# Patient Record
Sex: MALE | Race: WHITE | NOT HISPANIC OR LATINO | URBAN - NONMETROPOLITAN AREA
[De-identification: names, ages, dates, MRNs, and addresses within clinical notes are randomized per-mention and may not be internally consistent; named-entity substitution may affect disease eponyms.]

---

## 2020-01-01 ENCOUNTER — HISTORICAL (OUTPATIENT)
Dept: ADMINISTRATIVE | Facility: HOSPITAL | Age: 40
End: 2020-01-01

## 2020-01-01 ENCOUNTER — HOSPITAL ENCOUNTER (INPATIENT)
Facility: HOSPITAL | Age: 40
LOS: 4 days | DRG: 917 | End: 2020-05-09
Attending: PSYCHIATRY & NEUROLOGY | Admitting: PSYCHIATRY & NEUROLOGY
Payer: MEDICAID

## 2020-01-01 VITALS
TEMPERATURE: 99 F | WEIGHT: 197 LBS | DIASTOLIC BLOOD PRESSURE: 97 MMHG | SYSTOLIC BLOOD PRESSURE: 170 MMHG | BODY MASS INDEX: 26.68 KG/M2 | OXYGEN SATURATION: 64 % | RESPIRATION RATE: 16 BRPM | HEIGHT: 72 IN | HEART RATE: 80 BPM

## 2020-01-01 DIAGNOSIS — N17.0 ATN (ACUTE TUBULAR NECROSIS): ICD-10-CM

## 2020-01-01 DIAGNOSIS — I46.9 CARDIAC ARREST: ICD-10-CM

## 2020-01-01 DIAGNOSIS — I95.9 HYPOTENSION, UNSPECIFIED HYPOTENSION TYPE: ICD-10-CM

## 2020-01-01 DIAGNOSIS — M62.82 NON-TRAUMATIC RHABDOMYOLYSIS: ICD-10-CM

## 2020-01-01 DIAGNOSIS — T50.901A ACCIDENTAL DRUG OVERDOSE, INITIAL ENCOUNTER: ICD-10-CM

## 2020-01-01 DIAGNOSIS — I49.9 ARRHYTHMIA: ICD-10-CM

## 2020-01-01 DIAGNOSIS — T50.901A OVERDOSE: ICD-10-CM

## 2020-01-01 DIAGNOSIS — E87.5 HYPERKALEMIA: ICD-10-CM

## 2020-01-01 DIAGNOSIS — J96.01 ACUTE RESPIRATORY FAILURE WITH HYPOXIA AND HYPERCAPNIA: ICD-10-CM

## 2020-01-01 DIAGNOSIS — E87.20 LACTIC ACIDOSIS: ICD-10-CM

## 2020-01-01 DIAGNOSIS — R57.9 SHOCK: ICD-10-CM

## 2020-01-01 DIAGNOSIS — R74.01 TRANSAMINITIS: ICD-10-CM

## 2020-01-01 DIAGNOSIS — J96.02 ACUTE RESPIRATORY FAILURE WITH HYPOXIA AND HYPERCAPNIA: ICD-10-CM

## 2020-01-01 DIAGNOSIS — N17.9 AKI (ACUTE KIDNEY INJURY): ICD-10-CM

## 2020-01-01 LAB
ABO + RH BLD: NORMAL
ABO + RH BLD: NORMAL
ALBUMIN SERPL BCP-MCNC: 2.1 G/DL (ref 3.5–5.2)
ALBUMIN SERPL BCP-MCNC: 2.2 G/DL (ref 3.5–5.2)
ALBUMIN SERPL BCP-MCNC: 2.3 G/DL (ref 3.5–5.2)
ALBUMIN SERPL BCP-MCNC: 2.4 G/DL (ref 3.5–5.2)
ALBUMIN SERPL BCP-MCNC: 2.5 G/DL (ref 3.5–5.2)
ALBUMIN SERPL BCP-MCNC: 2.6 G/DL (ref 3.5–5.2)
ALBUMIN SERPL BCP-MCNC: 2.7 G/DL (ref 3.5–5.2)
ALBUMIN SERPL BCP-MCNC: 2.8 G/DL (ref 3.5–5.2)
ALBUMIN SERPL BCP-MCNC: 3 G/DL (ref 3.5–5)
ALBUMIN/GLOB SERPL ELPH: 0.9 {RATIO} (ref 1.5–2.2)
ALCOHOL (ETHANOL), BLOOD: <3 MG/DL (ref 0–3)
ALLENS TEST: ABNORMAL
ALP SERPL-CCNC: 101 U/L (ref 55–135)
ALP SERPL-CCNC: 103 U/L (ref 55–135)
ALP SERPL-CCNC: 114 U/L (ref 55–135)
ALP SERPL-CCNC: 118 U/L (ref 55–135)
ALP SERPL-CCNC: 119 U/L (ref 50–136)
ALP SERPL-CCNC: 121 U/L (ref 55–135)
ALP SERPL-CCNC: 123 U/L (ref 55–135)
ALP SERPL-CCNC: 125 U/L (ref 55–135)
ALP SERPL-CCNC: 127 U/L (ref 55–135)
ALP SERPL-CCNC: 127 U/L (ref 55–135)
ALP SERPL-CCNC: 128 U/L (ref 55–135)
ALP SERPL-CCNC: 137 U/L (ref 55–135)
ALP SERPL-CCNC: 138 U/L (ref 55–135)
ALP SERPL-CCNC: 142 U/L (ref 55–135)
ALP SERPL-CCNC: 143 U/L (ref 55–135)
ALP SERPL-CCNC: 159 U/L (ref 55–135)
ALP SERPL-CCNC: 164 U/L (ref 55–135)
ALP SERPL-CCNC: 169 U/L (ref 55–135)
ALP SERPL-CCNC: 184 U/L (ref 55–135)
ALP SERPL-CCNC: 193 U/L (ref 55–135)
ALP SERPL-CCNC: 206 U/L (ref 55–135)
ALP SERPL-CCNC: 209 U/L (ref 55–135)
ALP SERPL-CCNC: 230 U/L (ref 55–135)
ALP SERPL-CCNC: 94 U/L (ref 55–135)
ALT SERPL W P-5'-P-CCNC: 359 U/L (ref 16–61)
ALT SERPL W/O P-5'-P-CCNC: 2605 U/L (ref 10–44)
ALT SERPL W/O P-5'-P-CCNC: 3510 U/L (ref 10–44)
ALT SERPL W/O P-5'-P-CCNC: 4129 U/L (ref 10–44)
ALT SERPL W/O P-5'-P-CCNC: 4523 U/L (ref 10–44)
ALT SERPL W/O P-5'-P-CCNC: 5047 U/L (ref 10–44)
ALT SERPL W/O P-5'-P-CCNC: 5124 U/L (ref 10–44)
ALT SERPL W/O P-5'-P-CCNC: 5330 U/L (ref 10–44)
ALT SERPL W/O P-5'-P-CCNC: 5557 U/L (ref 10–44)
ALT SERPL W/O P-5'-P-CCNC: 5814 U/L (ref 10–44)
ALT SERPL W/O P-5'-P-CCNC: 5916 U/L (ref 10–44)
ALT SERPL W/O P-5'-P-CCNC: 6249 U/L (ref 10–44)
ALT SERPL W/O P-5'-P-CCNC: 7406 U/L (ref 10–44)
ALT SERPL W/O P-5'-P-CCNC: 7672 U/L (ref 10–44)
ALT SERPL W/O P-5'-P-CCNC: 8056 U/L (ref 10–44)
ALT SERPL W/O P-5'-P-CCNC: 8371 U/L (ref 10–44)
ALT SERPL W/O P-5'-P-CCNC: 8559 U/L (ref 10–44)
ALT SERPL W/O P-5'-P-CCNC: 8592 U/L (ref 10–44)
ALT SERPL W/O P-5'-P-CCNC: 9063 U/L (ref 10–44)
ALT SERPL W/O P-5'-P-CCNC: 9269 U/L (ref 10–44)
ALT SERPL W/O P-5'-P-CCNC: 9362 U/L (ref 10–44)
ALT SERPL W/O P-5'-P-CCNC: 9383 U/L (ref 10–44)
ALT SERPL W/O P-5'-P-CCNC: 9773 U/L (ref 10–44)
ALT SERPL W/O P-5'-P-CCNC: ABNORMAL U/L (ref 10–44)
AMMONIA PLAS-SCNC: 122 UMOL/L (ref 10–50)
AMMONIA PLAS-SCNC: 204 UMOL/L (ref 10–50)
ANION GAP SERPL CALC-SCNC: 11 MMOL/L (ref 8–16)
ANION GAP SERPL CALC-SCNC: 12 MMOL/L (ref 8–16)
ANION GAP SERPL CALC-SCNC: 13 MMOL/L (ref 8–16)
ANION GAP SERPL CALC-SCNC: 14 MMOL/L (ref 8–16)
ANION GAP SERPL CALC-SCNC: 15 MMOL/L (ref 8–16)
ANION GAP SERPL CALC-SCNC: 16 MMOL/L (ref 8–16)
ANION GAP SERPL CALC-SCNC: 17 MMOL/L (ref 8–16)
ANION GAP SERPL CALC-SCNC: 18 MMOL/L (ref 8–16)
ANION GAP SERPL CALC-SCNC: 20.7 MEQ/L (ref 10–20)
ANION GAP SERPL CALC-SCNC: 24 MEQ/L (ref 10–20)
ANION GAP SERPL CALC-SCNC: 24.3 MEQ/L (ref 10–20)
ANION GAP SERPL CALC-SCNC: 25.4 MEQ/L (ref 10–20)
ANISOCYTOSIS BLD QL SMEAR: SLIGHT
APAP SERPL-MCNC: <2 UG/ML (ref 10–30)
APPEARANCE, UA: NORMAL
APTT BLDCRRT: 35.3 SEC (ref 21–32)
APTT BLDCRRT: 35.5 SEC (ref 21–32)
APTT BLDCRRT: 36.4 SEC (ref 21–32)
APTT BLDCRRT: 36.4 SEC (ref 21–32)
APTT BLDCRRT: 38.4 SEC (ref 21–32)
APTT BLDCRRT: 42.6 SEC (ref 21–32)
ASCENDING AORTA: 2.69 CM
AST SERPL-CCNC: 3280 U/L (ref 10–40)
AST SERPL-CCNC: 3580 U/L (ref 10–40)
AST SERPL-CCNC: 425 U/L (ref 15–37)
AST SERPL-CCNC: 4255 U/L (ref 10–40)
AST SERPL-CCNC: 4258 U/L (ref 10–40)
AST SERPL-CCNC: 4377 U/L (ref 10–40)
AST SERPL-CCNC: 5499 U/L (ref 10–40)
AST SERPL-CCNC: 5958 U/L (ref 10–40)
AST SERPL-CCNC: 6624 U/L (ref 10–40)
AST SERPL-CCNC: 6628 U/L (ref 10–40)
AST SERPL-CCNC: 8429 U/L (ref 10–40)
AST SERPL-CCNC: 8897 U/L (ref 10–40)
AST SERPL-CCNC: 9294 U/L (ref 10–40)
AST SERPL-CCNC: 9385 U/L (ref 10–40)
AST SERPL-CCNC: ABNORMAL U/L (ref 10–40)
AV INDEX (PROSTH): 0.8
AV MEAN GRADIENT: 1 MMHG
AV PEAK GRADIENT: 1 MMHG
AV VALVE AREA: 3.03 CM2
AV VELOCITY RATIO: 0.91
BACTERIA BLD CULT: NORMAL
BACTERIA BLD CULT: NORMAL
BACTERIA SPEC AEROBE CULT: NORMAL
BACTERIA SPEC AEROBE CULT: NORMAL
BACTERIA SPEC CULT: NORMAL /HPF
BASOPHILS # BLD AUTO: 0.01 K/UL (ref 0–0.2)
BASOPHILS # BLD AUTO: 0.05 K/UL (ref 0–0.2)
BASOPHILS # BLD AUTO: 0.09 K/UL (ref 0–0.2)
BASOPHILS # BLD AUTO: 0.1 K/UL (ref 0–0.2)
BASOPHILS # BLD AUTO: ABNORMAL K/UL (ref 0–0.2)
BASOPHILS # BLD AUTO: ABNORMAL K/UL (ref 0–0.2)
BASOPHILS NFR BLD: 0 % (ref 0–1.9)
BASOPHILS NFR BLD: 0.1 10 (ref 0–0.1)
BASOPHILS NFR BLD: 0.3 % (ref 0–1.9)
BASOPHILS NFR BLD: 0.4 % (ref 0–1.9)
BASOPHILS NFR BLD: 0.4 % (ref 0–1.9)
BASOPHILS NFR BLD: 0.5 % (ref 0–1.5)
BASOPHILS NFR BLD: 0.5 % (ref 0–1.9)
BE (B): -14.3 MMOL/L
BE (B): -15.5 MMOL/L
BE (B): ABNORMAL MMOL/L
BILIRUB SERPL-MCNC: 0.62 MG/DL (ref 0.2–1)
BILIRUB SERPL-MCNC: 1.1 MG/DL (ref 0.1–1)
BILIRUB SERPL-MCNC: 1.5 MG/DL (ref 0.1–1)
BILIRUB SERPL-MCNC: 1.9 MG/DL (ref 0.1–1)
BILIRUB SERPL-MCNC: 2.7 MG/DL (ref 0.1–1)
BILIRUB SERPL-MCNC: 3.1 MG/DL (ref 0.1–1)
BILIRUB SERPL-MCNC: 3.5 MG/DL (ref 0.1–1)
BILIRUB SERPL-MCNC: 4 MG/DL (ref 0.1–1)
BILIRUB SERPL-MCNC: 4 MG/DL (ref 0.1–1)
BILIRUB SERPL-MCNC: 4.2 MG/DL (ref 0.1–1)
BILIRUB SERPL-MCNC: 4.4 MG/DL (ref 0.1–1)
BILIRUB SERPL-MCNC: 4.5 MG/DL (ref 0.1–1)
BILIRUB SERPL-MCNC: 5 MG/DL (ref 0.1–1)
BILIRUB SERPL-MCNC: 5.1 MG/DL (ref 0.1–1)
BILIRUB SERPL-MCNC: 5.2 MG/DL (ref 0.1–1)
BILIRUB SERPL-MCNC: 5.2 MG/DL (ref 0.1–1)
BILIRUB SERPL-MCNC: 5.4 MG/DL (ref 0.1–1)
BILIRUB SERPL-MCNC: 5.9 MG/DL (ref 0.1–1)
BILIRUB SERPL-MCNC: 5.9 MG/DL (ref 0.1–1)
BILIRUB SERPL-MCNC: 6 MG/DL (ref 0.1–1)
BILIRUB SERPL-MCNC: 6.3 MG/DL (ref 0.1–1)
BILIRUB SERPL-MCNC: 6.4 MG/DL (ref 0.1–1)
BILIRUB SERPL-MCNC: 6.4 MG/DL (ref 0.1–1)
BILIRUB SERPL-MCNC: 6.5 MG/DL (ref 0.1–1)
BILIRUB UR QL STRIP: NEGATIVE MG/DL
BLD GP AB SCN CELLS X3 SERPL QL: NORMAL
BLD GP AB SCN CELLS X3 SERPL QL: NORMAL
BLD PROD TYP BPU: NORMAL
BLOOD UNIT EXPIRATION DATE: NORMAL
BLOOD UNIT TYPE CODE: 5100
BLOOD UNIT TYPE CODE: 5100
BLOOD UNIT TYPE CODE: 9500
BLOOD UNIT TYPE: NORMAL
BSA FOR ECHO PROCEDURE: 2.13 M2
BUDDING YEAST: PRESENT /HPF
BUN SERPL-MCNC: 11 MG/DL (ref 6–20)
BUN SERPL-MCNC: 13 MG/DL (ref 6–20)
BUN SERPL-MCNC: 15 MG/DL (ref 6–20)
BUN SERPL-MCNC: 20 MG/DL (ref 6–20)
BUN SERPL-MCNC: 20 MG/DL (ref 7–18)
BUN SERPL-MCNC: 21 MG/DL (ref 6–20)
BUN SERPL-MCNC: 21 MG/DL (ref 6–20)
BUN SERPL-MCNC: 21 MG/DL (ref 7–18)
BUN SERPL-MCNC: 21 MG/DL (ref 7–18)
BUN SERPL-MCNC: 22 MG/DL (ref 7–18)
BUN SERPL-MCNC: 23 MG/DL (ref 6–20)
BUN SERPL-MCNC: 24 MG/DL (ref 6–20)
BUN SERPL-MCNC: 4 MG/DL (ref 6–20)
BUN SERPL-MCNC: 5 MG/DL (ref 6–20)
BUN SERPL-MCNC: 6 MG/DL (ref 6–20)
BUN SERPL-MCNC: 7 MG/DL (ref 6–20)
BUN SERPL-MCNC: 7 MG/DL (ref 6–20)
BUN SERPL-MCNC: 8 MG/DL (ref 6–20)
BUN SERPL-MCNC: 9 MG/DL (ref 6–20)
BURR CELLS BLD QL SMEAR: ABNORMAL
BURR CELLS BLD QL SMEAR: ABNORMAL
CA-I BLDV-SCNC: 0.91 MMOL/L (ref 1.06–1.42)
CA-I BLDV-SCNC: 0.94 MMOL/L (ref 1.06–1.42)
CA-I BLDV-SCNC: 1.03 MMOL/L (ref 1.06–1.42)
CA-I BLDV-SCNC: 1.06 MMOL/L (ref 1.06–1.42)
CALCIUM SERPL-MCNC: 5.4 MG/DL (ref 8.7–10.5)
CALCIUM SERPL-MCNC: 5.9 MG/DL (ref 8.5–10.1)
CALCIUM SERPL-MCNC: 6 MG/DL (ref 8.7–10.5)
CALCIUM SERPL-MCNC: 6.2 MG/DL (ref 8.7–10.5)
CALCIUM SERPL-MCNC: 6.4 MG/DL (ref 8.5–10.1)
CALCIUM SERPL-MCNC: 6.7 MG/DL (ref 8.7–10.5)
CALCIUM SERPL-MCNC: 6.7 MG/DL (ref 8.7–10.5)
CALCIUM SERPL-MCNC: 6.8 MG/DL (ref 8.7–10.5)
CALCIUM SERPL-MCNC: 6.8 MG/DL (ref 8.7–10.5)
CALCIUM SERPL-MCNC: 6.9 MG/DL (ref 8.7–10.5)
CALCIUM SERPL-MCNC: 7 MG/DL (ref 8.7–10.5)
CALCIUM SERPL-MCNC: 7.1 MG/DL (ref 8.7–10.5)
CALCIUM SERPL-MCNC: 7.3 MG/DL (ref 8.7–10.5)
CALCIUM SERPL-MCNC: 7.5 MG/DL (ref 8.7–10.5)
CALCIUM SERPL-MCNC: 7.5 MG/DL (ref 8.7–10.5)
CALCIUM SERPL-MCNC: 7.6 MG/DL (ref 8.5–10.1)
CALCIUM SERPL-MCNC: 7.6 MG/DL (ref 8.7–10.5)
CALCIUM SERPL-MCNC: 7.7 MG/DL (ref 8.7–10.5)
CALCIUM SERPL-MCNC: 7.8 MG/DL (ref 8.7–10.5)
CALCIUM SERPL-MCNC: 8 MG/DL (ref 8.7–10.5)
CALCIUM SERPL-MCNC: 8.2 MG/DL (ref 8.7–10.5)
CALCIUM SERPL-MCNC: 8.3 MG/DL (ref 8.7–10.5)
CALCIUM SERPL-MCNC: 8.4 MG/DL (ref 8.7–10.5)
CALCIUM SERPL-MCNC: 8.5 MG/DL (ref 8.7–10.5)
CALCIUM SERPL-MCNC: 8.6 MG/DL (ref 8.7–10.5)
CALCIUM SERPL-MCNC: 8.7 MG/DL (ref 8.5–10.1)
CASTS, URINE MICROSCOPIC: 11 /LPF
CHLORIDE SERPL-SCNC: 100 MMOL/L (ref 95–110)
CHLORIDE SERPL-SCNC: 101 MMOL/L (ref 95–110)
CHLORIDE SERPL-SCNC: 102 MMOL/L (ref 95–110)
CHLORIDE SERPL-SCNC: 103 MMOL/L (ref 98–107)
CHLORIDE SERPL-SCNC: 104 MMOL/L (ref 95–110)
CHLORIDE SERPL-SCNC: 104 MMOL/L (ref 95–110)
CHLORIDE SERPL-SCNC: 105 MMOL/L (ref 95–110)
CHLORIDE SERPL-SCNC: 106 MMOL/L (ref 95–110)
CHLORIDE SERPL-SCNC: 107 MMOL/L (ref 98–107)
CHLORIDE SERPL-SCNC: 108 MMOL/L (ref 95–110)
CHLORIDE SERPL-SCNC: 110 MMOL/L (ref 98–107)
CHLORIDE SERPL-SCNC: 110 MMOL/L (ref 98–107)
CHLORIDE SERPL-SCNC: 98 MMOL/L (ref 95–110)
CHLORIDE SERPL-SCNC: 99 MMOL/L (ref 95–110)
CK SERPL-CCNC: ABNORMAL U/L (ref 20–200)
CO2 SERPL-SCNC: 15 MMOL/L (ref 23–29)
CO2 SERPL-SCNC: 16 MMOL/L (ref 22–32)
CO2 SERPL-SCNC: 16 MMOL/L (ref 22–32)
CO2 SERPL-SCNC: 16 MMOL/L (ref 23–29)
CO2 SERPL-SCNC: 17 MMOL/L (ref 23–29)
CO2 SERPL-SCNC: 18 MMOL/L (ref 22–32)
CO2 SERPL-SCNC: 18 MMOL/L (ref 22–32)
CO2 SERPL-SCNC: 18 MMOL/L (ref 23–29)
CO2 SERPL-SCNC: 19 MMOL/L (ref 23–29)
CO2 SERPL-SCNC: 20 MMOL/L (ref 23–29)
CO2 SERPL-SCNC: 21 MMOL/L (ref 23–29)
CO2 SERPL-SCNC: 22 MMOL/L (ref 23–29)
CO2 SERPL-SCNC: 23 MMOL/L (ref 23–29)
CO2 SERPL-SCNC: 23 MMOL/L (ref 23–29)
CO2 SERPL-SCNC: 24 MMOL/L (ref 23–29)
CO2 SERPL-SCNC: 27 MMOL/L (ref 23–29)
CO2 SERPL-SCNC: 29 MMOL/L (ref 23–29)
CO2 SERPL-SCNC: 30 MMOL/L (ref 23–29)
CO2 SERPL-SCNC: 31 MMOL/L (ref 23–29)
CODING SYSTEM: NORMAL
COLOR UR: YELLOW
CREAT SERPL-MCNC: 0.8 MG/DL (ref 0.5–1.4)
CREAT SERPL-MCNC: 0.9 MG/DL (ref 0.5–1.4)
CREAT SERPL-MCNC: 1 MG/DL (ref 0.5–1.4)
CREAT SERPL-MCNC: 1 MG/DL (ref 0.5–1.4)
CREAT SERPL-MCNC: 1.2 MG/DL (ref 0.5–1.4)
CREAT SERPL-MCNC: 1.2 MG/DL (ref 0.5–1.4)
CREAT SERPL-MCNC: 1.4 MG/DL (ref 0.5–1.4)
CREAT SERPL-MCNC: 1.5 MG/DL (ref 0.5–1.4)
CREAT SERPL-MCNC: 1.7 MG/DL (ref 0.5–1.4)
CREAT SERPL-MCNC: 1.72 MG/DL (ref 0.7–1.3)
CREAT SERPL-MCNC: 1.81 MG/DL (ref 0.7–1.3)
CREAT SERPL-MCNC: 1.82 MG/DL (ref 0.7–1.3)
CREAT SERPL-MCNC: 1.9 MG/DL (ref 0.5–1.4)
CREAT SERPL-MCNC: 1.99 MG/DL (ref 0.7–1.3)
CREAT SERPL-MCNC: 2 MG/DL (ref 0.5–1.4)
CREAT SERPL-MCNC: 2.1 MG/DL (ref 0.5–1.4)
CREAT SERPL-MCNC: 2.1 MG/DL (ref 0.5–1.4)
CREAT SERPL-MCNC: 2.2 MG/DL (ref 0.5–1.4)
CTCO2: 17.6 MMOL/L (ref 22–30)
CTCO2: 18.7 MMOL/L (ref 22–30)
CTCO2: ABNORMAL MMOL/L (ref 22–30)
CV ECHO LV RWT: 0.35 CM
DACRYOCYTES BLD QL SMEAR: ABNORMAL
DELSYS: ABNORMAL
DIFFERENTIAL METHOD: ABNORMAL
DISPENSE STATUS: NORMAL
DOHLE BOD BLD QL SMEAR: PRESENT
DOP CALC AO PEAK VEL: 0.53 M/S
DOP CALC AO VTI: 8.84 CM
DOP CALC LVOT AREA: 3.8 CM2
DOP CALC LVOT DIAMETER: 2.2 CM
DOP CALC LVOT PEAK VEL: 0.48 M/S
DOP CALC LVOT STROKE VOLUME: 26.75 CM3
DOP CALCLVOT PEAK VEL VTI: 7.04 CM
E WAVE DECELERATION TIME: 228.27 MSEC
E/A RATIO: 1.52
E/E' RATIO: 6.71 M/S
ECHO LV POSTERIOR WALL: 0.74 CM (ref 0.6–1.1)
EGFR: 40 ML/MIN/1.73M
EGFR: 44 ML/MIN/1.73M
EGFR: 44 ML/MIN/1.73M
EGFR: 47 ML/MIN/1.73M
EOSINOPHIL # BLD AUTO: 0 K/UL (ref 0–0.5)
EOSINOPHIL # BLD AUTO: ABNORMAL K/UL (ref 0–0.5)
EOSINOPHIL # BLD AUTO: ABNORMAL K/UL (ref 0–0.5)
EOSINOPHIL NFR BLD: 0 % (ref 0–8)
EOSINOPHIL NFR BLD: 0.1 % (ref 0–8)
EOSINOPHIL NFR BLD: 0.1 10 (ref 0–0.7)
EOSINOPHIL NFR BLD: 0.2 % (ref 0–8)
EOSINOPHIL NFR BLD: 0.5 % (ref 0–7)
EPITHELIAL, URINE MICROSCOPIC: 16 /HPF
ERYTHROCYTE [DISTWIDTH] IN BLOOD BY AUTOMATED COUNT: 13.2 % (ref 11.5–14.5)
ERYTHROCYTE [DISTWIDTH] IN BLOOD BY AUTOMATED COUNT: 13.6 % (ref 11.5–14.5)
ERYTHROCYTE [DISTWIDTH] IN BLOOD BY AUTOMATED COUNT: 13.6 % (ref 11.5–14.5)
ERYTHROCYTE [DISTWIDTH] IN BLOOD BY AUTOMATED COUNT: 14.1 % (ref 11.5–14.5)
ERYTHROCYTE [DISTWIDTH] IN BLOOD BY AUTOMATED COUNT: 14.4 % (ref 11.5–14.5)
ERYTHROCYTE [DISTWIDTH] IN BLOOD BY AUTOMATED COUNT: 14.5 % (ref 11.5–14.5)
ERYTHROCYTE [SEDIMENTATION RATE] IN BLOOD BY WESTERGREN METHOD: 18 MM/H
ERYTHROCYTE [SEDIMENTATION RATE] IN BLOOD BY WESTERGREN METHOD: 28 MM/H
ERYTHROCYTE [SEDIMENTATION RATE] IN BLOOD BY WESTERGREN METHOD: 32 MM/H
EST. GFR  (AFRICAN AMERICAN): 41.8 ML/MIN/1.73 M^2
EST. GFR  (AFRICAN AMERICAN): 44.2 ML/MIN/1.73 M^2
EST. GFR  (AFRICAN AMERICAN): 44.2 ML/MIN/1.73 M^2
EST. GFR  (AFRICAN AMERICAN): 46.9 ML/MIN/1.73 M^2
EST. GFR  (AFRICAN AMERICAN): 49.9 ML/MIN/1.73 M^2
EST. GFR  (AFRICAN AMERICAN): 57 ML/MIN/1.73 M^2
EST. GFR  (AFRICAN AMERICAN): >60 ML/MIN/1.73 M^2
EST. GFR  (NON AFRICAN AMERICAN): 36.1 ML/MIN/1.73 M^2
EST. GFR  (NON AFRICAN AMERICAN): 38.2 ML/MIN/1.73 M^2
EST. GFR  (NON AFRICAN AMERICAN): 38.2 ML/MIN/1.73 M^2
EST. GFR  (NON AFRICAN AMERICAN): 40.5 ML/MIN/1.73 M^2
EST. GFR  (NON AFRICAN AMERICAN): 43.1 ML/MIN/1.73 M^2
EST. GFR  (NON AFRICAN AMERICAN): 49.3 ML/MIN/1.73 M^2
EST. GFR  (NON AFRICAN AMERICAN): 57.4 ML/MIN/1.73 M^2
EST. GFR  (NON AFRICAN AMERICAN): >60 ML/MIN/1.73 M^2
ESTIMATED AVG GLUCOSE: 114 MG/DL (ref 68–131)
ETHANOL SERPL-MCNC: <10 MG/DL
FIBRINOGEN PPP-MCNC: 197 MG/DL (ref 182–366)
FIBRINOGEN PPP-MCNC: 78 MG/DL (ref 182–366)
FIO2: 100
FIO2: 50
FIO2: 60
FIO2: 90
FRACTIONAL SHORTENING: 21 % (ref 28–44)
GIANT PLATELETS BLD QL SMEAR: PRESENT
GIANT PLATELETS BLD QL SMEAR: PRESENT
GLOBULIN: 3.2 G/DL (ref 2.3–3.5)
GLUCOSE (UA): 100 MG/DL
GLUCOSE SERPL-MCNC: 100 MG/DL (ref 70–110)
GLUCOSE SERPL-MCNC: 100 MG/DL (ref 70–110)
GLUCOSE SERPL-MCNC: 107 MG/DL (ref 70–99)
GLUCOSE SERPL-MCNC: 112 MG/DL (ref 70–110)
GLUCOSE SERPL-MCNC: 134 MG/DL (ref 70–110)
GLUCOSE SERPL-MCNC: 155 MG/DL (ref 70–110)
GLUCOSE SERPL-MCNC: 34 MG/DL (ref 70–110)
GLUCOSE SERPL-MCNC: 60 MG/DL (ref 70–110)
GLUCOSE SERPL-MCNC: 61 MG/DL (ref 70–110)
GLUCOSE SERPL-MCNC: 64 MG/DL (ref 70–110)
GLUCOSE SERPL-MCNC: 65 MG/DL (ref 70–99)
GLUCOSE SERPL-MCNC: 66 MG/DL (ref 70–110)
GLUCOSE SERPL-MCNC: 68 MG/DL (ref 70–110)
GLUCOSE SERPL-MCNC: 69 MG/DL (ref 70–99)
GLUCOSE SERPL-MCNC: 73 MG/DL (ref 70–110)
GLUCOSE SERPL-MCNC: 74 MG/DL (ref 70–110)
GLUCOSE SERPL-MCNC: 74 MG/DL (ref 70–110)
GLUCOSE SERPL-MCNC: 77 MG/DL (ref 70–110)
GLUCOSE SERPL-MCNC: 77 MG/DL (ref 70–110)
GLUCOSE SERPL-MCNC: 81 MG/DL (ref 70–110)
GLUCOSE SERPL-MCNC: 82 MG/DL (ref 70–110)
GLUCOSE SERPL-MCNC: 83 MG/DL (ref 70–110)
GLUCOSE SERPL-MCNC: 84 MG/DL (ref 70–110)
GLUCOSE SERPL-MCNC: 86 MG/DL (ref 70–110)
GLUCOSE SERPL-MCNC: 90 MG/DL (ref 70–110)
GLUCOSE SERPL-MCNC: 92 MG/DL (ref 70–110)
GLUCOSE SERPL-MCNC: 93 MG/DL (ref 70–110)
GLUCOSE SERPL-MCNC: 94 MG/DL (ref 70–99)
GLUCOSE SERPL-MCNC: 95 MG/DL (ref 70–110)
GLUCOSE SERPL-MCNC: 96 MG/DL (ref 70–110)
GLUCOSE SERPL-MCNC: 99 MG/DL (ref 70–110)
GRAM STN SPEC: NORMAL
GRAN #: 4.39 10 (ref 2–7.5)
GRAN%: 1.9 %
GRAN%: 41.4 % (ref 50–80)
HBA1C MFR BLD HPLC: 5.6 % (ref 4–5.6)
HCO3 UR-SCNC: 17.4 MMOL/L (ref 24–28)
HCO3 UR-SCNC: 19.2 MMOL/L (ref 24–28)
HCO3 UR-SCNC: 19.3 MMOL/L (ref 24–28)
HCO3 UR-SCNC: 19.6 MMOL/L (ref 24–28)
HCO3 UR-SCNC: 20.3 MMOL/L (ref 24–28)
HCO3 UR-SCNC: 21.5 MMOL/L (ref 24–28)
HCO3 UR-SCNC: 23.6 MMOL/L (ref 24–28)
HCO3 UR-SCNC: 23.9 MMOL/L (ref 24–28)
HCO3 UR-SCNC: 24.6 MMOL/L (ref 24–28)
HCO3 UR-SCNC: 25.6 MMOL/L (ref 24–28)
HCO3 UR-SCNC: 26.5 MMOL/L (ref 24–28)
HCO3 UR-SCNC: 26.7 MMOL/L (ref 24–28)
HCO3 UR-SCNC: 30.5 MMOL/L (ref 24–28)
HCO3 UR-SCNC: 32.4 MMOL/L (ref 24–28)
HCO3 UR-SCNC: 37.5 MMOL/L (ref 24–28)
HCO3 UR-SCNC: 38.6 MMOL/L (ref 24–28)
HCO3-: 15.6 MMOL/L (ref 22–26)
HCO3-: 16.8 MMOL/L (ref 22–26)
HCO3-: ABNORMAL MMOL/L (ref 22–26)
HCT VFR BLD AUTO: 32.5 % (ref 40–54)
HCT VFR BLD AUTO: 34.7 % (ref 40–54)
HCT VFR BLD AUTO: 36.5 % (ref 40–54)
HCT VFR BLD AUTO: 39.7 % (ref 40–54)
HCT VFR BLD AUTO: 39.8 % (ref 40–54)
HCT VFR BLD AUTO: 40.8 % (ref 40–54)
HCT VFR BLD AUTO: 42.1 % (ref 43.5–53.7)
HCT VFR BLD AUTO: 46.7 % (ref 40–54)
HGB BLD-MCNC: 10.8 G/DL (ref 14–18)
HGB BLD-MCNC: 11.1 G/DL (ref 14–18)
HGB BLD-MCNC: 11.3 G/DL (ref 14–18)
HGB BLD-MCNC: 12.1 G/DL (ref 14.1–18.1)
HGB BLD-MCNC: 12.3 G/DL (ref 14–18)
HGB BLD-MCNC: 12.4 G/DL (ref 14–18)
HGB BLD-MCNC: 12.8 G/DL (ref 14–18)
HGB BLD-MCNC: 14 G/DL (ref 14–18)
HGB UR QL STRIP: NORMAL ERY/UL
HYPOCHROMIA BLD QL SMEAR: ABNORMAL
IMM GRANULOCYTES # BLD AUTO: 0.01 K/UL (ref 0–0.04)
IMM GRANULOCYTES # BLD AUTO: 0.07 K/UL (ref 0–0.04)
IMM GRANULOCYTES # BLD AUTO: 0.22 K/UL (ref 0–0.04)
IMM GRANULOCYTES # BLD AUTO: 1.06 K/UL (ref 0–0.04)
IMM GRANULOCYTES # BLD AUTO: ABNORMAL K/UL (ref 0–0.04)
IMM GRANULOCYTES NFR BLD AUTO: 0.3 % (ref 0–0.5)
IMM GRANULOCYTES NFR BLD AUTO: 0.7 % (ref 0–0.5)
IMM GRANULOCYTES NFR BLD AUTO: 0.9 % (ref 0–0.5)
IMM GRANULOCYTES NFR BLD AUTO: 4.2 % (ref 0–0.5)
IMM GRANULOCYTES NFR BLD AUTO: ABNORMAL % (ref 0–0.5)
IMMATURE GRANULOCYTES #: 0.2 10
INR PPP: 1.8 (ref 0.8–1.2)
INR PPP: 2.1 (ref 0.8–1.2)
INR PPP: 2.4 (ref 0.8–1.2)
INR PPP: 2.9 (ref 0.8–1.2)
INR PPP: 2.9 (ref 0.8–1.2)
INR PPP: 3 (ref 0.8–1.2)
INR PPP: 3.4 (ref 0.8–1.2)
INTERVENTRICULAR SEPTUM: 0.49 CM (ref 0.6–1.1)
IVRT: 98.95 MSEC
KETONES UR QL STRIP: NEGATIVE MG/DL
LA MAJOR: 5.08 CM
LA MINOR: 5.03 CM
LA WIDTH: 3.73 CM
LACTATE SERPL-SCNC: 10.1 MMOL/L (ref 0.5–2.2)
LACTATE SERPL-SCNC: 11.31 MMOL/L (ref 0.4–2)
LACTATE SERPL-SCNC: 16.92 MMOL/L (ref 0.4–2)
LACTATE SERPL-SCNC: 6.9 MMOL/L (ref 0.5–2.2)
LACTATE SERPL-SCNC: 7.2 MMOL/L (ref 0.5–2.2)
LEFT ATRIUM SIZE: 2.61 CM
LEFT ATRIUM VOLUME INDEX: 19.8 ML/M2
LEFT ATRIUM VOLUME: 41.83 CM3
LEFT INTERNAL DIMENSION IN SYSTOLE: 3.38 CM (ref 2.1–4)
LEFT VENTRICLE DIASTOLIC VOLUME INDEX: 38.72 ML/M2
LEFT VENTRICLE DIASTOLIC VOLUME: 81.97 ML
LEFT VENTRICLE MASS INDEX: 35 G/M2
LEFT VENTRICLE SYSTOLIC VOLUME INDEX: 22.1 ML/M2
LEFT VENTRICLE SYSTOLIC VOLUME: 46.85 ML
LEFT VENTRICULAR INTERNAL DIMENSION IN DIASTOLE: 4.28 CM (ref 3.5–6)
LEFT VENTRICULAR MASS: 74.55 G
LEUKOCYTE ESTERASE UR QL STRIP: NEGATIVE LEU/UL
LV LATERAL E/E' RATIO: 5.88 M/S
LV SEPTAL E/E' RATIO: 7.83 M/S
LYMPH #: 4.7 10 (ref 1–3.5)
LYMPH%: 44.3 % (ref 12–50)
LYMPHOCYTES # BLD AUTO: 1.2 K/UL (ref 1–4.8)
LYMPHOCYTES # BLD AUTO: 1.5 K/UL (ref 1–4.8)
LYMPHOCYTES # BLD AUTO: 1.8 K/UL (ref 1–4.8)
LYMPHOCYTES # BLD AUTO: 1.9 K/UL (ref 1–4.8)
LYMPHOCYTES # BLD AUTO: ABNORMAL K/UL (ref 1–4.8)
LYMPHOCYTES # BLD AUTO: ABNORMAL K/UL (ref 1–4.8)
LYMPHOCYTES NFR BLD: 12 % (ref 18–48)
LYMPHOCYTES NFR BLD: 12.6 % (ref 18–48)
LYMPHOCYTES NFR BLD: 45.3 % (ref 18–48)
LYMPHOCYTES NFR BLD: 5.8 % (ref 18–48)
LYMPHOCYTES NFR BLD: 7.4 % (ref 18–48)
LYMPHOCYTES NFR BLD: 8 % (ref 18–48)
LYMPHOCYTES NFR BLD: 9 % (ref 18–48)
MAGNESIUM SERPL-MCNC: 1.7 MG/DL (ref 1.6–2.6)
MAGNESIUM SERPL-MCNC: 1.8 MG/DL (ref 1.6–2.6)
MAGNESIUM SERPL-MCNC: 1.9 MG/DL (ref 1.6–2.6)
MAGNESIUM SERPL-MCNC: 2 MG/DL (ref 1.6–2.6)
MAGNESIUM SERPL-MCNC: 2.1 MG/DL (ref 1.6–2.6)
MAGNESIUM SERPL-MCNC: 2.3 MG/DL (ref 1.6–2.6)
MAGNESIUM SERPL-MCNC: 2.4 MG/DL (ref 1.6–2.6)
MCH RBC QN AUTO: 30.2 PG (ref 27–31)
MCH RBC QN AUTO: 30.4 PG (ref 27–31)
MCH RBC QN AUTO: 30.5 PG (ref 27–31)
MCH RBC QN AUTO: 30.6 PG (ref 27–31)
MCH RBC QN AUTO: 30.6 PG (ref 27–31)
MCH RBC QN AUTO: 30.9 PG (ref 27–31)
MCH RBC QN AUTO: 30.9 PG (ref 27–31)
MCH RBC QN AUTO: 31 PG (ref 27–31)
MCHC RBC AUTO-ENTMCNC: 28.7 G% (ref 32–35)
MCHC RBC AUTO-ENTMCNC: 30 G/DL (ref 32–36)
MCHC RBC AUTO-ENTMCNC: 31 G/DL (ref 32–36)
MCHC RBC AUTO-ENTMCNC: 31 G/DL (ref 32–36)
MCHC RBC AUTO-ENTMCNC: 31.2 G/DL (ref 32–36)
MCHC RBC AUTO-ENTMCNC: 31.4 G/DL (ref 32–36)
MCHC RBC AUTO-ENTMCNC: 32 G/DL (ref 32–36)
MCHC RBC AUTO-ENTMCNC: 33.2 G/DL (ref 32–36)
MCV RBC AUTO: 100 FL (ref 82–98)
MCV RBC AUTO: 101 FL (ref 82–98)
MCV RBC AUTO: 106.6 FL (ref 80–97)
MCV RBC AUTO: 93 FL (ref 82–98)
MCV RBC AUTO: 97 FL (ref 82–98)
MCV RBC AUTO: 97 FL (ref 82–98)
MCV RBC AUTO: 98 FL (ref 82–98)
MCV RBC AUTO: 99 FL (ref 82–98)
METAMYELOCYTES NFR BLD MANUAL: 1 %
METAMYELOCYTES NFR BLD MANUAL: 2 %
MIN VOL: 14.4
MIN VOL: 14.4
MIN VOL: 14.8
MIN VOL: 15.4
MIN VOL: 17
MIN VOL: 17.4
MODALLEN: ABNORMAL
MODE: ABNORMAL
MODE: AC
MONO #: 1.2 10 (ref 0–0.8)
MONO%: 11.4 % (ref 0–12)
MONOCYTES # BLD AUTO: 0 K/UL (ref 0.3–1)
MONOCYTES # BLD AUTO: 0.2 K/UL (ref 0.3–1)
MONOCYTES # BLD AUTO: 0.2 K/UL (ref 0.3–1)
MONOCYTES # BLD AUTO: 0.5 K/UL (ref 0.3–1)
MONOCYTES # BLD AUTO: ABNORMAL K/UL (ref 0.3–1)
MONOCYTES # BLD AUTO: ABNORMAL K/UL (ref 0.3–1)
MONOCYTES NFR BLD: 0.1 % (ref 4–15)
MONOCYTES NFR BLD: 1.8 % (ref 4–15)
MONOCYTES NFR BLD: 2 % (ref 4–15)
MONOCYTES NFR BLD: 2 % (ref 4–15)
MONOCYTES NFR BLD: 2.6 % (ref 4–15)
MONOCYTES NFR BLD: 3 % (ref 4–15)
MONOCYTES NFR BLD: 3.8 % (ref 4–15)
MV PEAK A VEL: 0.31 M/S
MV PEAK E VEL: 0.47 M/S
NEUTROPHILS # BLD AUTO: 2 K/UL (ref 1.8–7.7)
NEUTROPHILS # BLD AUTO: 21.8 K/UL (ref 1.8–7.7)
NEUTROPHILS # BLD AUTO: 23.7 K/UL (ref 1.8–7.7)
NEUTROPHILS # BLD AUTO: 7.8 K/UL (ref 1.8–7.7)
NEUTROPHILS NFR BLD: 50.3 % (ref 38–73)
NEUTROPHILS NFR BLD: 69 % (ref 38–73)
NEUTROPHILS NFR BLD: 72 % (ref 38–73)
NEUTROPHILS NFR BLD: 83.4 % (ref 38–73)
NEUTROPHILS NFR BLD: 86 % (ref 38–73)
NEUTROPHILS NFR BLD: 86.1 % (ref 38–73)
NEUTROPHILS NFR BLD: 92.8 % (ref 38–73)
NEUTS BAND NFR BLD MANUAL: 16 %
NEUTS BAND NFR BLD MANUAL: 16 %
NEUTS BAND NFR BLD MANUAL: 2 %
NITRITE UR QL STRIP: NEGATIVE MG/DL
NRBC BLD-RTO: 0 /100 WBC
NRBC BLD-RTO: 1 /100 WBC
NRBC BLD-RTO: 1 /100 WBC
NUM UNITS TRANS FFP: NORMAL
NUM UNITS TRANS FFP: NORMAL
O2SAT: 71.1 % (ref 92–100)
O2SAT: 89.4 % (ref 92–100)
O2SAT: 95.2 % (ref 92–100)
OB PNL STL: POSITIVE
OSMOC: 279 MOSM/KG (ref 275–295)
OSMOC: 286 MOSM/KG (ref 275–295)
OSMOC: 286 MOSM/KG (ref 275–295)
OSMOC: 287 MOSM/KG (ref 275–295)
OVALOCYTES BLD QL SMEAR: ABNORMAL
OVALOCYTES BLD QL SMEAR: ABNORMAL
PCO2 BLDA: 34.7 MMHG (ref 35–45)
PCO2 BLDA: 36.9 MMHG (ref 35–45)
PCO2 BLDA: 38.5 MMHG (ref 35–45)
PCO2 BLDA: 39.4 MMHG (ref 35–45)
PCO2 BLDA: 42.2 MMHG (ref 35–45)
PCO2 BLDA: 50.1 MMHG (ref 35–45)
PCO2 BLDA: 51.1 MMHG (ref 35–45)
PCO2 BLDA: 51.8 MMHG (ref 35–45)
PCO2 BLDA: 52.5 MMHG (ref 35–45)
PCO2 BLDA: 53 MMHG (ref 35–45)
PCO2 BLDA: 57.5 MMHG (ref 35–45)
PCO2 BLDA: 57.8 MMHG (ref 35–45)
PCO2 BLDA: 58.9 MMHG (ref 35–45)
PCO2 BLDA: 59.7 MMHG (ref 35–45)
PCO2 BLDA: 68.9 MMHG (ref 35–45)
PCO2 BLDA: 73.3 MMHG (ref 35–45)
PCO2: 122.8 MMHG (ref 35–45)
PCO2: 63.2 MMHG (ref 35–45)
PCO2: 64.9 MMHG (ref 35–45)
PEEP: 10
PEEP: 12
PEEP: 5
PEEP: 530
PH SMN: 7.06 [PH] (ref 7.35–7.45)
PH SMN: 7.09 [PH] (ref 7.35–7.45)
PH SMN: 7.17 [PH] (ref 7.35–7.45)
PH SMN: 7.17 [PH] (ref 7.35–7.45)
PH SMN: 7.19 [PH] (ref 7.35–7.45)
PH SMN: 7.2 [PH] (ref 7.35–7.45)
PH SMN: 7.24 [PH] (ref 7.35–7.45)
PH SMN: 7.25 [PH] (ref 7.35–7.45)
PH SMN: 7.28 [PH] (ref 7.35–7.45)
PH SMN: 7.32 [PH] (ref 7.35–7.45)
PH SMN: 7.33 [PH] (ref 7.35–7.45)
PH SMN: 7.38 [PH] (ref 7.35–7.45)
PH SMN: 7.45 [PH] (ref 7.35–7.45)
PH SMN: 7.53 [PH] (ref 7.35–7.45)
PH SMN: 7.55 [PH] (ref 7.35–7.45)
PH SMN: 7.56 [PH] (ref 7.35–7.45)
PH UR STRIP: 5 [PH] (ref 5–7.5)
PH: 7 (ref 7.35–7.45)
PH: 7.04 (ref 7.35–7.45)
PH: <6.696 (ref 7.35–7.45)
PHOSPHATE SERPL-MCNC: 1.7 MG/DL (ref 2.7–4.5)
PHOSPHATE SERPL-MCNC: 1.8 MG/DL (ref 2.7–4.5)
PHOSPHATE SERPL-MCNC: 1.9 MG/DL (ref 2.7–4.5)
PHOSPHATE SERPL-MCNC: 2.2 MG/DL (ref 2.7–4.5)
PHOSPHATE SERPL-MCNC: 2.3 MG/DL (ref 2.7–4.5)
PHOSPHATE SERPL-MCNC: 2.8 MG/DL (ref 2.7–4.5)
PHOSPHATE SERPL-MCNC: 2.9 MG/DL (ref 2.7–4.5)
PHOSPHATE SERPL-MCNC: 3.1 MG/DL (ref 2.7–4.5)
PHOSPHATE SERPL-MCNC: 3.4 MG/DL (ref 2.7–4.5)
PHOSPHATE SERPL-MCNC: 3.5 MG/DL (ref 2.7–4.5)
PHOSPHATE SERPL-MCNC: 3.5 MG/DL (ref 2.7–4.5)
PHOSPHATE SERPL-MCNC: 3.6 MG/DL (ref 2.7–4.5)
PHOSPHATE SERPL-MCNC: 3.7 MG/DL (ref 2.7–4.5)
PHOSPHATE SERPL-MCNC: 3.9 MG/DL (ref 2.7–4.5)
PHOSPHATE SERPL-MCNC: 3.9 MG/DL (ref 2.7–4.5)
PHOSPHATE SERPL-MCNC: 4.2 MG/DL (ref 2.7–4.5)
PHOSPHATE SERPL-MCNC: 4.3 MG/DL (ref 2.7–4.5)
PHOSPHATE SERPL-MCNC: 4.4 MG/DL (ref 2.7–4.5)
PHOSPHATE SERPL-MCNC: 5.4 MG/DL (ref 2.7–4.5)
PHOSPHATE SERPL-MCNC: 5.4 MG/DL (ref 2.7–4.5)
PHOSPHATE SERPL-MCNC: 5.5 MG/DL (ref 2.7–4.5)
PHOSPHATE SERPL-MCNC: 6.2 MG/DL (ref 2.7–4.5)
PHOSPHATE SERPL-MCNC: 7.3 MG/DL (ref 2.7–4.5)
PHOSPHATE SERPL-MCNC: 9.3 MG/DL (ref 2.7–4.5)
PIP: 24
PISA TR MAX VEL: 2.07 M/S
PLATELET # BLD AUTO: 132 K/UL (ref 150–350)
PLATELET # BLD AUTO: 133 K/UL (ref 150–350)
PLATELET # BLD AUTO: 240 K/UL (ref 150–350)
PLATELET # BLD AUTO: 275 K/UL (ref 150–350)
PLATELET # BLD AUTO: 57 K/UL (ref 150–350)
PLATELET # BLD AUTO: 77 K/UL (ref 150–350)
PLATELET # BLD AUTO: 98 K/UL (ref 150–350)
PLATELET BLD QL SMEAR: ABNORMAL
PMV BLD AUTO: 11.7 FL (ref 9.2–12.9)
PMV BLD AUTO: 12 FL (ref 9.2–12.9)
PMV BLD AUTO: 12.2 FL (ref 9.2–12.9)
PMV BLD AUTO: 12.3 FL (ref 7.4–10.4)
PMV BLD AUTO: 12.3 FL (ref 9.2–12.9)
PMV BLD AUTO: 12.5 FL (ref 9.2–12.9)
PMV BLD AUTO: 12.6 FL (ref 9.2–12.9)
PMV BLD AUTO: 12.6 FL (ref 9.2–12.9)
PMV BLD AUTO: 294 10 (ref 142–424)
PO2 BLDA: 104 MMHG (ref 80–100)
PO2 BLDA: 118 MMHG (ref 80–100)
PO2 BLDA: 123 MMHG (ref 80–100)
PO2 BLDA: 128 MMHG (ref 80–100)
PO2 BLDA: 141 MMHG (ref 80–100)
PO2 BLDA: 150 MMHG (ref 80–100)
PO2 BLDA: 156 MMHG (ref 80–100)
PO2 BLDA: 172 MMHG (ref 80–100)
PO2 BLDA: 283 MMHG (ref 80–100)
PO2 BLDA: 36 MMHG (ref 40–60)
PO2 BLDA: 420 MMHG (ref 80–100)
PO2 BLDA: 446 MMHG (ref 80–100)
PO2 BLDA: 74 MMHG (ref 80–100)
PO2 BLDA: 77 MMHG (ref 80–100)
PO2 BLDA: 85 MMHG (ref 80–100)
PO2 BLDA: 94 MMHG (ref 80–100)
PO2: 114.8 (ref 80–100)
PO2: 45.6 (ref 80–100)
PO2: 82 (ref 80–100)
POC BE: -1 MMOL/L
POC BE: -1 MMOL/L
POC BE: -11 MMOL/L
POC BE: -12 MMOL/L
POC BE: -3 MMOL/L
POC BE: -3 MMOL/L
POC BE: -7 MMOL/L
POC BE: -8 MMOL/L
POC BE: -9 MMOL/L
POC BE: -9 MMOL/L
POC BE: 1 MMOL/L
POC BE: 10 MMOL/L
POC BE: 13 MMOL/L
POC BE: 15 MMOL/L
POC BE: 2 MMOL/L
POC BE: 8 MMOL/L
POC SATURATED O2: 100 % (ref 95–100)
POC SATURATED O2: 46 % (ref 95–100)
POC SATURATED O2: 91 % (ref 95–100)
POC SATURATED O2: 94 % (ref 95–100)
POC SATURATED O2: 95 % (ref 95–100)
POC SATURATED O2: 96 % (ref 95–100)
POC SATURATED O2: 97 % (ref 95–100)
POC SATURATED O2: 98 % (ref 95–100)
POC SATURATED O2: 98 % (ref 95–100)
POC SATURATED O2: 99 % (ref 95–100)
POC TCO2: 19 MMOL/L (ref 23–27)
POC TCO2: 21 MMOL/L (ref 23–27)
POC TCO2: 21 MMOL/L (ref 23–27)
POC TCO2: 22 MMOL/L (ref 23–27)
POC TCO2: 22 MMOL/L (ref 24–29)
POC TCO2: 23 MMOL/L (ref 23–27)
POC TCO2: 25 MMOL/L (ref 23–27)
POC TCO2: 25 MMOL/L (ref 23–27)
POC TCO2: 26 MMOL/L (ref 23–27)
POC TCO2: 27 MMOL/L (ref 23–27)
POC TCO2: 28 MMOL/L (ref 23–27)
POC TCO2: 28 MMOL/L (ref 23–27)
POC TCO2: 32 MMOL/L (ref 23–27)
POC TCO2: 34 MMOL/L (ref 23–27)
POC TCO2: 39 MMOL/L (ref 23–27)
POC TCO2: 41 MMOL/L (ref 23–27)
POCT GLUCOSE: 111 MG/DL (ref 70–110)
POCT GLUCOSE: 112 MG/DL (ref 70–110)
POCT GLUCOSE: 114 MG/DL (ref 70–110)
POCT GLUCOSE: 116 MG/DL (ref 70–110)
POCT GLUCOSE: 122 MG/DL (ref 70–110)
POCT GLUCOSE: 124 MG/DL (ref 70–110)
POCT GLUCOSE: 136 MG/DL (ref 70–110)
POCT GLUCOSE: 157 MG/DL (ref 70–110)
POCT GLUCOSE: 161 MG/DL (ref 70–110)
POCT GLUCOSE: 179 MG/DL (ref 70–110)
POCT GLUCOSE: 189 MG/DL (ref 70–110)
POCT GLUCOSE: 229 MG/DL (ref 70–110)
POCT GLUCOSE: 46 MG/DL (ref 70–110)
POCT GLUCOSE: 47 MG/DL (ref 70–110)
POCT GLUCOSE: 55 MG/DL (ref 70–110)
POCT GLUCOSE: 57 MG/DL (ref 70–110)
POCT GLUCOSE: 60 MG/DL (ref 70–110)
POCT GLUCOSE: 63 MG/DL (ref 70–110)
POCT GLUCOSE: 65 MG/DL (ref 70–110)
POCT GLUCOSE: 66 MG/DL (ref 70–110)
POCT GLUCOSE: 70 MG/DL (ref 70–110)
POCT GLUCOSE: 71 MG/DL (ref 70–110)
POCT GLUCOSE: 71 MG/DL (ref 70–110)
POCT GLUCOSE: 72 MG/DL (ref 70–110)
POCT GLUCOSE: 73 MG/DL (ref 70–110)
POCT GLUCOSE: 73 MG/DL (ref 70–110)
POCT GLUCOSE: 74 MG/DL (ref 70–110)
POCT GLUCOSE: 76 MG/DL (ref 70–110)
POCT GLUCOSE: 76 MG/DL (ref 70–110)
POCT GLUCOSE: 77 MG/DL (ref 70–110)
POCT GLUCOSE: 77 MG/DL (ref 70–110)
POCT GLUCOSE: 78 MG/DL (ref 70–110)
POCT GLUCOSE: 78 MG/DL (ref 70–110)
POCT GLUCOSE: 79 MG/DL (ref 70–110)
POCT GLUCOSE: 80 MG/DL (ref 70–110)
POCT GLUCOSE: 81 MG/DL (ref 70–110)
POCT GLUCOSE: 82 MG/DL (ref 70–110)
POCT GLUCOSE: 82 MG/DL (ref 70–110)
POCT GLUCOSE: 85 MG/DL (ref 70–110)
POCT GLUCOSE: 87 MG/DL (ref 70–110)
POCT GLUCOSE: 89 MG/DL (ref 70–110)
POCT GLUCOSE: 89 MG/DL (ref 70–110)
POCT GLUCOSE: 90 MG/DL (ref 70–110)
POCT GLUCOSE: 91 MG/DL (ref 70–110)
POCT GLUCOSE: 93 MG/DL (ref 70–110)
POCT GLUCOSE: 97 MG/DL (ref 70–110)
POCT GLUCOSE: 97 MG/DL (ref 70–110)
POIKILOCYTOSIS BLD QL SMEAR: ABNORMAL
POIKILOCYTOSIS BLD QL SMEAR: SLIGHT
POLYCHROMASIA BLD QL SMEAR: ABNORMAL
POLYCHROMASIA BLD QL SMEAR: ABNORMAL
POTASSIUM SERPL-SCNC: 3.2 MMOL/L (ref 3.5–5.1)
POTASSIUM SERPL-SCNC: 3.3 MMOL/L (ref 3.5–5.1)
POTASSIUM SERPL-SCNC: 3.3 MMOL/L (ref 3.5–5.1)
POTASSIUM SERPL-SCNC: 3.4 MMOL/L (ref 3.5–5.1)
POTASSIUM SERPL-SCNC: 3.5 MMOL/L (ref 3.5–5.1)
POTASSIUM SERPL-SCNC: 3.6 MMOL/L (ref 3.5–5.1)
POTASSIUM SERPL-SCNC: 3.7 MMOL/L (ref 3.5–5.1)
POTASSIUM SERPL-SCNC: 3.8 MMOL/L (ref 3.5–5.1)
POTASSIUM SERPL-SCNC: 3.9 MMOL/L (ref 3.5–5.1)
POTASSIUM SERPL-SCNC: 4 MMOL/L (ref 3.5–5.1)
POTASSIUM SERPL-SCNC: 4.3 MMOL/L (ref 3.5–5.1)
POTASSIUM SERPL-SCNC: 4.3 MMOL/L (ref 3.5–5.1)
POTASSIUM SERPL-SCNC: 4.6 MMOL/L (ref 3.5–5.1)
POTASSIUM SERPL-SCNC: 4.6 MMOL/L (ref 3.5–5.1)
POTASSIUM SERPL-SCNC: 4.8 MMOL/L (ref 3.5–5.1)
POTASSIUM SERPL-SCNC: 4.8 MMOL/L (ref 3.5–5.1)
POTASSIUM SERPL-SCNC: 4.9 MMOL/L (ref 3.5–5.1)
POTASSIUM SERPL-SCNC: 5.4 MMOL/L (ref 3.5–5.1)
POTASSIUM SERPL-SCNC: 5.4 MMOL/L (ref 3.5–5.1)
POTASSIUM SERPL-SCNC: 5.5 MMOL/L (ref 3.5–5.1)
POTASSIUM SERPL-SCNC: 5.6 MMOL/L (ref 3.5–5.1)
POTASSIUM SERPL-SCNC: 5.6 MMOL/L (ref 3.5–5.1)
POTASSIUM SERPL-SCNC: 5.7 MMOL/L (ref 3.5–5.1)
POTASSIUM SERPL-SCNC: 5.8 MMOL/L (ref 3.5–5.1)
POTASSIUM SERPL-SCNC: 6.1 MMOL/L (ref 3.5–5.1)
POTASSIUM SERPL-SCNC: 6.4 MMOL/L (ref 3.5–5.1)
POTASSIUM SERPL-SCNC: 6.6 MMOL/L (ref 3.5–5.1)
POTASSIUM SERPL-SCNC: 7 MMOL/L (ref 3.5–5.1)
POTASSIUM SERPL-SCNC: 7.3 MMOL/L (ref 3.5–5.1)
PROCALCITONIN SERPL IA-MCNC: 1.39 NG/ML
PROT SERPL-MCNC: 4.4 G/DL (ref 6–8.4)
PROT SERPL-MCNC: 4.4 G/DL (ref 6–8.4)
PROT SERPL-MCNC: 4.5 G/DL (ref 6–8.4)
PROT SERPL-MCNC: 4.6 G/DL (ref 6–8.4)
PROT SERPL-MCNC: 4.7 G/DL (ref 6–8.4)
PROT SERPL-MCNC: 4.7 G/DL (ref 6–8.4)
PROT SERPL-MCNC: 4.8 G/DL (ref 6–8.4)
PROT SERPL-MCNC: 4.9 G/DL (ref 6–8.4)
PROT SERPL-MCNC: 5 G/DL (ref 6–8.4)
PROT SERPL-MCNC: 5.1 G/DL (ref 6–8.4)
PROT SERPL-MCNC: 5.1 G/DL (ref 6–8.4)
PROT SERPL-MCNC: 5.2 G/DL (ref 6–8.4)
PROT SERPL-MCNC: 5.2 G/DL (ref 6–8.4)
PROT SERPL-MCNC: 5.3 G/DL (ref 6–8.4)
PROT SERPL-MCNC: 5.4 G/DL (ref 6–8.4)
PROT SERPL-MCNC: 5.5 G/DL (ref 6–8.4)
PROT SERPL-MCNC: 6.2 G/DL (ref 6.4–8.2)
PROT UR QL STRIP: 300 MG/DL
PROTHROMBIN TIME: 17 SEC (ref 9–12.5)
PROTHROMBIN TIME: 19.9 SEC (ref 9–12.5)
PROTHROMBIN TIME: 22.8 SEC (ref 9–12.5)
PROTHROMBIN TIME: 27 SEC (ref 9–12.5)
PROTHROMBIN TIME: 27 SEC (ref 9–12.5)
PROTHROMBIN TIME: 28.1 SEC (ref 9–12.5)
PROTHROMBIN TIME: 32.2 SEC (ref 9–12.5)
PULM VEIN S/D RATIO: 1.53
PV PEAK D VEL: 0.15 M/S
PV PEAK S VEL: 0.23 M/S
RA MAJOR: 3.95 CM
RA WIDTH: 3.18 CM
RBC # BLD AUTO: 3.49 M/UL (ref 4.6–6.2)
RBC # BLD AUTO: 3.59 M/UL (ref 4.6–6.2)
RBC # BLD AUTO: 3.71 M/UL (ref 4.6–6.2)
RBC # BLD AUTO: 3.95 M/UL (ref 4.69–6.13)
RBC # BLD AUTO: 4 M/UL (ref 4.6–6.2)
RBC # BLD AUTO: 4.02 M/UL (ref 4.6–6.2)
RBC # BLD AUTO: 4.21 M/UL (ref 4.6–6.2)
RBC # BLD AUTO: 4.64 M/UL (ref 4.6–6.2)
RBC #/AREA URNS HPF: 12 /HPF
RIGHT VENTRICULAR END-DIASTOLIC DIMENSION: 2.68 CM
RV TISSUE DOPPLER FREE WALL SYSTOLIC VELOCITY 1 (APICAL 4 CHAMBER VIEW): 5.64 CM/S
SALICYLATE LEVEL: 2.4 MG/DL (ref 2.8–20)
SAM TYPE: ABNORMAL
SAMPLE: ABNORMAL
SARS-COV-2 RNA RESP QL NAA+PROBE: NOT DETECTED
SARSCOV2 INTERNAL CONTROL: NORMAL
SINUS: 2.99 CM
SITE: ABNORMAL
SODIUM BLD-SCNC: 138 MMOL/L (ref 136–145)
SODIUM BLD-SCNC: 142 MMOL/L (ref 136–145)
SODIUM BLD-SCNC: 143 MMOL/L (ref 136–145)
SODIUM BLD-SCNC: 143 MMOL/L (ref 136–145)
SODIUM SERPL-SCNC: 129 MMOL/L (ref 136–145)
SODIUM SERPL-SCNC: 134 MMOL/L (ref 136–145)
SODIUM SERPL-SCNC: 134 MMOL/L (ref 136–145)
SODIUM SERPL-SCNC: 135 MMOL/L (ref 136–145)
SODIUM SERPL-SCNC: 136 MMOL/L (ref 136–145)
SODIUM SERPL-SCNC: 137 MMOL/L (ref 136–145)
SODIUM SERPL-SCNC: 138 MMOL/L (ref 136–145)
SODIUM SERPL-SCNC: 139 MMOL/L (ref 136–145)
SODIUM SERPL-SCNC: 140 MMOL/L (ref 136–145)
SODIUM SERPL-SCNC: 141 MMOL/L (ref 136–145)
SOURCE: ABNORMAL
SP GR UR STRIP: 1.01 (ref 1–1.03)
SP02: 100
SP02: 88
SP02: 88
SP02: 95
SP02: 98
SPERM, URINE MICROSCOPIC: PRESENT /HPF
STJ: 2.58 CM
TARGETS BLD QL SMEAR: ABNORMAL
TDI LATERAL: 0.08 M/S
TDI SEPTAL: 0.06 M/S
TDI: 0.07 M/S
TOXIC GRANULES BLD QL SMEAR: PRESENT
TOXIC GRANULES BLD QL SMEAR: PRESENT
TR MAX PG: 17 MMHG
TRICUSPID ANNULAR PLANE SYSTOLIC EXCURSION: 1.14 CM
TROPONIN I SERPL DL<=0.01 NG/ML-MCNC: 0.24 NG/ML (ref 0–0.05)
TROPONIN I SERPL DL<=0.01 NG/ML-MCNC: 1.56 NG/ML (ref 0–0.03)
TROPONIN I SERPL DL<=0.01 NG/ML-MCNC: 12.42 NG/ML (ref 0–0.03)
TROPONIN I SERPL DL<=0.01 NG/ML-MCNC: 34.54 NG/ML (ref 0–0.03)
TROPONIN I SERPL DL<=0.01 NG/ML-MCNC: 4.65 NG/ML (ref 0–0.03)
TSH SERPL DL<=0.005 MIU/L-ACNC: 1.41 UIU/ML (ref 0.4–4)
TYPE OF SPECIMEN  (UA): NORMAL
UNCLASSIFIED CRYSTALS, UA: NORMAL /HPF
UNIT NUMBER: NORMAL
UROBILINOGEN UR STRIP-ACNC: 1 EU/L
VANCOMYCIN SERPL-MCNC: 10.3 UG/ML
VANCOMYCIN SERPL-MCNC: 14.7 UG/ML
VT: 500
VT: 530
WBC # BLD AUTO: 10.6 10 (ref 4–10.2)
WBC # BLD AUTO: 17.9 K/UL (ref 3.9–12.7)
WBC # BLD AUTO: 20.04 K/UL (ref 3.9–12.7)
WBC # BLD AUTO: 22.3 K/UL (ref 3.9–12.7)
WBC # BLD AUTO: 25.28 K/UL (ref 3.9–12.7)
WBC # BLD AUTO: 25.51 K/UL (ref 3.9–12.7)
WBC # BLD AUTO: 3.95 K/UL (ref 3.9–12.7)
WBC # BLD AUTO: 9.39 K/UL (ref 3.9–12.7)
WBC #/AREA URNS HPF: >900 /HPF
WBC TOXIC VACUOLES BLD QL SMEAR: PRESENT
WBC TOXIC VACUOLES BLD QL SMEAR: PRESENT

## 2020-01-01 PROCEDURE — 20000000 HC ICU ROOM

## 2020-01-01 PROCEDURE — 80053 COMPREHEN METABOLIC PANEL: CPT | Mod: 91

## 2020-01-01 PROCEDURE — 85025 COMPLETE CBC W/AUTO DIFF WBC: CPT

## 2020-01-01 PROCEDURE — 93005 ELECTROCARDIOGRAM TRACING: CPT

## 2020-01-01 PROCEDURE — 82803 BLOOD GASES ANY COMBINATION: CPT

## 2020-01-01 PROCEDURE — 99900035 HC TECH TIME PER 15 MIN (STAT)

## 2020-01-01 PROCEDURE — 84100 ASSAY OF PHOSPHORUS: CPT | Mod: 91

## 2020-01-01 PROCEDURE — 83735 ASSAY OF MAGNESIUM: CPT | Mod: 91

## 2020-01-01 PROCEDURE — 95711 VEEG 2-12 HR UNMONITORED: CPT

## 2020-01-01 PROCEDURE — 63600175 PHARM REV CODE 636 W HCPCS: Performed by: NURSE PRACTITIONER

## 2020-01-01 PROCEDURE — 93010 ELECTROCARDIOGRAM REPORT: CPT | Mod: 77,,, | Performed by: INTERNAL MEDICINE

## 2020-01-01 PROCEDURE — P9017 PLASMA 1 DONOR FRZ W/IN 8 HR: HCPCS

## 2020-01-01 PROCEDURE — 63600175 PHARM REV CODE 636 W HCPCS: Performed by: PSYCHIATRY & NEUROLOGY

## 2020-01-01 PROCEDURE — 37799 UNLISTED PX VASCULAR SURGERY: CPT

## 2020-01-01 PROCEDURE — 99232 SBSQ HOSP IP/OBS MODERATE 35: CPT | Mod: ,,, | Performed by: INTERNAL MEDICINE

## 2020-01-01 PROCEDURE — 25000003 PHARM REV CODE 250: Performed by: PSYCHIATRY & NEUROLOGY

## 2020-01-01 PROCEDURE — 83605 ASSAY OF LACTIC ACID: CPT | Mod: 91

## 2020-01-01 PROCEDURE — 86850 RBC ANTIBODY SCREEN: CPT

## 2020-01-01 PROCEDURE — 82330 ASSAY OF CALCIUM: CPT | Mod: 91

## 2020-01-01 PROCEDURE — 80053 COMPREHEN METABOLIC PANEL: CPT

## 2020-01-01 PROCEDURE — C9113 INJ PANTOPRAZOLE SODIUM, VIA: HCPCS | Performed by: PHYSICIAN ASSISTANT

## 2020-01-01 PROCEDURE — 25000003 PHARM REV CODE 250: Performed by: NURSE PRACTITIONER

## 2020-01-01 PROCEDURE — 31622 DX BRONCHOSCOPE/WASH: CPT

## 2020-01-01 PROCEDURE — 27201463 HC QUATTRO CATH KIT

## 2020-01-01 PROCEDURE — 27200966 HC CLOSED SUCTION SYSTEM

## 2020-01-01 PROCEDURE — 99900026 HC AIRWAY MAINTENANCE (STAT)

## 2020-01-01 PROCEDURE — 90945 DIALYSIS ONE EVALUATION: CPT | Mod: ,,, | Performed by: INTERNAL MEDICINE

## 2020-01-01 PROCEDURE — 80320 DRUG SCREEN QUANTALCOHOLS: CPT

## 2020-01-01 PROCEDURE — 36620 INSERTION CATHETER ARTERY: CPT

## 2020-01-01 PROCEDURE — 63600175 PHARM REV CODE 636 W HCPCS: Performed by: PHYSICIAN ASSISTANT

## 2020-01-01 PROCEDURE — 85610 PROTHROMBIN TIME: CPT

## 2020-01-01 PROCEDURE — 25000003 PHARM REV CODE 250: Performed by: UROLOGY

## 2020-01-01 PROCEDURE — 87040 BLOOD CULTURE FOR BACTERIA: CPT

## 2020-01-01 PROCEDURE — 85730 THROMBOPLASTIN TIME PARTIAL: CPT

## 2020-01-01 PROCEDURE — 82140 ASSAY OF AMMONIA: CPT

## 2020-01-01 PROCEDURE — 84484 ASSAY OF TROPONIN QUANT: CPT | Mod: 91

## 2020-01-01 PROCEDURE — 25000003 PHARM REV CODE 250: Performed by: PHYSICIAN ASSISTANT

## 2020-01-01 PROCEDURE — 36556 INSERT NON-TUNNEL CV CATH: CPT

## 2020-01-01 PROCEDURE — 80100008 HC CRRT DAILY MAINTENANCE

## 2020-01-01 PROCEDURE — 83605 ASSAY OF LACTIC ACID: CPT

## 2020-01-01 PROCEDURE — 83735 ASSAY OF MAGNESIUM: CPT

## 2020-01-01 PROCEDURE — P9012 CRYOPRECIPITATE EACH UNIT: HCPCS

## 2020-01-01 PROCEDURE — 95720 EEG PHY/QHP EA INCR W/VEEG: CPT | Mod: ,,, | Performed by: PSYCHIATRY & NEUROLOGY

## 2020-01-01 PROCEDURE — 97802 MEDICAL NUTRITION INDIV IN: CPT

## 2020-01-01 PROCEDURE — 99292 CRITICAL CARE ADDL 30 MIN: CPT | Mod: 25,,, | Performed by: PSYCHIATRY & NEUROLOGY

## 2020-01-01 PROCEDURE — 36620 INSERTION CATHETER ARTERY: CPT | Mod: ,,, | Performed by: PSYCHIATRY & NEUROLOGY

## 2020-01-01 PROCEDURE — 84100 ASSAY OF PHOSPHORUS: CPT

## 2020-01-01 PROCEDURE — 99232 PR SUBSEQUENT HOSPITAL CARE,LEVL II: ICD-10-PCS | Mod: ,,, | Performed by: INTERNAL MEDICINE

## 2020-01-01 PROCEDURE — 95720 PR EEG, W/VIDEO, CONT RECORD, I&R, >12<26 HRS: ICD-10-PCS | Mod: ,,, | Performed by: PSYCHIATRY & NEUROLOGY

## 2020-01-01 PROCEDURE — 86965 POOLING BLOOD PLATELETS: CPT

## 2020-01-01 PROCEDURE — 85027 COMPLETE CBC AUTOMATED: CPT | Mod: 91

## 2020-01-01 PROCEDURE — 90945 DIALYSIS ONE EVALUATION: CPT

## 2020-01-01 PROCEDURE — 84145 PROCALCITONIN (PCT): CPT

## 2020-01-01 PROCEDURE — 80048 BASIC METABOLIC PNL TOTAL CA: CPT

## 2020-01-01 PROCEDURE — 63600175 PHARM REV CODE 636 W HCPCS: Performed by: INTERNAL MEDICINE

## 2020-01-01 PROCEDURE — 31622 DX BRONCHOSCOPE/WASH: CPT | Mod: ,,, | Performed by: PSYCHIATRY & NEUROLOGY

## 2020-01-01 PROCEDURE — 95700 EEG CONT REC W/VID EEG TECH: CPT

## 2020-01-01 PROCEDURE — 76937 US GUIDE VASCULAR ACCESS: CPT

## 2020-01-01 PROCEDURE — 80069 RENAL FUNCTION PANEL: CPT | Mod: 91

## 2020-01-01 PROCEDURE — 82330 ASSAY OF CALCIUM: CPT

## 2020-01-01 PROCEDURE — 82550 ASSAY OF CK (CPK): CPT

## 2020-01-01 PROCEDURE — 97803 MED NUTRITION INDIV SUBSEQ: CPT

## 2020-01-01 PROCEDURE — 86901 BLOOD TYPING SEROLOGIC RH(D): CPT

## 2020-01-01 PROCEDURE — 25000003 PHARM REV CODE 250: Performed by: INTERNAL MEDICINE

## 2020-01-01 PROCEDURE — 85384 FIBRINOGEN ACTIVITY: CPT

## 2020-01-01 PROCEDURE — 27200188 HC TRANSDUCER, ART ADULT/PEDS

## 2020-01-01 PROCEDURE — 27000221 HC OXYGEN, UP TO 24 HOURS

## 2020-01-01 PROCEDURE — 90945 PR DIALYSIS, NOT HEMO, 1 EVAL: ICD-10-PCS | Mod: ,,, | Performed by: INTERNAL MEDICINE

## 2020-01-01 PROCEDURE — 94761 N-INVAS EAR/PLS OXIMETRY MLT: CPT

## 2020-01-01 PROCEDURE — 80202 ASSAY OF VANCOMYCIN: CPT

## 2020-01-01 PROCEDURE — 93010 ELECTROCARDIOGRAM REPORT: CPT | Mod: ,,, | Performed by: INTERNAL MEDICINE

## 2020-01-01 PROCEDURE — 84484 ASSAY OF TROPONIN QUANT: CPT

## 2020-01-01 PROCEDURE — 99233 PR SUBSEQUENT HOSPITAL CARE,LEVL III: ICD-10-PCS | Mod: ,,, | Performed by: PSYCHIATRY & NEUROLOGY

## 2020-01-01 PROCEDURE — 85384 FIBRINOGEN ACTIVITY: CPT | Mod: 91

## 2020-01-01 PROCEDURE — 94003 VENT MGMT INPAT SUBQ DAY: CPT

## 2020-01-01 PROCEDURE — 80069 RENAL FUNCTION PANEL: CPT

## 2020-01-01 PROCEDURE — 85610 PROTHROMBIN TIME: CPT | Mod: 91

## 2020-01-01 PROCEDURE — 99291 PR CRITICAL CARE, E/M 30-74 MINUTES: ICD-10-PCS | Mod: ,,, | Performed by: PSYCHIATRY & NEUROLOGY

## 2020-01-01 PROCEDURE — 93010 EKG 12-LEAD: ICD-10-PCS | Mod: ,,, | Performed by: INTERNAL MEDICINE

## 2020-01-01 PROCEDURE — U0002 COVID-19 LAB TEST NON-CDC: HCPCS

## 2020-01-01 PROCEDURE — 93010 EKG 12-LEAD: ICD-10-PCS | Mod: 77,,, | Performed by: INTERNAL MEDICINE

## 2020-01-01 PROCEDURE — 84443 ASSAY THYROID STIM HORMONE: CPT

## 2020-01-01 PROCEDURE — 87070 CULTURE OTHR SPECIMN AEROBIC: CPT

## 2020-01-01 PROCEDURE — 36620 ARTERIAL LINE: ICD-10-PCS | Mod: ,,, | Performed by: PSYCHIATRY & NEUROLOGY

## 2020-01-01 PROCEDURE — 95714 VEEG EA 12-26 HR UNMNTR: CPT

## 2020-01-01 PROCEDURE — 25000003 PHARM REV CODE 250: Performed by: GENERAL PRACTICE

## 2020-01-01 PROCEDURE — 31622 PR BRONCHOSCOPY,DIAGNOSTIC: ICD-10-PCS | Mod: ,,, | Performed by: PSYCHIATRY & NEUROLOGY

## 2020-01-01 PROCEDURE — 85730 THROMBOPLASTIN TIME PARTIAL: CPT | Mod: 91

## 2020-01-01 PROCEDURE — 51702 INSERT TEMP BLADDER CATH: CPT

## 2020-01-01 PROCEDURE — 99291 CRITICAL CARE FIRST HOUR: CPT | Mod: ,,, | Performed by: PSYCHIATRY & NEUROLOGY

## 2020-01-01 PROCEDURE — 63600175 PHARM REV CODE 636 W HCPCS: Performed by: GENERAL PRACTICE

## 2020-01-01 PROCEDURE — 83036 HEMOGLOBIN GLYCOSYLATED A1C: CPT

## 2020-01-01 PROCEDURE — 82272 OCCULT BLD FECES 1-3 TESTS: CPT

## 2020-01-01 PROCEDURE — 25000003 PHARM REV CODE 250

## 2020-01-01 PROCEDURE — 82800 BLOOD PH: CPT

## 2020-01-01 PROCEDURE — 82550 ASSAY OF CK (CPK): CPT | Mod: 91

## 2020-01-01 PROCEDURE — 94002 VENT MGMT INPAT INIT DAY: CPT

## 2020-01-01 PROCEDURE — 99292 PR CRITICAL CARE, ADDL 30 MIN: ICD-10-PCS | Mod: 25,,, | Performed by: PSYCHIATRY & NEUROLOGY

## 2020-01-01 PROCEDURE — 99291 CRITICAL CARE FIRST HOUR: CPT | Mod: 25,,, | Performed by: PSYCHIATRY & NEUROLOGY

## 2020-01-01 PROCEDURE — 85007 BL SMEAR W/DIFF WBC COUNT: CPT | Mod: 91

## 2020-01-01 PROCEDURE — 63600175 PHARM REV CODE 636 W HCPCS: Performed by: UROLOGY

## 2020-01-01 PROCEDURE — 99233 SBSQ HOSP IP/OBS HIGH 50: CPT | Mod: ,,, | Performed by: PSYCHIATRY & NEUROLOGY

## 2020-01-01 PROCEDURE — 36556 INSERT NON-TUNNEL CV CATH: CPT | Mod: ,,, | Performed by: PSYCHIATRY & NEUROLOGY

## 2020-01-01 PROCEDURE — 36556 PR INSERT NON-TUNNEL CV CATH 5+ YRS OLD: ICD-10-PCS | Mod: ,,, | Performed by: PSYCHIATRY & NEUROLOGY

## 2020-01-01 PROCEDURE — 25000003 PHARM REV CODE 250: Performed by: HOSPITALIST

## 2020-01-01 PROCEDURE — 99900025 HC BRONCHOSCOPY-ASST (STAT)

## 2020-01-01 PROCEDURE — 99291 PR CRITICAL CARE, E/M 30-74 MINUTES: ICD-10-PCS | Mod: 25,,, | Performed by: PSYCHIATRY & NEUROLOGY

## 2020-01-01 PROCEDURE — 87205 SMEAR GRAM STAIN: CPT

## 2020-01-01 RX ORDER — SODIUM CHLORIDE 9 MG/ML
INJECTION, SOLUTION INTRAVENOUS CONTINUOUS
Status: DISCONTINUED | OUTPATIENT
Start: 2020-01-01 | End: 2020-01-01

## 2020-01-01 RX ORDER — METOPROLOL TARTRATE 1 MG/ML
5 INJECTION, SOLUTION INTRAVENOUS ONCE
Status: COMPLETED | OUTPATIENT
Start: 2020-01-01 | End: 2020-01-01

## 2020-01-01 RX ORDER — HYDROCODONE BITARTRATE AND ACETAMINOPHEN 500; 5 MG/1; MG/1
TABLET ORAL CONTINUOUS
Status: DISCONTINUED | OUTPATIENT
Start: 2020-01-01 | End: 2020-01-01

## 2020-01-01 RX ORDER — MANNITOL 20 G/100ML
100 INJECTION, SOLUTION INTRAVENOUS ONCE
Status: COMPLETED | OUTPATIENT
Start: 2020-01-01 | End: 2020-01-01

## 2020-01-01 RX ORDER — INDOMETHACIN 25 MG/1
100 CAPSULE ORAL ONCE
Status: COMPLETED | OUTPATIENT
Start: 2020-01-01 | End: 2020-01-01

## 2020-01-01 RX ORDER — INDOMETHACIN 25 MG/1
250 CAPSULE ORAL ONCE
Status: COMPLETED | OUTPATIENT
Start: 2020-01-01 | End: 2020-01-01

## 2020-01-01 RX ORDER — HYDROCODONE BITARTRATE AND ACETAMINOPHEN 500; 5 MG/1; MG/1
TABLET ORAL CONTINUOUS
Status: ACTIVE | OUTPATIENT
Start: 2020-01-01 | End: 2020-01-01

## 2020-01-01 RX ORDER — HEPARIN SODIUM 5000 [USP'U]/ML
5000 INJECTION, SOLUTION INTRAVENOUS; SUBCUTANEOUS EVERY 8 HOURS
Status: DISCONTINUED | OUTPATIENT
Start: 2020-01-01 | End: 2020-01-01

## 2020-01-01 RX ORDER — METOPROLOL TARTRATE 1 MG/ML
INJECTION, SOLUTION INTRAVENOUS
Status: DISPENSED
Start: 2020-01-01 | End: 2020-01-01

## 2020-01-01 RX ORDER — AMOXICILLIN 250 MG
1 CAPSULE ORAL DAILY
Status: DISCONTINUED | OUTPATIENT
Start: 2020-01-01 | End: 2020-01-01

## 2020-01-01 RX ORDER — HYDROCODONE BITARTRATE AND ACETAMINOPHEN 500; 5 MG/1; MG/1
TABLET ORAL CONTINUOUS
Status: DISCONTINUED | OUTPATIENT
Start: 2020-01-01 | End: 2020-01-01 | Stop reason: HOSPADM

## 2020-01-01 RX ORDER — METOPROLOL TARTRATE 25 MG/1
25 TABLET, FILM COATED ORAL 3 TIMES DAILY
Status: DISCONTINUED | OUTPATIENT
Start: 2020-01-01 | End: 2020-01-01 | Stop reason: HOSPADM

## 2020-01-01 RX ORDER — GLUCAGON 1 MG
1 KIT INJECTION
Status: DISCONTINUED | OUTPATIENT
Start: 2020-01-01 | End: 2020-01-01 | Stop reason: HOSPADM

## 2020-01-01 RX ORDER — TRANEXAMIC ACID 100 MG/ML
1000 INJECTION, SOLUTION INTRAVENOUS ONCE
Status: COMPLETED | OUTPATIENT
Start: 2020-01-01 | End: 2020-01-01

## 2020-01-01 RX ORDER — MAGNESIUM SULFATE HEPTAHYDRATE 40 MG/ML
2 INJECTION, SOLUTION INTRAVENOUS
Status: DISCONTINUED | OUTPATIENT
Start: 2020-01-01 | End: 2020-01-01

## 2020-01-01 RX ORDER — INSULIN ASPART 100 [IU]/ML
1-10 INJECTION, SOLUTION INTRAVENOUS; SUBCUTANEOUS EVERY 6 HOURS PRN
Status: DISCONTINUED | OUTPATIENT
Start: 2020-01-01 | End: 2020-01-01 | Stop reason: HOSPADM

## 2020-01-01 RX ORDER — HYDROCODONE BITARTRATE AND ACETAMINOPHEN 500; 5 MG/1; MG/1
TABLET ORAL
Status: DISCONTINUED | OUTPATIENT
Start: 2020-01-01 | End: 2020-01-01

## 2020-01-01 RX ORDER — INDOMETHACIN 25 MG/1
CAPSULE ORAL
Status: COMPLETED
Start: 2020-01-01 | End: 2020-01-01

## 2020-01-01 RX ORDER — MIDODRINE HYDROCHLORIDE 5 MG/1
10 TABLET ORAL EVERY 8 HOURS
Status: DISCONTINUED | OUTPATIENT
Start: 2020-01-01 | End: 2020-01-01 | Stop reason: HOSPADM

## 2020-01-01 RX ORDER — FAMOTIDINE 20 MG/1
20 TABLET, FILM COATED ORAL 2 TIMES DAILY
Status: DISCONTINUED | OUTPATIENT
Start: 2020-01-01 | End: 2020-01-01

## 2020-01-01 RX ORDER — ACETAMINOPHEN 325 MG/1
650 TABLET ORAL EVERY 6 HOURS PRN
Status: DISCONTINUED | OUTPATIENT
Start: 2020-01-01 | End: 2020-01-01

## 2020-01-01 RX ORDER — LIDOCAINE HYDROCHLORIDE 20 MG/ML
200 INJECTION, SOLUTION INFILTRATION; PERINEURAL ONCE
Status: DISCONTINUED | OUTPATIENT
Start: 2020-01-01 | End: 2020-01-01

## 2020-01-01 RX ORDER — MIDODRINE HYDROCHLORIDE 5 MG/1
5 TABLET ORAL EVERY 8 HOURS
Status: DISCONTINUED | OUTPATIENT
Start: 2020-01-01 | End: 2020-01-01

## 2020-01-01 RX ORDER — LORAZEPAM 2 MG/ML
1 INJECTION INTRAMUSCULAR EVERY 30 MIN PRN
Status: DISCONTINUED | OUTPATIENT
Start: 2020-01-01 | End: 2020-01-01 | Stop reason: HOSPADM

## 2020-01-01 RX ORDER — MAGNESIUM SULFATE HEPTAHYDRATE 40 MG/ML
2 INJECTION, SOLUTION INTRAVENOUS
Status: DISCONTINUED | OUTPATIENT
Start: 2020-01-01 | End: 2020-01-01 | Stop reason: HOSPADM

## 2020-01-01 RX ORDER — NICARDIPINE HYDROCHLORIDE 0.2 MG/ML
INJECTION INTRAVENOUS
Status: DISPENSED
Start: 2020-01-01 | End: 2020-01-01

## 2020-01-01 RX ORDER — MANNITOL 20 G/100ML
INJECTION, SOLUTION INTRAVENOUS
Status: COMPLETED
Start: 2020-01-01 | End: 2020-01-01

## 2020-01-01 RX ORDER — NOREPINEPHRINE BITARTRATE/D5W 4MG/250ML
0.02 PLASTIC BAG, INJECTION (ML) INTRAVENOUS CONTINUOUS
Status: DISCONTINUED | OUTPATIENT
Start: 2020-01-01 | End: 2020-01-01

## 2020-01-01 RX ORDER — DEXTROSE MONOHYDRATE 100 MG/ML
INJECTION, SOLUTION INTRAVENOUS CONTINUOUS
Status: DISCONTINUED | OUTPATIENT
Start: 2020-01-01 | End: 2020-01-01 | Stop reason: HOSPADM

## 2020-01-01 RX ORDER — ONDANSETRON 2 MG/ML
4 INJECTION INTRAMUSCULAR; INTRAVENOUS EVERY 6 HOURS PRN
Status: DISCONTINUED | OUTPATIENT
Start: 2020-01-01 | End: 2020-01-01 | Stop reason: HOSPADM

## 2020-01-01 RX ORDER — MAGNESIUM SULFATE HEPTAHYDRATE 40 MG/ML
2 INJECTION, SOLUTION INTRAVENOUS
Status: DISPENSED | OUTPATIENT
Start: 2020-01-01 | End: 2020-01-01

## 2020-01-01 RX ORDER — CALCIUM CHLORIDE INJECTION 100 MG/ML
2 INJECTION, SOLUTION INTRAVENOUS ONCE
Status: DISCONTINUED | OUTPATIENT
Start: 2020-01-01 | End: 2020-01-01

## 2020-01-01 RX ORDER — MORPHINE SULFATE/0.9% NACL/PF 1 MG/ML
1 PLASTIC BAG, INJECTION (ML) INTRAVENOUS CONTINUOUS
Status: DISCONTINUED | OUTPATIENT
Start: 2020-01-01 | End: 2020-01-01 | Stop reason: HOSPADM

## 2020-01-01 RX ORDER — ATROPINE SULFATE 10 MG/ML
2 SOLUTION/ DROPS OPHTHALMIC EVERY 4 HOURS PRN
Status: DISCONTINUED | OUTPATIENT
Start: 2020-01-01 | End: 2020-01-01 | Stop reason: HOSPADM

## 2020-01-01 RX ORDER — FAMOTIDINE 20 MG/1
20 TABLET, FILM COATED ORAL DAILY
Status: DISCONTINUED | OUTPATIENT
Start: 2020-01-01 | End: 2020-01-01

## 2020-01-01 RX ORDER — LACTULOSE 10 G/15ML
10 SOLUTION ORAL 3 TIMES DAILY
Status: DISCONTINUED | OUTPATIENT
Start: 2020-01-01 | End: 2020-01-01 | Stop reason: HOSPADM

## 2020-01-01 RX ORDER — METOPROLOL TARTRATE 25 MG/1
25 TABLET, FILM COATED ORAL 2 TIMES DAILY
Status: DISCONTINUED | OUTPATIENT
Start: 2020-01-01 | End: 2020-01-01

## 2020-01-01 RX ORDER — CHLORHEXIDINE GLUCONATE ORAL RINSE 1.2 MG/ML
15 SOLUTION DENTAL 2 TIMES DAILY
Status: DISCONTINUED | OUTPATIENT
Start: 2020-01-01 | End: 2020-01-01 | Stop reason: HOSPADM

## 2020-01-01 RX ORDER — PANTOPRAZOLE SODIUM 40 MG/1
40 FOR SUSPENSION ORAL 2 TIMES DAILY
Status: DISCONTINUED | OUTPATIENT
Start: 2020-01-01 | End: 2020-01-01

## 2020-01-01 RX ORDER — PANTOPRAZOLE SODIUM 40 MG/10ML
80 INJECTION, POWDER, LYOPHILIZED, FOR SOLUTION INTRAVENOUS ONCE
Status: COMPLETED | OUTPATIENT
Start: 2020-01-01 | End: 2020-01-01

## 2020-01-01 RX ADMIN — AMPICILLIN SODIUM AND SULBACTAM SODIUM 3 G: 2; 1 INJECTION, POWDER, FOR SOLUTION INTRAMUSCULAR; INTRAVENOUS at 07:05

## 2020-01-01 RX ADMIN — VASOPRESSIN 0.04 UNITS/MIN: 20 INJECTION INTRAVENOUS at 08:05

## 2020-01-01 RX ADMIN — DEXTROSE 125 ML: 10 SOLUTION INTRAVENOUS at 08:05

## 2020-01-01 RX ADMIN — FAMOTIDINE 20 MG: 20 TABLET, FILM COATED ORAL at 11:05

## 2020-01-01 RX ADMIN — MIDODRINE HYDROCHLORIDE 10 MG: 5 TABLET ORAL at 09:05

## 2020-01-01 RX ADMIN — RIFAXIMIN 550 MG: 550 TABLET ORAL at 09:05

## 2020-01-01 RX ADMIN — DEXTROSE 8 MG/HR: 50 INJECTION, SOLUTION INTRAVENOUS at 02:05

## 2020-01-01 RX ADMIN — AMPICILLIN SODIUM AND SULBACTAM SODIUM 3 G: 2; 1 INJECTION, POWDER, FOR SOLUTION INTRAMUSCULAR; INTRAVENOUS at 02:05

## 2020-01-01 RX ADMIN — MAGNESIUM SULFATE IN WATER 2 G: 40 INJECTION, SOLUTION INTRAVENOUS at 12:05

## 2020-01-01 RX ADMIN — VANCOMYCIN HYDROCHLORIDE 1750 MG: 750 INJECTION, POWDER, LYOPHILIZED, FOR SOLUTION INTRAVENOUS at 09:05

## 2020-01-01 RX ADMIN — VASOPRESSIN 0.04 UNITS/MIN: 20 INJECTION INTRAVENOUS at 12:05

## 2020-01-01 RX ADMIN — DEXTROSE 8 MG/HR: 50 INJECTION, SOLUTION INTRAVENOUS at 12:05

## 2020-01-01 RX ADMIN — NOREPINEPHRINE BITARTRATE 0.38 MCG/KG/MIN: 1 INJECTION, SOLUTION, CONCENTRATE INTRAVENOUS at 12:05

## 2020-01-01 RX ADMIN — DEXTROSE 125 ML: 10 SOLUTION INTRAVENOUS at 12:05

## 2020-01-01 RX ADMIN — LACTULOSE 10 G: 20 SOLUTION ORAL at 08:05

## 2020-01-01 RX ADMIN — SODIUM BICARBONATE 100 MEQ: 84 INJECTION, SOLUTION INTRAVENOUS at 12:05

## 2020-01-01 RX ADMIN — SODIUM CHLORIDE: 0.9 INJECTION, SOLUTION INTRAVENOUS at 12:05

## 2020-01-01 RX ADMIN — HYDROCORTISONE SODIUM SUCCINATE 100 MG: 100 INJECTION, POWDER, FOR SOLUTION INTRAMUSCULAR; INTRAVENOUS at 02:05

## 2020-01-01 RX ADMIN — RIFAXIMIN 550 MG: 550 TABLET ORAL at 10:05

## 2020-01-01 RX ADMIN — DEXTROSE 8 MG/HR: 50 INJECTION, SOLUTION INTRAVENOUS at 08:05

## 2020-01-01 RX ADMIN — HYDROCORTISONE SODIUM SUCCINATE 100 MG: 100 INJECTION, POWDER, FOR SOLUTION INTRAMUSCULAR; INTRAVENOUS at 05:05

## 2020-01-01 RX ADMIN — DEXTROSE 250 ML: 10 SOLUTION INTRAVENOUS at 05:05

## 2020-01-01 RX ADMIN — PHYTONADIONE 5 MG: 10 INJECTION, EMULSION INTRAMUSCULAR; INTRAVENOUS; SUBCUTANEOUS at 08:05

## 2020-01-01 RX ADMIN — DEXTROSE 8 MG/HR: 50 INJECTION, SOLUTION INTRAVENOUS at 09:05

## 2020-01-01 RX ADMIN — DEXTROSE 8 MG/HR: 50 INJECTION, SOLUTION INTRAVENOUS at 05:05

## 2020-01-01 RX ADMIN — HUMAN ALBUMIN MICROSPHERES AND PERFLUTREN 0.11 MG: 10; .22 INJECTION, SOLUTION INTRAVENOUS at 10:05

## 2020-01-01 RX ADMIN — CALCIUM CHLORIDE 2 G: 100 INJECTION INTRAVENOUS; INTRAVENTRICULAR at 11:05

## 2020-01-01 RX ADMIN — HEPARIN SODIUM 5000 UNITS: 5000 INJECTION INTRAVENOUS; SUBCUTANEOUS at 06:05

## 2020-01-01 RX ADMIN — MIDODRINE HYDROCHLORIDE 10 MG: 5 TABLET ORAL at 06:05

## 2020-01-01 RX ADMIN — AMPICILLIN SODIUM AND SULBACTAM SODIUM 1.5 G: 1; .5 INJECTION, POWDER, FOR SOLUTION INTRAMUSCULAR; INTRAVENOUS at 07:05

## 2020-01-01 RX ADMIN — HEPARIN SODIUM 5000 UNITS: 5000 INJECTION INTRAVENOUS; SUBCUTANEOUS at 03:05

## 2020-01-01 RX ADMIN — LACTULOSE 10 G: 20 SOLUTION ORAL at 09:05

## 2020-01-01 RX ADMIN — VANCOMYCIN HYDROCHLORIDE 1250 MG: 1.25 INJECTION, POWDER, LYOPHILIZED, FOR SOLUTION INTRAVENOUS at 09:05

## 2020-01-01 RX ADMIN — CHLORHEXIDINE GLUCONATE 0.12% ORAL RINSE 15 ML: 1.2 LIQUID ORAL at 08:05

## 2020-01-01 RX ADMIN — SODIUM PHOSPHATE, MONOBASIC, MONOHYDRATE 39.99 MMOL: 276; 142 INJECTION, SOLUTION INTRAVENOUS at 03:05

## 2020-01-01 RX ADMIN — CALCIUM GLUCONATE 2000 MG: 98 INJECTION, SOLUTION INTRAVENOUS at 01:05

## 2020-01-01 RX ADMIN — MIDODRINE HYDROCHLORIDE 10 MG: 5 TABLET ORAL at 02:05

## 2020-01-01 RX ADMIN — INSULIN HUMAN 8.93 UNITS: 100 INJECTION, SOLUTION PARENTERAL at 12:05

## 2020-01-01 RX ADMIN — DEXTROSE 8 MG/HR: 50 INJECTION, SOLUTION INTRAVENOUS at 10:05

## 2020-01-01 RX ADMIN — METOPROLOL TARTRATE 25 MG: 25 TABLET ORAL at 09:05

## 2020-01-01 RX ADMIN — MIDODRINE HYDROCHLORIDE 5 MG: 5 TABLET ORAL at 10:05

## 2020-01-01 RX ADMIN — CHLORHEXIDINE GLUCONATE 0.12% ORAL RINSE 15 ML: 1.2 LIQUID ORAL at 10:05

## 2020-01-01 RX ADMIN — Medication 0.14 MCG/KG/MIN: at 04:05

## 2020-01-01 RX ADMIN — SODIUM PHOSPHATE, MONOBASIC, MONOHYDRATE 30 MMOL: 276; 142 INJECTION, SOLUTION INTRAVENOUS at 05:05

## 2020-01-01 RX ADMIN — AMPICILLIN SODIUM AND SULBACTAM SODIUM 3 G: 2; 1 INJECTION, POWDER, FOR SOLUTION INTRAMUSCULAR; INTRAVENOUS at 08:05

## 2020-01-01 RX ADMIN — LEVOTHYROXINE SODIUM ANHYDROUS 10 MCG/HR: 100 INJECTION, POWDER, LYOPHILIZED, FOR SOLUTION INTRAVENOUS at 07:05

## 2020-01-01 RX ADMIN — LACTULOSE 10 G: 20 SOLUTION ORAL at 02:05

## 2020-01-01 RX ADMIN — MANNITOL 100 G: 20 INJECTION, SOLUTION INTRAVENOUS at 08:05

## 2020-01-01 RX ADMIN — Medication 0.14 MCG/KG/MIN: at 05:05

## 2020-01-01 RX ADMIN — EPINEPHRINE 0.02 MCG/KG/MIN: 1 INJECTION INTRAMUSCULAR; INTRAVENOUS; SUBCUTANEOUS at 06:05

## 2020-01-01 RX ADMIN — NOREPINEPHRINE BITARTRATE 1.7 MCG/KG/MIN: 1 INJECTION, SOLUTION, CONCENTRATE INTRAVENOUS at 08:05

## 2020-01-01 RX ADMIN — HYDROCORTISONE SODIUM SUCCINATE 100 MG: 100 INJECTION, POWDER, FOR SOLUTION INTRAMUSCULAR; INTRAVENOUS at 09:05

## 2020-01-01 RX ADMIN — NOREPINEPHRINE BITARTRATE 1.6 MCG/KG/MIN: 1 INJECTION, SOLUTION, CONCENTRATE INTRAVENOUS at 04:05

## 2020-01-01 RX ADMIN — VASOPRESSIN 0.04 UNITS/MIN: 20 INJECTION INTRAVENOUS at 04:05

## 2020-01-01 RX ADMIN — METOPROLOL TARTRATE 5 MG: 5 INJECTION INTRAVENOUS at 06:05

## 2020-01-01 RX ADMIN — TRANEXAMIC ACID 1000 MG: 100 INJECTION, SOLUTION INTRAVENOUS at 04:05

## 2020-01-01 RX ADMIN — SODIUM CHLORIDE: 0.9 INJECTION, SOLUTION INTRAVENOUS at 10:05

## 2020-01-01 RX ADMIN — VASOPRESSIN 0.08 UNITS/MIN: 20 INJECTION INTRAVENOUS at 05:05

## 2020-01-01 RX ADMIN — NOREPINEPHRINE BITARTRATE 1.5 MCG/KG/MIN: 1 INJECTION, SOLUTION, CONCENTRATE INTRAVENOUS at 12:05

## 2020-01-01 RX ADMIN — NOREPINEPHRINE BITARTRATE 0.9 MCG/KG/MIN: 1 INJECTION, SOLUTION, CONCENTRATE INTRAVENOUS at 08:05

## 2020-01-01 RX ADMIN — DEXTROSE 125 ML: 10 SOLUTION INTRAVENOUS at 09:05

## 2020-01-01 RX ADMIN — SODIUM CHLORIDE: 0.9 INJECTION, SOLUTION INTRAVENOUS at 05:05

## 2020-01-01 RX ADMIN — Medication 0.14 MCG/KG/MIN: at 08:05

## 2020-01-01 RX ADMIN — METOPROLOL TARTRATE 25 MG: 25 TABLET ORAL at 02:05

## 2020-01-01 RX ADMIN — AMPICILLIN SODIUM AND SULBACTAM SODIUM 1.5 G: 1; .5 INJECTION, POWDER, FOR SOLUTION INTRAMUSCULAR; INTRAVENOUS at 02:05

## 2020-01-01 RX ADMIN — VASOPRESSIN 0.04 UNITS/MIN: 20 INJECTION INTRAVENOUS at 03:05

## 2020-01-01 RX ADMIN — RIFAXIMIN 550 MG: 550 TABLET ORAL at 08:05

## 2020-01-01 RX ADMIN — CHLORHEXIDINE GLUCONATE 0.12% ORAL RINSE 15 ML: 1.2 LIQUID ORAL at 09:05

## 2020-01-01 RX ADMIN — NOREPINEPHRINE BITARTRATE 0.5 MCG/KG/MIN: 1 INJECTION, SOLUTION, CONCENTRATE INTRAVENOUS at 11:05

## 2020-01-01 RX ADMIN — DEXTROSE 8 MG/HR: 50 INJECTION, SOLUTION INTRAVENOUS at 07:05

## 2020-01-01 RX ADMIN — VANCOMYCIN HYDROCHLORIDE 1250 MG: 1.25 INJECTION, POWDER, LYOPHILIZED, FOR SOLUTION INTRAVENOUS at 11:05

## 2020-01-01 RX ADMIN — DEXTROSE: 10 SOLUTION INTRAVENOUS at 07:05

## 2020-01-01 RX ADMIN — METOPROLOL TARTRATE 25 MG: 25 TABLET ORAL at 08:05

## 2020-01-01 RX ADMIN — CALCIUM GLUCONATE 1000 MG: 98 INJECTION, SOLUTION INTRAVENOUS at 06:05

## 2020-01-01 RX ADMIN — SODIUM CHLORIDE: 0.9 INJECTION, SOLUTION INTRAVENOUS at 06:05

## 2020-01-01 RX ADMIN — MAGNESIUM SULFATE IN WATER 2 G: 40 INJECTION, SOLUTION INTRAVENOUS at 05:05

## 2020-01-01 RX ADMIN — DEXTROSE 250 ML: 10 SOLUTION INTRAVENOUS at 04:05

## 2020-01-01 RX ADMIN — HEPARIN SODIUM 5000 UNITS: 5000 INJECTION INTRAVENOUS; SUBCUTANEOUS at 09:05

## 2020-01-01 RX ADMIN — VASOPRESSIN 0.04 UNITS/MIN: 20 INJECTION INTRAVENOUS at 07:05

## 2020-01-01 RX ADMIN — Medication 0.08 MCG/KG/MIN: at 03:05

## 2020-01-01 RX ADMIN — VASOPRESSIN 0.08 UNITS/MIN: 20 INJECTION INTRAVENOUS at 12:05

## 2020-01-01 RX ADMIN — DEXTROSE MONOHYDRATE 25 G: 25 INJECTION, SOLUTION INTRAVENOUS at 11:05

## 2020-01-01 RX ADMIN — VASOPRESSIN 0.08 UNITS/MIN: 20 INJECTION INTRAVENOUS at 01:05

## 2020-01-01 RX ADMIN — SODIUM BICARBONATE 250 MEQ: 84 INJECTION, SOLUTION INTRAVENOUS at 11:05

## 2020-01-01 RX ADMIN — Medication 0.2 MCG/KG/MIN: at 07:05

## 2020-01-01 RX ADMIN — VASOPRESSIN 0.08 UNITS/MIN: 20 INJECTION INTRAVENOUS at 04:05

## 2020-01-01 RX ADMIN — AMPICILLIN SODIUM AND SULBACTAM SODIUM 3 G: 2; 1 INJECTION, POWDER, FOR SOLUTION INTRAMUSCULAR; INTRAVENOUS at 01:05

## 2020-01-01 RX ADMIN — PHYTONADIONE 5 MG: 10 INJECTION, EMULSION INTRAMUSCULAR; INTRAVENOUS; SUBCUTANEOUS at 11:05

## 2020-01-01 RX ADMIN — NOREPINEPHRINE BITARTRATE 0.7 MCG/KG/MIN: 1 INJECTION, SOLUTION, CONCENTRATE INTRAVENOUS at 01:05

## 2020-01-01 RX ADMIN — Medication 0.18 MCG/KG/MIN: at 03:05

## 2020-01-01 RX ADMIN — VASOPRESSIN 0.08 UNITS/MIN: 20 INJECTION INTRAVENOUS at 08:05

## 2020-01-01 RX ADMIN — DEXTROSE 125 ML: 10 SOLUTION INTRAVENOUS at 03:05

## 2020-01-01 RX ADMIN — LACTULOSE 10 G: 20 SOLUTION ORAL at 10:05

## 2020-01-01 RX ADMIN — AMPICILLIN SODIUM AND SULBACTAM SODIUM 1.5 G: 1; .5 INJECTION, POWDER, FOR SOLUTION INTRAMUSCULAR; INTRAVENOUS at 09:05

## 2020-01-01 RX ADMIN — Medication 0.26 MCG/KG/MIN: at 10:05

## 2020-01-01 RX ADMIN — LEVOTHYROXINE SODIUM ANHYDROUS 10 MCG/HR: 100 INJECTION, POWDER, LYOPHILIZED, FOR SOLUTION INTRAVENOUS at 10:05

## 2020-01-01 RX ADMIN — INDOMETHACIN 250 MEQ: 25 CAPSULE ORAL at 11:05

## 2020-01-01 RX ADMIN — PANTOPRAZOLE SODIUM 80 MG: 40 INJECTION, POWDER, FOR SOLUTION INTRAVENOUS at 03:05

## 2020-01-01 RX ADMIN — NOREPINEPHRINE BITARTRATE 0.76 MCG/KG/MIN: 1 INJECTION, SOLUTION, CONCENTRATE INTRAVENOUS at 01:05

## 2020-01-01 RX ADMIN — HEPARIN SODIUM 5000 UNITS: 5000 INJECTION INTRAVENOUS; SUBCUTANEOUS at 10:05

## 2020-01-01 RX ADMIN — MIDODRINE HYDROCHLORIDE 10 MG: 5 TABLET ORAL at 05:05

## 2020-01-01 RX ADMIN — METOPROLOL TARTRATE 25 MG: 25 TABLET ORAL at 06:05

## 2020-01-01 RX ADMIN — DEXTROSE: 10 SOLUTION INTRAVENOUS at 01:05

## 2020-01-01 RX ADMIN — AMPICILLIN SODIUM AND SULBACTAM SODIUM 3 G: 2; 1 INJECTION, POWDER, FOR SOLUTION INTRAMUSCULAR; INTRAVENOUS at 03:05

## 2020-01-01 RX ADMIN — VASOPRESSIN 0.08 UNITS/MIN: 20 INJECTION INTRAVENOUS at 09:05

## 2020-01-01 RX ADMIN — SODIUM CHLORIDE: 0.9 INJECTION, SOLUTION INTRAVENOUS at 02:05

## 2020-01-01 RX ADMIN — Medication 0.02 MCG/KG/MIN: at 04:05

## 2020-05-05 PROBLEM — R74.01 TRANSAMINITIS: Status: ACTIVE | Noted: 2020-01-01

## 2020-05-05 PROBLEM — M62.82 RHABDOMYOLYSIS: Status: ACTIVE | Noted: 2020-01-01

## 2020-05-05 PROBLEM — R79.89 TROPONIN LEVEL ELEVATED: Status: ACTIVE | Noted: 2020-01-01

## 2020-05-05 PROBLEM — I95.9 HYPOTENSION: Status: ACTIVE | Noted: 2020-01-01

## 2020-05-05 PROBLEM — I46.9 CARDIAC ARREST: Status: ACTIVE | Noted: 2020-01-01

## 2020-05-05 PROBLEM — E87.20 LACTIC ACIDOSIS: Status: ACTIVE | Noted: 2020-01-01

## 2020-05-05 PROBLEM — T50.901A OVERDOSE: Status: ACTIVE | Noted: 2020-01-01

## 2020-05-05 PROBLEM — N17.9 AKI (ACUTE KIDNEY INJURY): Status: ACTIVE | Noted: 2020-01-01

## 2020-05-05 NOTE — HPI
Mr. Olivares is a 38 yo man with PMH of opioid abuse admitted as a transfer from [facility] for possible cocaine and amphetamine overdose. Patient has a hx of chronic back pain and was found down by his family at 6am this morning snoring on the couch, and when by 7:30 am his family realized he was unresponsive they called EMS.  CPR was performed by a bystander until EMS arrived and placed an Ambu® Spike LTS-D laryngeal tube.  Patient was in asystole when EMS got to the scene and was given given Epi  and 2 mg of Narcan. Patient then went into ventricular fibrillation, and was defibrillated x 1, and arrived in the ED in NSR.     Per transfer note, Patient was hypotensive after arrival to lowest of 51/41, then gradually went to 87/66, then 117/58 by 1pm.  Patient's pupils were found to be fixed and dilated with agonal respirations.  He had track marks to right AC.  He was subsequently intubated in the ED after arrival.  Patient was cooled for less than 12 hours prior to arrival, will continue for 24hrs     Upon Labs revealed elevated LA to 16, Cr of 2, K of 6.3.  His UDS was positive for amphetamines and cocaine.  COVID was negative at [facility], repeat COVID test pending.  CT chest showed debris/mass or secretion in the right proximal lower lobe bronchus along with intraluminal calcifications (?broncholiths).  Recommend bronchoscopy.  Right lower lobe atelectasis with shift of the mediastinum to the right.  Centrilobar emphysema with patchy right upper lobe pneumonia.  Smaller pneumonia in the left upper lobe along the fissure and significant bibasilar infiltrates c/w multifocal pneumonia.       He was given Na Bicarb iv x 4, had NGT and roque placed, Kayexalate 30g, 10 U insulin and CaGluconate.  He was started on Levophed for the hypotension at 4 mamie/min.  He was given empiric Zosyn 4.5g iv x 1 and Zyvox 500mg iv x 1 after blood cultures.  His repeat K after above therapies was 5.7.     [place] [facility] for higher level of care, Video EEG monitoring began, A-line and Femoral line placed. Will continue current cooling mgt.

## 2020-05-05 NOTE — PROCEDURES
Say Olivares is a 39 y.o. male patient.    Temp: (!) 90.5 °F (32.5 °C) (05/05/20 1745)  Pulse: 82 (05/05/20 1745)  Resp: (!) 28 (05/05/20 1745)  BP: 129/89 (05/05/20 1745)  SpO2: (!) 94 % (05/05/20 1745)  Weight: 89.3 kg (196 lb 13.9 oz) (05/05/20 1650)       Arterial Line  Date/Time: 5/5/2020 5:35 PM  Location procedure was performed: [facility] [place] NEURO CRITICAL CARE  Performed by: Shoaib Lagos MD  Authorized by: Marcellus Andrade MD   Pre-op Diagnosis: Cardiac arrest  Consent Done: Emergent Situation  Preparation: Patient was prepped and draped in the usual sterile fashion.  Indications: multiple ABGs, respiratory failure and hemodynamic monitoring  Location: right radial  Anesthesia: see MAR for details  Patient sedated: no  Isidro's test normal: yes  Needle gauge: 20  Seldinger technique: Seldinger technique used  Number of attempts: 1  Technical procedures used: Ultrasound  Estimated blood loss (mL): 0  Post-procedure: dressing applied  Post-procedure CMS: normal  Patient tolerance: Patient tolerated the procedure well with no immediate complications          Shoaib Lagos  5/5/2020

## 2020-05-05 NOTE — SUBJECTIVE & OBJECTIVE
No past medical history on file.  No past surgical history on file.   No current facility-administered medications on file prior to encounter.      No current outpatient medications on file prior to encounter.      Allergies: Patient has no known allergies.    No family history on file.  Social History     Tobacco Use    Smoking status: Not on file   Substance Use Topics    Alcohol use: Not on file    Drug use: Not on file     Review of Systems   Constitutional: Negative for chills, fever and unexpected weight change.   HENT: Negative for ear pain, nosebleeds and sore throat.    Respiratory: Negative for cough, shortness of breath and wheezing.         INTUBATED   Cardiovascular: Negative for chest pain, palpitations and leg swelling.   Gastrointestinal: Negative for abdominal pain, constipation, diarrhea, nausea and vomiting.   Genitourinary: Negative for difficulty urinating, frequency and urgency.   Musculoskeletal: Negative for back pain, joint swelling and neck pain.   Skin: Negative for pallor.   Allergic/Immunologic: Negative for food allergies.   Neurological: Negative for dizziness, tremors, seizures, syncope, speech difficulty, weakness, numbness and headaches.        COMATOSE   Psychiatric/Behavioral: Negative for behavioral problems and sleep disturbance.       Objective:     Vitals:    Temp: (!) 89.8 °F (32.1 °C)  Pulse: 78  BP: (!) 154/106  MAP (mmHg): 122  Resp: (!) 29  SpO2: (can not obtain )  Oxygen Concentration (%): 100  O2 Device (Oxygen Therapy): ventilator  Vent Mode: A/C  Set Rate: 28 BPM  Vt Set: 500 mL  PEEP/CPAP: 5 cmH20  Peak Airway Pressure: 19 cmH2O  Mean Airway Pressure: 12 cmH20  Plateau Pressure: 0 cmH20    Temp  Min: 89.8 °F (32.1 °C)  Max: 92.8 °F (33.8 °C)  Pulse  Min: 75  Max: 80  BP  Min: 119/74  Max: 154/106  MAP (mmHg)  Min: 122  Max: 122  Resp  Min: 28  Max: 29  SpO2  Min: 91 %  Max: 100 %  Oxygen Concentration (%)  Min: 100  Max: 100    No intake/output data recorded.            Physical Exam   Constitutional: He appears well-developed and well-nourished. He is intubated.   HENT:   Head: Normocephalic and atraumatic.   Eyes: Pupils are equal, round, and reactive to light. Conjunctivae and EOM are normal.   Neck: Normal range of motion. Neck supple.   Cardiovascular: Normal rate, regular rhythm, normal heart sounds and intact distal pulses. Exam reveals no gallop and no friction rub.   No murmur heard.  Pulmonary/Chest: Effort normal and breath sounds normal. No stridor. He is intubated. No respiratory distress. He has no wheezes. He has no rales. He exhibits no tenderness.   Abdominal: Soft. Bowel sounds are normal. He exhibits no distension and no mass. There is no tenderness. There is no rebound and no guarding. No hernia.   Musculoskeletal: Normal range of motion.   Neurological: He is unresponsive. He displays no atrophy, no tremor and normal reflexes. No cranial nerve deficit or sensory deficit. He exhibits normal muscle tone. He displays no seizure activity. Gait normal. GCS eye subscore is 1. GCS verbal subscore is 1. GCS motor subscore is 1.   Reflex Scores:       Tricep reflexes are 2+ on the right side and 2+ on the left side.       Bicep reflexes are 2+ on the right side and 2+ on the left side.       Brachioradialis reflexes are 2+ on the right side and 2+ on the left side.       Patellar reflexes are 2+ on the right side and 2+ on the left side.       Achilles reflexes are 2+ on the right side and 2+ on the left side.  Skin: Skin is warm and dry. Capillary refill takes less than 2 seconds.   Psychiatric: He has a normal mood and affect. His behavior is normal. Judgment and thought content normal.        Unable to test orientation, language, memory, judgment, insight, fund of knowledge, hearing, shoulder shrug, tongue protrusion, coordination, gait due to level of consciousness.    Today I personally reviewed pertinent medications, lines/drains/airways, imaging, cardiology  results, laboratory results, notably:    Chest Xray 05/05/20  Endotracheal tube at the level of the clavicular heads.  Enteric tube in place with tip below the diaphragm out of field of view.    Right basilar opacity obscuring the diaphragm and right heart with some volume loss as well as scattered air bronchograms and bandlike opacities likely representing combination of atelectasis/infiltrate including aspiration or pneumonia.  There is hazy opacification with diffuse nonspecific interstitial coarsening of the aerated right mid to upper lung which could reflect pulmonary edema or nonspecific interstitial pneumonia.  There is nonspecific thickening of the right paratracheal stripe.  There is suspected right pleural effusion extending to the apex, likely moderate in volume.  Left hemithorax is well expanded and grossly clear.  No pneumothorax.  No acute osseous process seen.      Impression       As above.

## 2020-05-05 NOTE — ASSESSMENT & PLAN NOTE
39 year old man found down this morning by family s/p CPR transferred after cooling process begun 12 hours ago, Utox positive for Cocaine and Amphetamines,     Neuro:    Seizures/Epilepsy Monitoring Evaluation:  - Continue current management.  - Seizure Precautions  - Continue vEEG monitoring   -- CT Head pending  -- SBP goal MAP >65  --PT/OT/Speech    Pulmonary:   Acute Respiratory Failure   -- Daily CXR  -- Daily ABGs   Vent Mode: A/C  Oxygen Concentration (%):  [100] 100  Resp Rate Total:  [28 br/min] 28 br/min  Vt Set:  [500 mL] 500 mL  PEEP/CPAP:  [5 cmH20] 5 cmH20  Mean Airway Pressure:  [12 cmH20] 12 cmH20    Cardiac:   Hypotension  -- Continue to monitor HR and BP   --MAP goal >65  -- 2D echo pending      Renal:   --Continue to monitor I/O  --Continue to monitor BUN/Cr  -- BUN 21; Creatinine 1.9  -- PO4 elevated at 9.1 consider Sevalamer  -- follow labs CMP q12    ID:   -- hypothermmia cooling to 34 degrees C   -- No leukocytosis     Hem/Onc:   --continue to monitor H/H       Endocrine: A1C 5.6  --Continue to monitor BG  -- Continue sliding scale  -- POCT q 6      Fluids/Electrolytes/Nutrition/GI:   - Start TF  -- Continue to monitor and replace electrolytes accordingly    PPX   -PUD: Pantoprazole 40 mg daily  -DVT: : heparin 5000 units q 8, TCD, SCD    Uninterrupted Critical Care/Counseling Time (not including procedures):  >50 min    Vladimir Rubin MD  Neurocritical Care  [facility]    -

## 2020-05-05 NOTE — HOSPITAL COURSE
05/05/2020: Patient admitted as a transfer from [facility] for possible amphetamine and cocaine overdose. Video EEG monitoring began, A-line and Femoral line placed. Will continue current cooling mgt  05/06/2020: Patient had bronchoscopy last night done by Dr. Andrade and had a cocaine rock taken out of his lungs.  05/07/2020: Patient had GI bleed overnight, GI consulted, recommended  PPI drip, has shock liver with worsening transaminitis AST and ALT markedly elevated at 14,433 and 9,383, on is on dialysis, hypocalcemia, Patient also has elevated CK at 40,000, Ammonia 208, will start Rifaximin, lactulose, Vitamin K x 3 doses, and FFP 2 units, check INR today, start Midodrine 5mg for hypotension, patient developed afib with RVR, started Metoprolol  05/08/2020: Patient started on steroids, on levophed, vasopressin and Epinephrine, patient no longer has any brain stem reflexes however he would have to be rewarmed at target temperature 37 degrees celsius for 24 hours.  05/09/2020: Patient has been at target temperature for over 24 hrs

## 2020-05-05 NOTE — PLAN OF CARE
Outside Transfer Acceptance Note        Patients name/MRN:      Say Olivares, MRN: [id number]     Referring Physician or Mid-Level provider giving report:    Bubba Jain MD     Referral Facility: [facility]: [facility] ER     Date/Time of Acceptance:     5/5/20 at 2:30pm     Accepting Physician for admission to hospital: ELISE Magallanes MD ()     Accepting facility:     [facility]     Consulting Physicians from [facility] involved in case:    Dr. Marcellus Andrade MD, [facility]    Reason for transfer request:    Suspected overdose with cardiopulmonary arrest  COVID NEGATIVE     Mr. Olivares is a 40yo man with only chronic back pain as one of his major medical problems.  He was found down by his family at 6am snoring on the couch, by 7:30 am they realized he was unresponsive and called EMS.  He does have a history of opioid abuse.  A bystander performed CPR until EMS arrived and placed an Ambu® Spike LTS-D laryngeal tube.  He was in asystole when EMS got to the scene.    He was given Epi x 2 mg of Narcan.  He then went into ventricular fibrillation, had to be defibrillated x 1, then arrived in the ED in NSR.    He was hypotensive after arrival to lowest of 51/41, then gradually went to 87/66, then 117/58 by 1pm.  On exam his pupils were found to be fixed and dilated with agonal respirations.  He had track marks to right AC.  He was subsequently intubated in the ED after arrival.      Labs revealed elevated LA to 16, Cr of 2, K of 6.3.  His UDS was positive for amphetamines and cocaine.  COVID was negative.  CT chest showed debris/mass or secretion in the right proximal lower lobe bronchus along with intraluminal calcifications (?broncholiths).  Recommend bronchoscopy.  Right lower lobe atelectasis with shift of the mediastinum to the right.  Centrilobar emphysema with patchy right upper lobe pneumonia.  Smaller pneumonia in the left upper lobe along the fissure  and significant bibasilar infiltrates c/w multifocal pneumonia.      He was given Na Bicarb iv x 4, had NGT and roque placed, Kayexalate 30g, 10 U insulin and CaGluconate.  He was started on Levophed for the hypotension at 4 mamie/min.  He was given empiric Zosyn 4.5g iv x 1 and Zyvox 500mg iv x 1 after blood cultures.  His repeat K after above therapies was 5.7.    He has been cooled, with current temp of 87.2 F, rectally.      To Do List upon arrival:    Consult Neurology for possible anoxic brain injury  COVID precautions until second negative test given PNA on CT  Has a right midline in place  Has NGT in place  Has Roque in place     Vital signs:  97.2F R, /59, P 76, RR 20     LABS: 8:58AM  WBC 10.6, HG 12.1, HCT 42.1, , S41, L 44, M11  , K6.4, , CO3 16, BUN 22, CR 2, CA 8.7, AG 25  TB 6.2, ALB 3, TB 0.62, , ,   TROP 0.239,   LA 16 à 11  UA: >900WBC, 12 RBC, YEASE, 11 CASTS, MOD BLOOD  TYLENOL LEVEL <2  ALICYLATE 2.4  UDS: POSTIVE AMPHETAMINES, POSITIVE COCAINE  ETOH <3    LABS: 10:26AM  , K 7.3, , CO2 18, GLUC 107, CR 1.8    LABS: 12:03PM  , K 7, , CO2 16, GLUC 69, BUN 20, CR 1.7, CA 6.4    ABG 8AM: AC RATE 18, PEEP 5, , FIO2 100   pH <6.96, CO2 123, PAO2 114, O2 SAT 95%  ABG 12:43PM: AC RATE 28, PEEP 5, , FIO2 100   pH 6.99, CO2 65, PAO2 82, CO3 16, O2 SAT 89%  ABG 1:06PM: AC RATE 28, PEEP 5, , FIO2 100   pH 7.042, PCO2 63, PAO2 46, CO3 17, O2 SAT 71%     Imaging:  CXR: ET tube in place with diffuse interstitial lung infiltrates greater on the right.  Question of mucous or filling defect in the right mainstem bronchus. Right lung is less inflated than the left  CT head NC: No acute intracranial abnormalities  CT chest without: Debris/mass or secretion in the right proximal lower lobe bronchus along with intraluminal calcifications (?broncholiths).  Recommend bronchoscopy.  Right lower lobe atelectasis with shift of the  mediastinum to the right.  Centrilobar emphysema with patchy right upper lobe pneumonia.  Smaller pneumonia in the left upper lobe along the fissure and significant bibasilar infiltrates c/w multifocal pneumonia.  Minimal ascites.  CT abdomen and Pelvis without: Mild prominence of the small bowel loops suggesting probable ileus with minimal ascites.  Bibasilar infiltrates and right lower lobe atelectasis.  Soft tissue mass, likely splenules in the left.    ELISE Magallanes MD  Department of Hospital Medicine  [facility] Flow [facility]/   [phone]

## 2020-05-05 NOTE — NURSING
Patient arrived to [facility] < [facility]    Type of stroke/diagnosis: cardiac arrest    TPA start and end time n/a    Thrombectomy start and end time n/a    Current symptoms: unresponsive    Skin assessment done: Yes  Wounds noted: scabs to bilateral lower extremities, track osborne  TARUN Armband Applied: yes    NCC notified: Dr Raymond MD

## 2020-05-06 NOTE — ASSESSMENT & PLAN NOTE
Intubated    Vent Mode: A/C  Oxygen Concentration (%):  [] 50  Resp Rate Total:  [28 br/min-30 br/min] 28 br/min  Vt Set:  [500 mL] 500 mL  PEEP/CPAP:  [12 cmH20] 12 cmH20  Mean Airway Pressure:  [19 bfQ28-59 cmH20] 19 cmH20

## 2020-05-06 NOTE — SUBJECTIVE & OBJECTIVE
Admit Date: 5/5/2020  LOS: 1    CC: Overdose    Code Status: Full Code     SUBJECTIVE:     Interval History/Significant Events: Overnight, patient's ABG revealing severe acidosis, low bicarbonate, large base deficit. Hyperkalemia worsening to 6.6 with EKG changes. Patient received   Calcium Chloride administered (2g)  D50 given with 10 Units of Regular Insulin and 6 amps of Sodium Bicarbonate given.      Patient also developed Ventricular Tachycardia overnight which responded to Lidocaine 200 mg.      His repeat ABG with marked improvement and patient was started on Dialysis.    Review of Symptoms: ROS unable to be completed, patient unresponsive, intubated    Constitutional: Negative for fevers or chills.  Pulmonary:intubated   Cardiology: Negative for chest pain or palpitations.  GI: Negative for abdominal pain or constipation.  Neurologic: Negative for new weakness, headaches, or paresthesias.     Medications:  Continuous Infusions:   sodium chloride 0.9% 200 mL/hr at 05/06/20 0505    sodium chloride 0.9% 100 mL/hr at 05/06/20 0705    norepinephrine bitartrate-D5W 0.14 mcg/kg/min (05/06/20 0705)    vasopressin (PITRESSIN) infusion 0.04 Units/min (05/06/20 0730)     Scheduled Meds:   ampicillin-sulbactim (UNASYN) IVPB  1.5 g Intravenous Q6H    calcium chloride IVPB  2 g Intravenous Once    chlorhexidine  15 mL Mouth/Throat BID    Dextrose 10% Bolus  25 g Intravenous Once    Dextrose 10% Bolus  25 g Intravenous Once    Dextrose 10% Bolus  25 g Intravenous Once    famotidine  20 mg Per NG tube BID    heparin (porcine)  5,000 Units Subcutaneous Q8H    lidocaine HCL 20 mg/ml (2%)  200 mg Other Once    senna-docusate 8.6-50 mg  1 tablet Per OG tube Daily    sodium chloride 0.9%  500 mL Intravenous Once     PRN Meds:.acetaminophen, Dextrose 10% Bolus, Dextrose 10% Bolus, Dextrose 10% Bolus **AND** Dextrose 10% Bolus **AND** [COMPLETED] insulin regular, glucagon (human recombinant), insulin aspart U-100,  magnesium sulfate IVPB, ondansetron, sodium phosphate IVPB, sodium phosphate IVPB, sodium phosphate IVPB    OBJECTIVE:   Vital Signs (Most Recent):   Temp: (!) 91.6 °F (33.1 °C) (05/06/20 0705)  Pulse: 63 (05/06/20 0705)  Resp: 16 (05/06/20 0705)  BP: 129/68 (05/06/20 0605)  SpO2: 99 % (05/06/20 0705)    Vital Signs (24h Range):   Temp:  [89.8 °F (32.1 °C)-92.8 °F (33.8 °C)] 91.6 °F (33.1 °C)  Pulse:  [62-94] 63  Resp:  [10-30] 16  SpO2:  [86 %-100 %] 99 %  BP: ()/() 129/68  Arterial Line BP: ()/(55-77) 100/64    ICP/CPP (Last 24h):        I & O (Last 24h):     Intake/Output Summary (Last 24 hours) at 5/6/2020 0757  Last data filed at 5/6/2020 0705  Gross per 24 hour   Intake 3496.44 ml   Output 1301 ml   Net 2195.44 ml     Physical Exam:  GA: intubated comfortable, no acute distress.   HEENT: No scleral icterus or JVD.   Pulmonary: Intubated Clear to auscultation A/P/L. No wheezing, crackles, or rhonchi.  Cardiac: RRR S1 & S2 w/o rubs/murmurs/gallops.   Abdominal: Bowel sounds present x 4. No appreciable hepatosplenomegaly.  Skin: No jaundice, rashes, or visible lesions.  Neuro:  --GCS: E1 VT M1  --Mental Status:  Unresponsive  --CN II-XII grossly intact.   --Pupils 3 mm, PERRL.   --Corneal reflex, gag, cough intact.  --LUE strength: 1/5  --RUE strength: 1/5  --LLE strength: 1/5  --RLE strength: 1/5    Vent Data:   Vent Mode: A/C  Oxygen Concentration (%):  [] 50  Resp Rate Total:  [28 br/min-30 br/min] 28 br/min  Vt Set:  [500 mL] 500 mL  PEEP/CPAP:  [5 lrJ32-69 cmH20] 12 cmH20  Mean Airway Pressure:  [12 giK00-49 cmH20] 19 cmH20    Lines/Drains/Airway:        Airway - Non-Surgical Endotracheal Tube (Active)   Secured at 22 cm 5/6/2020  5:10 AM   Measured At Lips 5/6/2020  5:10 AM   Secured Location Left 5/6/2020  5:10 AM   Secured by Commercial tube macias 5/6/2020  5:10 AM   Bite Block none 5/6/2020  5:10 AM   Site Condition Cool;Dry 5/6/2020  5:10 AM   Status Intact;Secured;Patent  5/6/2020  5:10 AM   Site Assessment Clean;Dry;No bleeding;No drainage 5/6/2020  5:10 AM   Cuff Pressure 24 cm H2O 5/6/2020  5:10 AM      Percutaneous Central Line Insertion/Assessment - Quad Lumen  05/05/20 1845 right femoral vein (Active)   Verification by X-ray Yes 5/5/2020  7:05 PM   Site Assessment No drainage;No redness;No swelling;No warmth 5/5/2020  7:05 PM   Line Securement Device Secured with sutures 5/5/2020  7:05 PM   Dressing Type Biopatch in place;Central line dressing with pants 5/5/2020  7:05 PM   Dressing Status Clean;Dry;Intact 5/5/2020  7:05 PM   Dressing Intervention Integrity maintained 5/5/2020  7:05 PM   Date on Dressing 05/05/20 5/5/2020  7:05 PM   Dressing Due to be Changed 05/12/20 5/5/2020  7:05 PM   Distal Patency/Care blood return present;infusing 5/5/2020  7:05 PM   Medial 1 Patency/Care blood return present;normal saline lock 5/5/2020  7:05 PM   Medial 2 Patency/Care blood return present;normal saline lock 5/5/2020  7:05 PM   Proximal 1 Patency/Care blood return present;infusing 5/5/2020  7:05 PM   Line Necessity Review Hemodynamic instability 5/5/2020  7:05 PM       Trialysis (Dialysis) Catheter 05/05/20 2143 left femoral (Active)           Arterial Line 05/05/20 2340 Left Brachial (Active)           NG/OG Tube 05/05/20 1200 Right nostril (Active)   Placement Check placement verified by aspirate characteristics 5/5/2020  7:05 PM   Tolerance no signs/symptoms of discomfort 5/5/2020  7:05 PM   Securement secured to nostril center 5/5/2020  7:05 PM   Clamp Status/Tolerance clamped;no emesis;no nausea;no residual;no restlessness 5/5/2020  7:05 PM   Suction Setting/Drainage Method suction at the bedside 5/5/2020  7:05 PM   Insertion Site Appearance no redness, warmth, tenderness, skin breakdown, drainage 5/5/2020  7:05 PM            Urethral Catheter 05/05/20 1751 Temperature probe (Active)   Site Assessment Clean;Intact 5/5/2020  7:05 PM   Collection Container Urimeter 5/5/2020  7:05 PM    Securement Method secured to top of thigh w/ adhesive device 5/5/2020  7:05 PM   Catheter Care Performed yes 5/5/2020  7:05 PM   Reason for Continuing Urinary Catheterization Critically ill in ICU requiring intensive monitoring 5/5/2020  7:05 PM   Output (mL) 15 mL 5/6/2020  7:05 AM       Trialysis (Dialysis) Catheter 05/05/20 2143 left femoral (Active)     Nutrition/Tube Feeds (if NPO state why): TF     Labs:  ABG:   Recent Labs   Lab 05/06/20  0441   PH 7.237*   PO2 283*   PCO2 57.8*   HCO3 24.6   POCSATURATED 100   BE -3     BMP:  Recent Labs   Lab 05/06/20  0309     138   K 4.9  4.9     104   CO2 20*  20*   BUN 24*  24*   CREATININE 2.2*  2.2*   GLU 83  83   MG 1.9  1.9   PHOS 5.4*  5.4*     LFT:   Lab Results   Component Value Date    AST 6,628 (H) 05/06/2020    ALT 4,129 (H) 05/06/2020    ALKPHOS 94 05/06/2020    BILITOT 1.9 (H) 05/06/2020    ALBUMIN 2.3 (L) 05/06/2020    ALBUMIN 2.3 (L) 05/06/2020    PROT 4.6 (L) 05/06/2020     CBC:   Lab Results   Component Value Date    WBC 3.95 05/06/2020    HGB 12.8 (L) 05/06/2020    HCT 40.8 05/06/2020    MCV 97 05/06/2020     05/06/2020     Microbiology x 7d:   Microbiology Results (last 7 days)     Procedure Component Value Units Date/Time    Blood culture [[id number]] Collected:  05/05/20 1726    Order Status:  Completed Specimen:  Blood from Peripheral, Upper Arm, Left Updated:  05/06/20 0115     Blood Culture, Routine No Growth to date    Blood culture [[id number]] Collected:  05/05/20 1715    Order Status:  Completed Specimen:  Blood from Peripheral, Wrist, Right Updated:  05/06/20 0115     Blood Culture, Routine No Growth to date        Imaging: CXR   ET tube, enteric tube and esophageal probe appear unchanged.  Wires and tubing overlie the chest.    Interval placement of central catheter from an inferior approach with its tip overlying the mid right atrium.    Heart and lungs  appear unchanged when allowing for differences in technique  and positioning.      Impression       Interval placement of central catheter from an inferior approach with its tip overlying the mid right atrium.         I personally reviewed the above image.    ASSESSMENT/PLAN:     Active Hospital Problems    Diagnosis    *Overdose    Hypotension    Lactic acidosis    Troponin level elevated    Transaminitis    Cardiac arrest - out of hospital    MARIA DEL ROSARIO (acute kidney injury)    Rhabdomyolysis    Shock    Acute respiratory failure with hypoxia and hypercapnia    ATN (acute tubular necrosis)    Hyperkalemia        Uninterrupted Critical Care/Counseling Time (not including procedures): 33 mins

## 2020-05-06 NOTE — PROCEDURES
"Say Olivares is a 39 y.o. male patient.    Temp: (!) 90 °F (32.2 °C) (05/05/20 1800)  Pulse: 80 (05/05/20 1800)  Resp: (!) 28 (05/05/20 1800)  BP: (!) 124/90 (05/05/20 1800)  SpO2: (!) 94 % (05/05/20 1800)  Weight: 89.3 kg (196 lb 13.9 oz) (05/05/20 1650)       Central Line  Date/Time: 5/5/2020 7:25 PM  Performed by: Vladimir Rubin MD  Time out: Immediately prior to procedure a "time out" was called to verify the correct patient, procedure, equipment, support staff and site/side marked as required.  Indications: med administration, hemodialysis, vascular access and hemodynamic monitoring  Anesthesia: see MAR for details  Preparation: skin prepped with ChloraPrep  Skin prep agent dried: skin prep agent completely dried prior to procedure  Sterile barriers: all five maximum sterile barriers used - cap, mask, sterile gown, sterile gloves, and large sterile sheet  Hand hygiene: hand hygiene performed prior to central venous catheter insertion  Location details: right femoral  Site selection rationale: Hemodialysis access (Dialysis access)  Catheter type: Cordis  Catheter size: 9 Fr  Ultrasound guidance: no  Manometry: Yes  Number of attempts: 1  Assessment: successful placement  Technical procedures used: N/A  Significant surgical tasks conducted by the assistant(s): N/A  Complications: none  Estimated blood loss (mL): 1  Specimens: No  Implants: No  Post-procedure: line sutured,  blood return through all ports,  chlorhexidine patch and sterile dressing applied  Complications: No          Vladimir Rubin  5/5/2020  "

## 2020-05-06 NOTE — NURSING
Manometry for Central Line Procedure     Manometry Performed: yes    Manometry performed by: Dr. Andrade

## 2020-05-06 NOTE — HPI
History gathered from medical record:  38 y/o man with PMH of opioid abuse transferred to [facility] on 5/5/2020 for higher level of care.  Originally presented to [facility] for possible cocaine and amphetamine overdose.  Patient has a hx of chronic back pain and was found down by his family at 6am 5/5/2020 snoring on the couch, and by 7:30 am his family realized he was unresponsive thus called EMS.  CPR was performed by a bystander until EMS arrived and placed an Ambu® Spike LTS-D laryngeal tube.   Patient was in asystole when EMS got to the scene and was given given Epi and 2 mg of Narcan.  Patient then went into ventricular fibrillation, and was defibrillated x 1, and arrived in the ED in NSR.  Per transfer note, patient had been hypotensive after arrival 50/40s, gradually increased to 117/58 by 1pm.  Labs on arrival revealed lactic acid 16, sCr 2, K 6.3, with UDS positive amphetamine and cocaine.  COVID negative at OSH.  Was given NaBicarb IV x 4, NGT and roque placed, Kayaxelate 30 g given, and shifted with 10 units insulin and CaGluc.  Started on Levophed, empiric antibiotics, and transferred to [facility].      Nephrology consulted for Taya, rhabdomyolysis, and evaluation for RRt.

## 2020-05-06 NOTE — ASSESSMENT & PLAN NOTE
IVF bolus N/S 500cc  Continue with IVF  Hypotension not responsive to fluids due to hypothermia  Evaluated by Ultrasound of IVC

## 2020-05-06 NOTE — PROCEDURES
Say Olivares is a 39 y.o. male patient.    Temp: (!) 90.9 °F (32.7 °C) (05/05/20 2035)  Pulse: 79 (05/05/20 2035)  Resp: 10 (05/05/20 2035)  BP: 102/70 (05/05/20 2035)  SpO2: 96 % (05/05/20 2035)  Weight: 89.3 kg (196 lb 13.9 oz) (05/05/20 1650)       Central Line  Date/Time: 5/5/2020 9:31 PM  Location procedure was performed: [facility] NEURO CRITICAL [facility]  Performed by: Marcellus Andrade MD  Consent Done: Emergent Situation  Indications: hemodialysis  Anesthesia: see MAR for details  Preparation: skin prepped with ChloraPrep  Location details: right internal jugular  Catheter type: trialysis  Catheter size: 9 Fr  Ultrasound guidance: yes  Vessel Caliber: medium, compressibility normal  Vascular Doppler: not done  Needle advanced into vessel with real time Ultrasound guidance.  Guidewire confirmed in vessel.  Sterile sheath used.  Manometry: Yes  Number of attempts: 3  Comments: Unable to advance guidewire without meeting resistance. Procedure aborted.           Marcellus Andrade  5/5/2020

## 2020-05-06 NOTE — PLAN OF CARE
Due to COVID 19 restrictions limiting patient contact and visits, Discharge Planning Assessment completed via phone with patient's mother, Merced Olivares, [phone].    Patient is intubated on the vent and unable to answer questions.  Per mother, the patient lives with parents and 16 year old son in a single story house with 5 step(s) to enter.   Per mother, the patient was independent with ADLS and used a straight cane for ambulation prn and a back brace prn.  Patient will have assistance from parents and son upon discharge.   All questions addressed.  CM will follow for needs.         05/06/20 1143   Discharge Assessment   Assessment Type Discharge Planning Assessment   Confirmed/corrected address and phone number on facesheet? Yes   Assessment information obtained from? Caregiver  (mother, Merced Olivares)   Expected Length of Stay (days) 7   Communicated expected length of stay with patient/caregiver yes   Prior to hospitilization cognitive status: Alert/Oriented   Prior to hospitalization functional status: Independent   Current cognitive status: Coma/Sedated/Intubated   Current Functional Status: Completely Dependent   Facility Arrived From: [facility]   Lives With child(betsey), dependent;parent(s)  (Parents and 16 year old son)   Able to Return to Prior Arrangements yes   Is patient able to care for self after discharge? Unable to determine at this time (comments)   Who are your caregiver(s) and their phone number(s)? Merced Olivares (Mother)  [phone], Say Olivares  (Father)  [phone]   Patient's perception of discharge disposition other (comments)  (jean)   Readmission Within the Last 30 Days no previous admission in last 30 days   Patient currently being followed by outpatient case management? No   Patient currently receives any other outside agency services? No   Equipment Currently Used at Home cane, straight;other (see comments)  (back brace)   Do you have any problems affording any of your  prescribed medications? No   Is the patient taking medications as prescribed? no   Does the patient have transportation home? Yes   Transportation Anticipated family or friend will provide   Does the patient receive services at the [facility]? No   Discharge Plan A Long-term acute care facility (LTAC)   Discharge Plan B Other  (tbd)   DME Needed Upon Discharge  other (see comments)  (tbd)   Patient/Family in Agreement with Plan unable to assess              PCP:  Primary Doctor None        Pharmacy:    [facility] [address] [facility].  [address] [place] [address].  [facility] [id number]  Phone: [phone] Fax: [phone]        Emergency Contacts:  Extended Emergency Contact Information  Primary Emergency Contact: Merced Olivares  Address: [address] [place] [address] [place]  Mobile Phone: [phone]  Relation: Mother  Secondary Emergency Contact: Sr Olivares Thomas  Address: [address] [place] [address] [place]  Mobile Phone: [phone]  Relation: Father      Insurance:    Payor: MEDICAID / Plan: [facility] COMMUNITY PLAN [email or web address] ([place] MEDICAID) / Product Type: Managed Medicaid /     Jackie Hope RN, CCRN-K, CCM  Neuro-Critical Care   X [id number]    05/06/2020  11:47 AM

## 2020-05-06 NOTE — PLAN OF CARE
POC reviewed with pt's mother, Merced, via telephone at 1000. Pt's mother verbalized understanding. Questions and concerns addressed. No acute events today. CRRT.  Continuous EEG maintained.  Levophed and vasopressin titrated as appropriate to keep MAP>65.  TTM via Zoll quad catheter, maintained at goal temp of 33 degrees C.  Pt progressing toward goals. Will continue to monitor. See flowsheets for full assessment and VS info.

## 2020-05-06 NOTE — PLAN OF CARE
05/06/20 0824   Post-Acute Status   Post-Acute Authorization Placement   Post-Acute Placement Status Awaiting Internal Medical Clearance  (vent)     Naty Moreno LCSW  Neurocritical Care   [facility]  [id number]

## 2020-05-06 NOTE — PROGRESS NOTES
[facility] SLED NOTE    Say Olivares is a 39 y.o. male currently on SLED for removal of uremic toxins and volume.     Patient seen and evaluated on SLED, tolerating treatment, see CRRT flowsheet for vitals and assessments.    Labs have been reviewed and the dialysate bath has been adjusted.    Labs:      Recent Labs   Lab 05/05/20  1714 05/05/20 2008 05/05/20  2150 05/06/20  0309    138 129* 138  138   K 4.9 5.4* 6.6* 4.9  4.9    106 101 104  104   CO2 15* 17* 16* 20*  20*   BUN 21* 23* 24* 24*  24*   CREATININE 1.9* 2.2* 2.1* 2.2*  2.2*   CALCIUM 6.2* 6.0* 5.4* 7.3*  7.3*   PHOS 9.3*  --  7.3* 5.4*  5.4*       Recent Labs   Lab 05/05/20  1714 05/06/20  0309   WBC 9.39 3.95   HGB 14.0 12.8*   HCT 46.7 40.8    240        Assessment/Plan  - Seen on SLED this morning, tolerating session with current UFR, no complications. Treatment started around 0300, tolerating a UFR of 200 mL/hr since then. Pressor requirements improving.   - Will continue SLED for volume management and metabolic clearance. Will increased UFR to 300 mL/hr from volume mangement. Patient taking in about 138 mL/hr per continues drips.   - SLED prescription and dialysate baths were reviewed and changes made according to most recent labs.    - Follow labs serially while on SLED to monitor electrolytes and follow replacement protocol as needed.   - ABG significant for a respiratory acidosis, defer to primary team for ventilator adjustments.   - On mechanical ventilator, FiO2 50%.   - Patient hemodynamically not stable for intermittent HD yet.  - Continue to monitor intake and output, daily weights   - Avoid nephrotoxic medication and renal dose medications to GFR    BESSY Guthrie, GEORGES, APRN, FNP-C  Department of Nephrology  [facility]  Pager: [phone]

## 2020-05-06 NOTE — H&P
[facility]  Neurocritical Care  History & Physical    Admit Date: 5/5/2020  Service Date: 05/05/2020  Length of Stay: 0    Subjective:     Chief Complaint: Overdose    History of Present Illness: Mr. Olivares is a 40 yo man with PMH of opioid abuse admitted as a transfer from [facility] for possible cocaine and amphetamine overdose. Patient has a hx of chronic back pain and was found down by his family at 6am this morning snoring on the couch, and when by 7:30 am his family realized he was unresponsive they called EMS.   CPR was performed by a bystander until EMS arrived and placed an Ambu® Spike LTS-D laryngeal tube.   Patient was in asystole when EMS got to the scene and was given given Epi  and 2 mg of Narcan. Patient then went into ventricular fibrillation, and was defibrillated x 1, and arrived in the ED in NSR.     Per transfer note, Patient was hypotensive after arrival to lowest of 51/41, then gradually went to 87/66, then 117/58 by 1pm.   Patient's pupils were found to be fixed and dilated with agonal respirations.  He had track marks to right AC.  He was subsequently intubated in the ED after arrival.   Patient was cooled for less than 12 hours prior to arrival, will continue for 24hrs     Upon Labs revealed elevated LA to 16, Cr of 2, K of 6.3.  His UDS was positive for amphetamines and cocaine.  COVID was negative at [facility], repeat COVID test pending.  CT chest showed debris/mass or secretion in the right proximal lower lobe bronchus along with intraluminal calcifications (?broncholiths).  Recommend bronchoscopy.  Right lower lobe atelectasis with shift of the mediastinum to the right.  Centrilobar emphysema with patchy right upper lobe pneumonia.  Smaller pneumonia in the left upper lobe along the fissure and significant bibasilar infiltrates c/w multifocal pneumonia.       He was given Na Bicarb iv x 4, had NGT and roque placed, Kayexalate 30g, 10 U insulin and CaGluconate.  He was  started on Levophed for the hypotension at 4 mamie/min.  He was given empiric Zosyn 4.5g iv x 1 and Zyvox 500mg iv x 1 after blood cultures.  His repeat K after above therapies was 5.7.     Patient was transferred to [facility] for higher level of care, Video EEG monitoring began, A-line and Femoral line placed. Will continue current cooling mgt.    No past medical history on file.  No past surgical history on file.   No current facility-administered medications on file prior to encounter.      No current outpatient medications on file prior to encounter.      Allergies: Patient has no known allergies.    No family history on file.  Social History     Tobacco Use    Smoking status: Not on file   Substance Use Topics    Alcohol use: Not on file    Drug use: Not on file     Review of Systems   Constitutional: Negative for chills, fever and unexpected weight change.   HENT: Negative for ear pain, nosebleeds and sore throat.    Respiratory: Negative for cough, shortness of breath and wheezing.         INTUBATED   Cardiovascular: Negative for chest pain, palpitations and leg swelling.   Gastrointestinal: Negative for abdominal pain, constipation, diarrhea, nausea and vomiting.   Genitourinary: Negative for difficulty urinating, frequency and urgency.   Musculoskeletal: Negative for back pain, joint swelling and neck pain.   Skin: Negative for pallor.   Allergic/Immunologic: Negative for food allergies.   Neurological: Negative for dizziness, tremors, seizures, syncope, speech difficulty, weakness, numbness and headaches.        COMATOSE   Psychiatric/Behavioral: Negative for behavioral problems and sleep disturbance.       Objective:     Vitals:    Temp: (!) 89.8 °F (32.1 °C)  Pulse: 78  BP: (!) 154/106  MAP (mmHg): 122  Resp: (!) 29  SpO2: (can not obtain )  Oxygen Concentration (%): 100  O2 Device (Oxygen Therapy): ventilator  Vent Mode: A/C  Set Rate: 28 BPM  Vt Set: 500 mL  PEEP/CPAP: 5 cmH20  Peak Airway  Pressure: 19 cmH2O  Mean Airway Pressure: 12 cmH20  Plateau Pressure: 0 cmH20    Temp  Min: 89.8 °F (32.1 °C)  Max: 92.8 °F (33.8 °C)  Pulse  Min: 75  Max: 80  BP  Min: 119/74  Max: 154/106  MAP (mmHg)  Min: 122  Max: 122  Resp  Min: 28  Max: 29  SpO2  Min: 91 %  Max: 100 %  Oxygen Concentration (%)  Min: 100  Max: 100    No intake/output data recorded.           Physical Exam   Constitutional: He appears well-developed and well-nourished. He is intubated.   HENT:   Head: Normocephalic and atraumatic.   Eyes: Pupils are equal, round, and reactive to light. Conjunctivae and EOM are normal.   Neck: Normal range of motion. Neck supple.   Cardiovascular: Normal rate, regular rhythm, normal heart sounds and intact distal pulses. Exam reveals no gallop and no friction rub.   No murmur heard.  Pulmonary/Chest: Effort normal and breath sounds normal. No stridor. He is intubated. No respiratory distress. He has no wheezes. He has no rales. He exhibits no tenderness.   Abdominal: Soft. Bowel sounds are normal. He exhibits no distension and no mass. There is no tenderness. There is no rebound and no guarding. No hernia.   Musculoskeletal: Normal range of motion.   Neurological: He is unresponsive. He displays no atrophy, no tremor and normal reflexes. No cranial nerve deficit or sensory deficit. He exhibits normal muscle tone. He displays no seizure activity. Gait normal. GCS eye subscore is 1. GCS verbal subscore is 1. GCS motor subscore is 1.   Reflex Scores:       Tricep reflexes are 2+ on the right side and 2+ on the left side.       Bicep reflexes are 2+ on the right side and 2+ on the left side.       Brachioradialis reflexes are 2+ on the right side and 2+ on the left side.       Patellar reflexes are 2+ on the right side and 2+ on the left side.       Achilles reflexes are 2+ on the right side and 2+ on the left side.  Skin: Skin is warm and dry. Capillary refill takes less than 2 seconds.   Psychiatric: He has a  normal mood and affect. His behavior is normal. Judgment and thought content normal.        Unable to test orientation, language, memory, judgment, insight, fund of knowledge, hearing, shoulder shrug, tongue protrusion, coordination, gait due to level of consciousness.    Today I personally reviewed pertinent medications, lines/drains/airways, imaging, cardiology results, laboratory results, notably:    Chest Xray 05/05/20  Endotracheal tube at the level of the clavicular heads.  Enteric tube in place with tip below the diaphragm out of field of view.    Right basilar opacity obscuring the diaphragm and right heart with some volume loss as well as scattered air bronchograms and bandlike opacities likely representing combination of atelectasis/infiltrate including aspiration or pneumonia.  There is hazy opacification with diffuse nonspecific interstitial coarsening of the aerated right mid to upper lung which could reflect pulmonary edema or nonspecific interstitial pneumonia.  There is nonspecific thickening of the right paratracheal stripe.  There is suspected right pleural effusion extending to the apex, likely moderate in volume.  Left hemithorax is well expanded and grossly clear.  No pneumothorax.  No acute osseous process seen.      Impression       As above.         Assessment/Plan:     Cardiac/Vascular  Hypotension  IVF bolus N/S 500cc  Continue with IVF  Hypotension not responsive to fluids due to hypothermia  Evaluated by Ultrasound of IVC    Other  * Overdose  39 year old man found down this morning by family s/p CPR transferred after cooling process begun 12 hours ago, Utox positive for Cocaine and Amphetamines,     Neuro:    Seizures/Epilepsy Monitoring Evaluation:  - Continue current management.  - Seizure Precautions  - Continue vEEG monitoring   -- CT Head pending  -- SBP goal MAP >65  --PT/OT/Speech    Pulmonary:   Acute Respiratory Failure   -- Daily CXR  -- Daily ABGs --Possible aspiration  pneumonia    Vent Mode: A/C  Oxygen Concentration (%):  [100] 100  Resp Rate Total:  [28 br/min] 28 br/min  Vt Set:  [500 mL] 500 mL  PEEP/CPAP:  [5 cmH20] 5 cmH20  Mean Airway Pressure:  [12 cmH20] 12 cmH20    Cardiac:   Hypotension  -- Continue to monitor HR and BP   --MAP goal >65  -- 2D echo pending  -- Started on levophed, arterial line and central line placed 05/05/2020      Renal:   --Continue to monitor I/O  --Continue to monitor BUN/Cr  -- BUN 21; Creatinine 1.9  -- PO4 elevated at 9.1 consider Sevalamer  -- follow labs CMP q12    ID:   -- hypothermmia cooling to 34 degrees C   -- No leukocytosis   -- Unasyn started Q6hrs    Hem/Onc:   --continue to monitor H/H       Endocrine: A1C 5.6  --Continue to monitor BG  -- Continue sliding scale  -- POCT q 6      Fluids/Electrolytes/Nutrition/GI:   - Start TF  -- Continue to monitor and replace electrolytes accordingly    PPX   -PUD: Famotidine 20 mg BID  -DVT: : heparin 5000 units q 8, TCD, SCD    Lactic acidosis  Continue to trend Lactic acid 7.1    Troponinemia  Continue to tremd    Transaminitis shock liver continue to trend AST 3280  ALT 2605 CMP q12hrs,     Uninterrupted Critical Care/Counseling Time (not including procedures):  >50 min    Vladimir Rubin MD  [facility]  [facility]    -           The patient is being Prophylaxed for:  Venous Thromboembolism with: Mechanical or Chemical  Stress Ulcer with: H2B  Ventilator Pneumonia with: chlorhexidine oral care    Activity Orders          None        Full Code    Vladimir Rubin MD  Neurocritical Care  [facility]

## 2020-05-06 NOTE — CARE UPDATE
Arterial Line Placed without complications  ABG revealing severe acidosis, low bicarbonate, large base deficit.   Hyperkalemia worsening to 6.6 and now with EKG changes.    Calcium Chloride administered (2g)  D50 given with 10 Units of Regular Insulin  6 amps of Sodium Bicarbonate given.     Patient developed Ventricular Tachycardia which responded to Lidocaine 200 mg.     Repeat ABG with marked improvement  Nephrology and Dialysis RN notified of urgency of patient status.     Will continue to follow.    Tony Madrid  12:28 AM

## 2020-05-06 NOTE — PROCEDURES
[facility]     PRELIMINARY C-EEG REPORT:  Review: 5/6/220, 07:30 - 16:08     EEG done under hypothermia protocol:  Complete suppression is noted, with intermittent artifacts seen.  No epileptiform activity or electrographic seizures seen.    Full report to follow.  Will continue to monitor.     Thank you for involving us in the care of this patient.    Zulekya Lama MD, PATRICE(), FACNS, FAROHIT.  Neurology-Epilepsy.  [facility].

## 2020-05-06 NOTE — NURSING
Followed up with dialysis RN. States currently getting dialysis machine prepared and will be up shortly to initiate treatment. KIM Diaz notified. No new orders. Will continue to monitor.

## 2020-05-06 NOTE — CONSULTS
[facility]  Nephrology  Consult Note    Patient Name: Say Olivares  MRN: [id number]  Admission Date: 5/5/2020  Hospital Length of Stay: 0 days  Attending Provider: Marcellus Andrade MD   Primary Care Physician: Primary Doctor No  Principal Problem:Overdose    Inpatient consult to Nephrology  Consult performed by: Alvarado De Jesus MD  Consult ordered by: Tayler Diaz PA-C        Subjective:     HPI: History gathered from medical record:  38 y/o man with PMH of opioid abuse transferred to [facility] on 5/5/2020 for higher level of care.  Originally presented to [facility] for possible cocaine and amphetamine overdose.  Patient has a hx of chronic back pain and was found down by his family at 6am 5/5/2020 snoring on the couch, and by 7:30 am his family realized he was unresponsive thus called EMS.  CPR was performed by a bystander until EMS arrived and placed an Ambu® Spike LTS-D laryngeal tube.   Patient was in asystole when EMS got to the scene and was given given Epi and 2 mg of Narcan.  Patient then went into ventricular fibrillation, and was defibrillated x 1, and arrived in the ED in NSR.  Per transfer note, patient had been hypotensive after arrival 50/40s, gradually increased to 117/58 by 1pm.  Labs on arrival revealed lactic acid 16, sCr 2, K 6.3, with UDS positive amphetamine and cocaine.  COVID negative at OSH.  Was given NaBicarb IV x 4, NGT and roque placed, Kayaxelate 30 g given, and shifted with 10 units insulin and CaGluc.  Started on Levophed, empiric antibiotics, and transferred to [facility].      Nephrology consulted for Taya, rhabdomyolysis, and evaluation for RRt.    History reviewed. No pertinent past medical history.    History reviewed. No pertinent surgical history.    Review of patient's allergies indicates:  No Known Allergies  Current Facility-Administered Medications   Medication Frequency    0.9%  NaCl infusion Continuous    acetaminophen tablet 650 mg Q6H PRN     ampicillin-sulbactam (UNASYN) 1.5 g in sodium chloride 0.9% 100 mL IVPB Q6H    chlorhexidine 0.12 % solution 15 mL BID    dextrose 10% (D10W) Bolus PRN    dextrose 10% (D10W) Bolus PRN    famotidine tablet 20 mg BID    glucagon (human recombinant) injection 1 mg PRN    heparin (porcine) injection 5,000 Units Q8H    insulin aspart U-100 pen 1-10 Units Q6H PRN    norepinephrine 4 mg in dextrose 5% 250 mL infusion (premix) (titrating) Continuous    ondansetron injection 4 mg Q6H PRN    [START ON 5/6/2020] senna-docusate 8.6-50 mg per tablet 1 tablet Daily    sodium chloride 0.9% bolus 500 mL Once     Family History     None        Tobacco Use    Smoking status: Not on file   Substance and Sexual Activity    Alcohol use: Not on file    Drug use: Not on file    Sexual activity: Not on file     Review of Systems   Unable to perform ROS: Intubated     Objective:     Vital Signs (Most Recent):  Temp: (!) 90.9 °F (32.7 °C) (05/05/20 2035)  Pulse: 79 (05/05/20 2035)  Resp: 10 (05/05/20 2035)  BP: 102/70 (05/05/20 2035)  SpO2: 96 % (05/05/20 2035)  O2 Device (Oxygen Therapy): ventilator (05/05/20 2035) Vital Signs (24h Range):  Temp:  [89.8 °F (32.1 °C)-92.8 °F (33.8 °C)] 90.9 °F (32.7 °C)  Pulse:  [75-82] 79  Resp:  [10-29] 10  SpO2:  [86 %-100 %] 96 %  BP: ()/() 102/70  Arterial Line BP: ()/(64-77) 100/77     Weight: 89.3 kg (196 lb 13.9 oz) (05/05/20 1650)  There is no height or weight on file to calculate BMI.  There is no height or weight on file to calculate BSA.    I/O last 3 completed shifts:  In: 285.9 [I.V.:35.9; IV Piggyback:250]  Out: -     Physical Exam   Constitutional:   Critically ill   HENT:   Head: Normocephalic.   Neck: Neck supple.   Cardiovascular: Normal rate and regular rhythm.   Pulmonary/Chest:   Vent transmitted breath sounds   Abdominal: Soft. Bowel sounds are normal.   Musculoskeletal:   Track marks in upper extremities   Neurological:   Sedated   Nursing note  and vitals reviewed.      Significant Labs:  CBC:   Recent Labs   Lab 05/05/20  1714   WBC 9.39   RBC 4.64   HGB 14.0   HCT 46.7      *   MCH 30.2   MCHC 30.0*     CMP:   Recent Labs   Lab 05/05/20 2008   GLU 99   CALCIUM 6.0*   ALBUMIN 2.8*   PROT 5.5*      K 5.4*   CO2 17*      BUN 23*   CREATININE 2.2*   ALKPHOS 128   ALT 3,510*   AST 4,258*   BILITOT 1.5*     All labs within the past 24 hours have been reviewed.    Significant Imaging:  CXR personally reviewed.    Assessment/Plan:     * Overdose  Per primary team    TAYA (acute kidney injury)  Likely ischemic ATN from hypotension and cardiac arrest on 5/5/2020    Plan:  - Patient anuric despite mannitol administered  - Metabolic acidosis likely from shock causing lactic acidosis, as evidenced by elevated liver enzymes, Taya, and elevated troponins  - Will provide SLED treatment for metabolic clearance  - Strict I/Os and chart  - MAP >65  - Avoid nephrotoxic agents, NSAIDs, IV contrast, etc  - Renal US  - U/A, UPCr (when and if patient produces any urine)  - Renal function panel per RRt protocol  - Will continue to follow closely    Acute respiratory failure with hypoxia and hypercapnia  Per primary team    Rhabdomyolysis  Recommend increasing IVFs to 150 mL/hr      Cardiac arrest - out of hospital  Per primary team    Hypotension  On vasopressors by primary team        Thank you for your consult. I will follow-up with patient. Please contact us if you have any additional questions.    Alvarado De Jesus MD  Nephrology  [facility]

## 2020-05-06 NOTE — PROCEDURES
Say Olivares is a 39 y.o. male patient.    Temp: (!) 91.2 °F (32.9 °C) (05/05/20 2337)  Pulse: 73 (05/05/20 2337)  Resp: 11 (05/05/20 2337)  BP: (!) 84/63 (05/05/20 2337)  SpO2: 97 % (05/05/20 2337)  Weight: 89.3 kg (196 lb 13.9 oz) (05/05/20 1650)       Arterial Line  Date/Time: 5/6/2020 12:20 AM  Location procedure was performed: [facility] NEURO CRITICAL CARE  Performed by: Tony Madrid MD  Authorized by: Marcellus Andrade MD   Pre-op Diagnosis: CARDIAC ARREST  Post-operative diagnosis: CARDIAC ARREST  Consent Done: Yes  Consent: Written consent obtained.  Preparation: Patient was prepped and draped in the usual sterile fashion.  Indications: multiple ABGs, respiratory failure and hemodynamic monitoring  Location: left brachial  Patient sedated: no  Isidro's test normal: yes  Needle gauge: 20  Seldinger technique: Seldinger technique used  Number of attempts: 2  Complications: No  Specimens: No  Implants: No  Post-procedure: line sutured and dressing applied  Post-procedure CMS: normal          Tony Madrid  5/6/2020

## 2020-05-06 NOTE — PROGRESS NOTES
"Called Dialysis RN to determine ETA to hook pt up to dialysis. States cannot give RN ETA due to large amount of pts that need to be seen currently. States pt will be hooked up "some time tonight". NCC notified of delay. No new orders. Will continue to monitor.   "

## 2020-05-06 NOTE — PLAN OF CARE
Problem: Nutrition Impairment (Mechanical Ventilation, Invasive)  Goal: Optimal Nutrition Delivery  Outcome: Ongoing, Progressing  Intervention: Optimize Nutrition Delivery  Flowsheets (Taken 5/6/2020 1047)  Nutrition Support Management: weight trending reviewed; other (see comments)     Problem: Oral Intake Inadequate (Acute Kidney Injury/Impairment)  Goal: Optimal Nutrition Intake  Outcome: Ongoing, Not Progressing  Intervention: Promote and Optimize Nutrition  Flowsheets (Taken 5/6/2020 1047)  Oral Nutrition Promotion: other (see comments)     Recommendations     1.) Initiate enteral nutrition within 36hr: suggest Peptamen Intense VHP @ 20mL/hr advancing 10mL q4h to goal rate 60mL/hr to provide 1440 kcals, 132gm protein and 1210mL of free water.               MD to manage fluid.               If GRV >500mL, hold TF q4h then restart regimen.               TF to meet 94% EEN and 106% EPN.   2.) If able to extubate, ADAT to regular; texture per SLP.   3.) Suggest thiamine, folic acid, and MVI.   4.) Daily weights     Goals: 1.) Pt to receive nutrition by follow up.   Nutrition Goal Status: new  Communication of THAIS Recs: reviewed with RN

## 2020-05-06 NOTE — NURSING
KIM Diaz notified of rising Troponin of 12.419 up from 4.647. No new orders at this time. Will continue to monitor.

## 2020-05-06 NOTE — CONSULTS
[facility]  Adult Nutrition  Consult Note    SUMMARY     Recommendations    1.) Initiate enteral nutrition within 36hr: suggest Peptamen Intense VHP @ 20mL/hr advancing 10mL q4h to goal rate 60mL/hr to provide 1440 kcals, 132gm protein and 1210mL of free water.    MD to manage fluid.    If GRV >500mL, hold TF q4h then restart regimen.    TF to meet 94% EEN and 106% EPN.   2.) If able to extubate, ADAT to regular; texture per SLP.   3.) Suggest thiamine, folic acid, and MVI.   4.) Daily weights    Goals: 1.) Pt to receive nutrition by follow up.   Nutrition Goal Status: new  Communication of RD Recs: reviewed with RN    Reason for Assessment    Reason For Assessment: consult  Diagnosis: other (see comments)(overdose)  Relevant Medical History: substance abuse  Interdisciplinary Rounds: did not attend  General Information Comments: Pt currently intubated, no sedation, central line in place with SLED. No nutritional interventions at this time. No GI distress. No wt hx per chart. Pt does not meet malnutrition criteria at this time. Due to recent hospital wide restrictions to limit the transfer of (COVID-19), we are not performing any physical exams at this time. All S/S will be observational; NFPE to be performed at a future date.    Nutrition Discharge Planning: Unable to determine at this time.     Nutrition Risk Screen    Nutrition Risk Screen: other (see comments)(jean)    Nutrition/Diet History    Food Allergies: NKFA  Factors Affecting Nutritional Intake: NPO, on mechanical ventilation    Anthropometrics    Temp: (!) 91.6 °F (33.1 °C)  Height Method: Estimated  Height: 6' (182.9 cm)(Simultaneous filing. User may not have seen previous data.)  Height (inches): 72 in  Weight Method: Bed Scale  Weight: 89.3 kg (196 lb 13.9 oz)  Weight (lb): 196.87 lb  Ideal Body Weight (IBW), Male: 178 lb  % Ideal Body Weight, Male (lb): 110.6 %  BMI (Calculated): 26.7  BMI Grade: 25 - 29.9 - overweight        Lab/Procedures/Meds    Pertinent Labs Reviewed: reviewed  Pertinent Labs Comments: HbA1C 5.6, CO2 20, BUN 24, Cr 2.2, GFR 36.1, Ca 7.3, Phos 5.4, albumin 2.3, bili 1.9, AST 6628, ALT 4129  Pertinent Medications Reviewed: reviewed  Pertinent Medications Comments: D10, insulin, famotidine, mannitol, senna-docusate, pressor x2    Estimated/Assessed Needs    Weight Used For Calorie Calculations: 89.3 kg (196 lb 13.9 oz)  Energy Calorie Requirements (kcal): 1531  Energy Need Method: [facility]  Protein Requirements: 107-125(g/day)  Weight Used For Protein Calculations: 89.3 kg (196 lb 13.9 oz)(1.2-1.4 g/kg)     Estimated Fluid Requirement Method: RDA Method(or per MD)  RDA Method (mL): 1531         Nutrition Prescription Ordered    Current Diet Order: NPO    Evaluation of Received Nutrient/Fluid Intake    IV Fluid (mL): 2400  I/O: +2.3L since admit  Comments: LBM 5/6  % Intake of Estimated Energy Needs: 0 - 25 %  % Meal Intake: NPO    Nutrition Risk    Level of Risk/Frequency of Follow-up: high(x2/week)     Assessment and Plan  Nutrition Problem  Inadequate energy intake    Related to (etiology):   Current diet (NPO), mechanical ventilation     Signs and Symptoms (as evidenced by):   <75% of nutritional needs being met    Interventions(treatment strategy):  Collaboration of care with providers  Referral of care    Nutrition Diagnosis Status:   New         Monitor and Evaluation    Food and Nutrient Intake: energy intake, enteral nutrition intake  Food and Nutrient Adminstration: diet order, enteral and parenteral nutrition administration  Anthropometric Measurements: weight, weight change, body mass index  Biochemical Data, Medical Tests and Procedures: electrolyte and renal panel, gastrointestinal profile, glucose/endocrine profile, inflammatory profile, lipid profile  Nutrition-Focused Physical Findings: overall appearance     Malnutrition Assessment  Due to recent hospital wide restrictions to limit the transfer  of (COVID-19), we are not performing any physical exams at this time. All S/S will be observational; NFPE to be performed at a future date.    Nutrition Follow-Up    RD Follow-up?: Yes

## 2020-05-06 NOTE — NURSING
Manometry for Central Line Procedure     Manometry Performed: yes    Manometry performed by: ARCENIO Wade

## 2020-05-06 NOTE — NURSING
POC reviewed with family at 0300. Family verbalized understanding. Questions and concerns addressed. Levo titrated to sustain MAP >65. Vaso infusing. Target temperature management used to sustain 33 degrees Celsius. Pt had minimal urine output. Dialysis started  Continuous EEG in place. Pt tolerating ventilator well. Acute events in previous notes. Will continue to monitor. See flowsheets for full assessment and VS info

## 2020-05-06 NOTE — PROGRESS NOTES
[facility]  Neurocritical Care  Progress Note    Admit Date: 5/5/2020  Service Date: 05/06/2020  Length of Stay: 1    Subjective:     Chief Complaint: Overdose    History of Present Illness: Mr. Olivares is a 38 yo man with PMH of opioid abuse admitted as a transfer from [facility] for possible cocaine and amphetamine overdose. Patient has a hx of chronic back pain and was found down by his family at 6am this morning snoring on the couch, and when by 7:30 am his family realized he was unresponsive they called EMS.   CPR was performed by a bystander until EMS arrived and placed an Ambu® Spike LTS-D laryngeal tube.   Patient was in asystole when EMS got to the scene and was given given Epi  and 2 mg of Narcan. Patient then went into ventricular fibrillation, and was defibrillated x 1, and arrived in the ED in NSR.     Per transfer note, Patient was hypotensive after arrival to lowest of 51/41, then gradually went to 87/66, then 117/58 by 1pm.   Patient's pupils were found to be fixed and dilated with agonal respirations.  He had track marks to right AC.  He was subsequently intubated in the ED after arrival.   Patient was cooled for less than 12 hours prior to arrival, will continue for 24hrs     Upon Labs revealed elevated LA to 16, Cr of 2, K of 6.3.  His UDS was positive for amphetamines and cocaine.  COVID was negative at [facility], repeat COVID test pending.  CT chest showed debris/mass or secretion in the right proximal lower lobe bronchus along with intraluminal calcifications (?broncholiths).  Recommend bronchoscopy.  Right lower lobe atelectasis with shift of the mediastinum to the right.  Centrilobar emphysema with patchy right upper lobe pneumonia.  Smaller pneumonia in the left upper lobe along the fissure and significant bibasilar infiltrates c/w multifocal pneumonia.       He was given Na Bicarb iv x 4, had NGT and roque placed, Kayexalate 30g, 10 U insulin and CaGluconate.  He was  started on Levophed for the hypotension at 4 mamie/min.  He was given empiric Zosyn 4.5g iv x 1 and Zyvox 500mg iv x 1 after blood cultures.  His repeat K after above therapies was 5.7.     Patient was transferred to [facility] for higher level of care, Video EEG monitoring began, A-line and Femoral line placed. Will continue current cooling mgt.    Hospital Course: 05/05/2020: Patient admitted as a transfer from [facility] for possible amphetamine and cocaine overdose. Video EEG monitoring began, A-line and Femoral line placed. Will continue current cooling mgt  05/06/2020: Patient had bronchoscopy last night done by Dr. Andrade and had a cocaine rock taken out of his lungs.    Admit Date: 5/5/2020  LOS: 1    CC: Overdose    Code Status: Full Code     SUBJECTIVE:     Interval History/Significant Events: Overnight, patient's ABG revealing severe acidosis, low bicarbonate, large base deficit. Hyperkalemia worsening to 6.6 with EKG changes. Patient received   Calcium Chloride administered (2g)  D50 given with 10 Units of Regular Insulin and 6 amps of Sodium Bicarbonate given.      Patient also developed Ventricular Tachycardia overnight which responded to Lidocaine 200 mg.      His repeat ABG with marked improvement and patient was started on Dialysis.    Review of Symptoms: ROS unable to be completed, patient unresponsive, intubated    Constitutional: Negative for fevers or chills.  Pulmonary:intubated   Cardiology: Negative for chest pain or palpitations.  GI: Negative for abdominal pain or constipation.  Neurologic: Negative for new weakness, headaches, or paresthesias.     Medications:  Continuous Infusions:   sodium chloride 0.9% 200 mL/hr at 05/06/20 0505    sodium chloride 0.9% 100 mL/hr at 05/06/20 0705    norepinephrine bitartrate-D5W 0.14 mcg/kg/min (05/06/20 0705)    vasopressin (PITRESSIN) infusion 0.04 Units/min (05/06/20 0730)     Scheduled Meds:   ampicillin-sulbactim (UNASYN) IVPB   1.5 g Intravenous Q6H    calcium chloride IVPB  2 g Intravenous Once    chlorhexidine  15 mL Mouth/Throat BID    Dextrose 10% Bolus  25 g Intravenous Once    Dextrose 10% Bolus  25 g Intravenous Once    Dextrose 10% Bolus  25 g Intravenous Once    famotidine  20 mg Per NG tube BID    heparin (porcine)  5,000 Units Subcutaneous Q8H    lidocaine HCL 20 mg/ml (2%)  200 mg Other Once    senna-docusate 8.6-50 mg  1 tablet Per OG tube Daily    sodium chloride 0.9%  500 mL Intravenous Once     PRN Meds:.acetaminophen, Dextrose 10% Bolus, Dextrose 10% Bolus, Dextrose 10% Bolus **AND** Dextrose 10% Bolus **AND** [COMPLETED] insulin regular, glucagon (human recombinant), insulin aspart U-100, magnesium sulfate IVPB, ondansetron, sodium phosphate IVPB, sodium phosphate IVPB, sodium phosphate IVPB    OBJECTIVE:   Vital Signs (Most Recent):   Temp: (!) 91.6 °F (33.1 °C) (05/06/20 0705)  Pulse: 63 (05/06/20 0705)  Resp: 16 (05/06/20 0705)  BP: 129/68 (05/06/20 0605)  SpO2: 99 % (05/06/20 0705)    Vital Signs (24h Range):   Temp:  [89.8 °F (32.1 °C)-92.8 °F (33.8 °C)] 91.6 °F (33.1 °C)  Pulse:  [62-94] 63  Resp:  [10-30] 16  SpO2:  [86 %-100 %] 99 %  BP: ()/() 129/68  Arterial Line BP: ()/(55-77) 100/64    ICP/CPP (Last 24h):        I & O (Last 24h):     Intake/Output Summary (Last 24 hours) at 5/6/2020 0757  Last data filed at 5/6/2020 0705  Gross per 24 hour   Intake 3496.44 ml   Output 1301 ml   Net 2195.44 ml     Physical Exam:  GA: intubated comfortable, no acute distress.   HEENT: No scleral icterus or JVD.   Pulmonary: Intubated Clear to auscultation A/P/L. No wheezing, crackles, or rhonchi.  Cardiac: RRR S1 & S2 w/o rubs/murmurs/gallops.   Abdominal: Bowel sounds present x 4. No appreciable hepatosplenomegaly.  Skin: No jaundice, rashes, or visible lesions.  Neuro:  --GCS: E1 VT M1  --Mental Status:  Unresponsive  --CN II-XII grossly intact.   --Pupils 3 mm, PERRL.   --Corneal reflex, gag,  cough intact.  --LUE strength: 1/5  --RUE strength: 1/5  --LLE strength: 1/5  --RLE strength: 1/5    Vent Data:   Vent Mode: A/C  Oxygen Concentration (%):  [] 50  Resp Rate Total:  [28 br/min-30 br/min] 28 br/min  Vt Set:  [500 mL] 500 mL  PEEP/CPAP:  [5 hgG93-59 cmH20] 12 cmH20  Mean Airway Pressure:  [12 voC63-37 cmH20] 19 cmH20    Lines/Drains/Airway:        Airway - Non-Surgical Endotracheal Tube (Active)   Secured at 22 cm 5/6/2020  5:10 AM   Measured At Lips 5/6/2020  5:10 AM   Secured Location Left 5/6/2020  5:10 AM   Secured by Commercial tube macias 5/6/2020  5:10 AM   Bite Block none 5/6/2020  5:10 AM   Site Condition Cool;Dry 5/6/2020  5:10 AM   Status Intact;Secured;Patent 5/6/2020  5:10 AM   Site Assessment Clean;Dry;No bleeding;No drainage 5/6/2020  5:10 AM   Cuff Pressure 24 cm H2O 5/6/2020  5:10 AM      Percutaneous Central Line Insertion/Assessment - Quad Lumen  05/05/20 1845 right femoral vein (Active)   Verification by X-ray Yes 5/5/2020  7:05 PM   Site Assessment No drainage;No redness;No swelling;No warmth 5/5/2020  7:05 PM   Line Securement Device Secured with sutures 5/5/2020  7:05 PM   Dressing Type Biopatch in place;Central line dressing with pants 5/5/2020  7:05 PM   Dressing Status Clean;Dry;Intact 5/5/2020  7:05 PM   Dressing Intervention Integrity maintained 5/5/2020  7:05 PM   Date on Dressing 05/05/20 5/5/2020  7:05 PM   Dressing Due to be Changed 05/12/20 5/5/2020  7:05 PM   Distal Patency/Care blood return present;infusing 5/5/2020  7:05 PM   Medial 1 Patency/Care blood return present;normal saline lock 5/5/2020  7:05 PM   Medial 2 Patency/Care blood return present;normal saline lock 5/5/2020  7:05 PM   Proximal 1 Patency/Care blood return present;infusing 5/5/2020  7:05 PM   Line Necessity Review Hemodynamic instability 5/5/2020  7:05 PM       Trialysis (Dialysis) Catheter 05/05/20 2143 left femoral (Active)           Arterial Line 05/05/20 2340 Left Brachial (Active)            NG/OG Tube 05/05/20 1200 Right nostril (Active)   Placement Check placement verified by aspirate characteristics 5/5/2020  7:05 PM   Tolerance no signs/symptoms of discomfort 5/5/2020  7:05 PM   Securement secured to nostril center 5/5/2020  7:05 PM   Clamp Status/Tolerance clamped;no emesis;no nausea;no residual;no restlessness 5/5/2020  7:05 PM   Suction Setting/Drainage Method suction at the bedside 5/5/2020  7:05 PM   Insertion Site Appearance no redness, warmth, tenderness, skin breakdown, drainage 5/5/2020  7:05 PM            Urethral Catheter 05/05/20 1751 Temperature probe (Active)   Site Assessment Clean;Intact 5/5/2020  7:05 PM   Collection Container Urimeter 5/5/2020  7:05 PM   Securement Method secured to top of thigh w/ adhesive device 5/5/2020  7:05 PM   Catheter Care Performed yes 5/5/2020  7:05 PM   Reason for Continuing Urinary Catheterization Critically ill in ICU requiring intensive monitoring 5/5/2020  7:05 PM   Output (mL) 15 mL 5/6/2020  7:05 AM       Trialysis (Dialysis) Catheter 05/05/20 2143 left femoral (Active)     Nutrition/Tube Feeds (if NPO state why): TF     Labs:  ABG:   Recent Labs   Lab 05/06/20  0441   PH 7.237*   PO2 283*   PCO2 57.8*   HCO3 24.6   POCSATURATED 100   BE -3     BMP:  Recent Labs   Lab 05/06/20  0309     138   K 4.9  4.9     104   CO2 20*  20*   BUN 24*  24*   CREATININE 2.2*  2.2*   GLU 83  83   MG 1.9  1.9   PHOS 5.4*  5.4*     LFT:   Lab Results   Component Value Date    AST 6,628 (H) 05/06/2020    ALT 4,129 (H) 05/06/2020    ALKPHOS 94 05/06/2020    BILITOT 1.9 (H) 05/06/2020    ALBUMIN 2.3 (L) 05/06/2020    ALBUMIN 2.3 (L) 05/06/2020    PROT 4.6 (L) 05/06/2020     CBC:   Lab Results   Component Value Date    WBC 3.95 05/06/2020    HGB 12.8 (L) 05/06/2020    HCT 40.8 05/06/2020    MCV 97 05/06/2020     05/06/2020     Microbiology x 7d:   Microbiology Results (last 7 days)     Procedure Component Value Units Date/Time    Blood  culture [[id number]] Collected:  05/05/20 1726    Order Status:  Completed Specimen:  Blood from Peripheral, Upper Arm, Left Updated:  05/06/20 0115     Blood Culture, Routine No Growth to date    Blood culture [[id number]] Collected:  05/05/20 1715    Order Status:  Completed Specimen:  Blood from Peripheral, Wrist, Right Updated:  05/06/20 0115     Blood Culture, Routine No Growth to date        Imaging: CXR   ET tube, enteric tube and esophageal probe appear unchanged.  Wires and tubing overlie the chest.    Interval placement of central catheter from an inferior approach with its tip overlying the mid right atrium.    Heart and lungs  appear unchanged when allowing for differences in technique and positioning.      Impression       Interval placement of central catheter from an inferior approach with its tip overlying the mid right atrium.         I personally reviewed the above image.    ASSESSMENT/PLAN:     Active Hospital Problems    Diagnosis    *Overdose    Hypotension    Lactic acidosis    Troponin level elevated    Transaminitis    Cardiac arrest - out of hospital    MARIA DEL ROSARIO (acute kidney injury)    Rhabdomyolysis    Shock    Acute respiratory failure with hypoxia and hypercapnia    ATN (acute tubular necrosis)    Hyperkalemia        Uninterrupted Critical Care/Counseling Time (not including procedures): 33 mins    Assessment/Plan:     Pulmonary  Acute respiratory failure with hypoxia and hypercapnia  Intubated    Vent Mode: A/C  Oxygen Concentration (%):  [] 50  Resp Rate Total:  [28 br/min-30 br/min] 28 br/min  Vt Set:  [500 mL] 500 mL  PEEP/CPAP:  [12 cmH20] 12 cmH20  Mean Airway Pressure:  [19 ccS02-88 cmH20] 19 cmH20    Cardiac/Vascular  Cardiac arrest - out of hospital  Patient being cooled to 33 degrees celsius     Troponin level elevated  Trend troponins x 3  EKG  · ECHOSeverely decreased left ventricular systolic function with severe global hypokinesis. The estimated ejection fraction  is 20%.  · Moderately to severely reduced right ventricular systolic function.  · Grade I (mild) left ventricular diastolic dysfunction consistent with impaired relaxation.  · Mild tricuspid regurgitation.  · The estimated PA systolic pressure is at least 17 mmHg.  Mechanically ventilated; cannot use inferior caval vein diameter to estimate central venous pressure.    Hypotension on two pressors Levophed 0.08 and Vasopressin 0.04  IVF bolus N/S 500cc  Continue with IVF  Hypotension not responsive to fluids due to hypothermia  Evaluated by Ultrasound of IVC    Renal/  Hyperkalemia  On dialysis    ATN (acute tubular necrosis)  Continue dialysis    MARIA DEL ROSARIO (acute kidney injury)  Continue dialysis per nephrology    Lactic acidosis  Continue to trend lactic acid  Normal saline bolus  IVF 75cc/hr    GI  Transaminitis  transaminitis AST 3280 increasing today  ALT 2605 increasing today  CMP q12hrs      Orthopedic  Rhabdomyolysis  Continue dialysis per nephrology    Other  * Overdose  39 year old man found down this morning by family s/p CPR transferred after cooling process begun 12 hours ago, Utox positive for Cocaine and Amphetamines,     Neuro:    Seizures/Epilepsy Monitoring Evaluation:  - Continue current management.  - Seizure Precautions  - Continue vEEG monitoring   -- CT Head pending  -- SBP goal MAP >65  --PT/OT/Speech    Pulmonary:   Acute Respiratory Failure   -- Daily CXR  -- Daily ABGs   Vent Mode: A/C  Oxygen Concentration (%):  [] 50  Resp Rate Total:  [28 br/min-30 br/min] 28 br/min  Vt Set:  [500 mL] 500 mL  PEEP/CPAP:  [5 ziT18-18 cmH20] 12 cmH20  Mean Airway Pressure:  [12 wfE67-34 cmH20] 19 cmH20    Cardiac:   Hypotension  -- Continue to monitor HR and BP   --MAP goal >65   -- on Levophed and Vasopressin  -- 2D echo 05/06/20  · --Severely decreased left ventricular systolic function with severe global hypokinesis. The estimated ejection fraction is 20%.  · Moderately to severely reduced right  ventricular systolic function.  · Grade I (mild) left ventricular diastolic dysfunction consistent with impaired relaxation.  · Mild tricuspid regurgitation.  · The estimated PA systolic pressure is at least 17 mmHg.  Mechanically ventilated; cannot use inferior caval vein diameter to estimate central venous pressure.      Renal:   --Continue to monitor I/O  --Continue to monitor BUN/Cr  -- BUN 21; Creatinine 1.9  -- PO4 elevated at 9.1 consider Sevalamer  -- follow labs CMP q12  -Patient started on dialysis today, potassium 6.1    ID:   -- hypothermia cooling to 34 degrees C   -- No leukocytosis     Hem/Onc:   --continue to monitor H/H       Endocrine: A1C 5.6  --Continue to monitor BG  -- Continue sliding scale  -- POCT q 6  --       Fluids/Electrolytes/Nutrition/GI:   - Start TF  -- Continue to monitor and replace electrolytes accordingly    PPX   -PUD: Pantoprazole 40 mg daily  -DVT: : heparin 5000 units q 8, TCD, SCD    Uninterrupted Critical Care/Counseling Time (not including procedures):  >50 min    Vladimir Rubin MD  Neurocritical Care  [facility]    -           The patient is being Prophylaxed for:  Venous Thromboembolism with: Mechanical or Chemical  Stress Ulcer with: H2B  Ventilator Pneumonia with: chlorhexidine oral care    Activity Orders          None        Full Code    Vladimir Rubin MD  Neurocritical Care [facility]

## 2020-05-06 NOTE — PROCEDURES
"Say Olivares is a 39 y.o. male patient.    Temp: (!) 91.6 °F (33.1 °C) (05/06/20 0705)  Pulse: 63 (05/06/20 0705)  Resp: 16 (05/06/20 0705)  BP: 129/68 (05/06/20 0605)  SpO2: 99 % (05/06/20 0705)  Weight: 89.3 kg (196 lb 13.9 oz) (05/05/20 1650)       Central Line  Date/Time: 5/6/2020 7:40 AM  Performed by: Shoaib Wade NP  Consent Done: Emergent Situation  Time out: Immediately prior to procedure a "time out" was called to verify the correct patient, procedure, equipment, support staff and site/side marked as required.  Indications: hemodialysis and vascular access  Preparation: skin prepped with ChloraPrep  Skin prep agent dried: skin prep agent completely dried prior to procedure  Sterile barriers: all five maximum sterile barriers used - cap, mask, sterile gown, sterile gloves, and large sterile sheet  Hand hygiene: hand hygiene performed prior to central venous catheter insertion  Location details: right femoral  Site selection rationale: high peep and vent settings  Catheter type: trialysis  Catheter Length: 20cm    Ultrasound guidance: yes  Vessel Caliber: medium, patent, compressibility normal  Needle advanced into vessel with real time Ultrasound guidance.  Guidewire confirmed in vessel.  Sterile sheath used.  Manometry: Yes  Number of attempts: 1  Complications: none  Estimated blood loss (mL): 25  Specimens: No  Implants: No  Complications: No          Shoaib Wade  5/6/2020  "

## 2020-05-06 NOTE — PROCEDURES
ICU EEG/VIDEO MONITORING REPORT    Say Olivares  [id number]  1980    DATE OF SERVICE: 5/5-6/2020    DATE OF ADMISSION: 5/5/2020  4:57 PM    ADMITTING PROVIDER: Marcellus Andrade MD    METHODOLOGY   Electroencephalographic (EEG) recording is with electrodes placed according to the International 10-20 placement system.  Thirty two (32) channels of digital signal are simultaneously recorded from the scalp and may include EKG, EMG, and/or eye monitors.   Recording band pass was 0.1 to 512 hz.  Digital video recording of the patient is simultaneously recorded with the EEG.  The nursing staff report clinical symptoms and may press an event button when the patient has symptoms of clinical interest to the treating physicians.  EEG and video recording is stored and archived in digital format.  The entire recording is visually reviewed and the times identified by computer analysis as being spikes or seizures are reviewed again.  Activation procedures which include photic stimulation, hyperventilation and instructing patients to perform simple task are done in selected patients.   Compresses spectral analysis (CSA) is also performed on the activity recorded from each individual channel.  This is displayed as a power display of frequencies from 0 to 30 Hz over time.   The CSA analysis is done and displayed continuously.  This is reviewed for asymmetries in power between homologous areas of the scalp and for presence of changes in power which canbe seen when seizures occur.  Sections of suspected abnormalities on the CSA is then compared with the original EEG recording.     Deanslist software was also utilized in the review of this study.  This software suite analyzes the EEG recording in multiple domains.  Coherence and rhythmicity is computed to identify EEG sections which may contain organized seizures.  Each channel undergoes analysis to detect presence of spike and sharp waves which have special and morphological  characteristic of epileptic activity.  The routine EEG recording is converted from spacial into frequency domain.  This is then displayed comparing homologous areas to identify areas of significant asymmetry.  Algorithm to identify non-cortically generated artifact is used to separate eye movement, EMG and other artifact from the EEG.      Recording Times  Start on 5/5/2020, 19:17  Stop on 5/6/2020, 07:30    A total of 12 hours and 13 minutes of EEG was recorded.    EEG FINDINGS - the study is done under hypothermia protocol.  Complete suppression is seen throughout the study without any discernable cerebral activity.  No epileptiform activity or electrographic seizures seen.    EKG:   Irregular rhythm seen.    IMPRESSION:   This C-EEG done under hypothermia protocol is abnormal due to the complete suppression seen throughout without any discernable cerebral activity seen.  No epileptiform activity or electrographic seizures seen.  Irregular heart rate noted on EKG.    No definitive conclusion regarding cerebral function can be made until hypothermia protocol is completed.     CLINICAL CORRELATION IS RECOMMENDED.    Zuleyka Lama MD, PATRICE(), RELL, AMADO.  Neurology-Epilepsy.  [facility].

## 2020-05-06 NOTE — PROCEDURES
Diagnosis: airway obstruction  Patient location during procedure: ICU  Procedure start time:0830 pm  Timeout: 0830 pm  Procedure end time: 0900    Preanesthetic Checklist  Completed: patient identified, site marked, surgical consent, pre-op evaluation, timeout performed, IV checked, risks and benefits discussed, monitors and equipment checked and anesthesia consent given      ICU BRONCHOSCOPY PROCEDURE NOTE   : Marcellus Andrade  ANESTHETIC: see MAR  PROCEDURE: Flexible bronchoscopy   Scope: Rubi Exera II (2.8)   Position: Supine   Intubation site: ETT  INDICATION: Pulmonary toilet/mucous plugging/BAL sampling   FINDINGS: mucosa quiet, no erythema or friability. White powdery substance seen obstructing R main, R upper, R middle, R lower, L lower, and L upper bronchi. Collapsing bronchi seen in b/l lower airways.   SPECIMENS: white powdery substance  COMPLICATIONS: None   IMPRESSION: airway obstruction likely cocaine  PLAN: maintain high peep ventilation to re-expand RLL and RML collapse

## 2020-05-06 NOTE — NURSING
Joe notified that sodium decreased from 138 to 129. CMP to be redrawn at 0400 with AM labs. No additional orders. Will continue to monitor.

## 2020-05-06 NOTE — NURSING
Notified Eleuterio CANTU of K of 6.6, Ca of 5.4, and Lactic Acid of 6. After arterial line placement, pt sustaining v tach. 200 mg of lidocaine administered per Eleuterio CANTU. 6 amps of bicarb given, 2g of calcium, insulin given. Nephrology paged per MD to facilitate dialysis.

## 2020-05-06 NOTE — PROGRESS NOTES
CRRT clotted after 8 hours.  Blood returned.  SLED restarted to left femoral line.  Tolerated well.  POC discussed with primary nurse.

## 2020-05-06 NOTE — ASSESSMENT & PLAN NOTE
Likely ischemic ATN from hypotension and cardiac arrest on 5/5/2020    Plan:  - Patient anuric despite mannitol administered  - Metabolic acidosis likely from shock causing lactic acidosis, as evidenced by elevated liver enzymes, Taya, and elevated troponins  - Will provide SLED treatment for metabolic clearance  - Strict I/Os and chart  - MAP >65  - Avoid nephrotoxic agents, NSAIDs, IV contrast, etc  - Renal US  - U/A, UPCr (when and if patient produces any urine)  - Renal function panel per RRt protocol  - Will continue to follow closely

## 2020-05-06 NOTE — ASSESSMENT & PLAN NOTE
39 year old man found down this morning by family s/p CPR transferred after cooling process begun 12 hours ago, Utox positive for Cocaine and Amphetamines,     Neuro:    Seizures/Epilepsy Monitoring Evaluation:  - Continue current management.  - Seizure Precautions  - Continue vEEG monitoring   -- CT Head pending  -- SBP goal MAP >65  --PT/OT/Speech    Pulmonary:   Acute Respiratory Failure   -- Daily CXR  -- Daily ABGs   Vent Mode: A/C  Oxygen Concentration (%):  [] 50  Resp Rate Total:  [28 br/min-30 br/min] 28 br/min  Vt Set:  [500 mL] 500 mL  PEEP/CPAP:  [5 yjH90-86 cmH20] 12 cmH20  Mean Airway Pressure:  [12 nvI46-32 cmH20] 19 cmH20    Cardiac:   Hypotension  -- Continue to monitor HR and BP   --MAP goal >65  -- 2D echo pending      Renal:   --Continue to monitor I/O  --Continue to monitor BUN/Cr  -- BUN 21; Creatinine 1.9  -- PO4 elevated at 9.1 consider Sevalamer  -- follow labs CMP q12  -Patient started on dialysis today, potassium 6.1    ID:   -- hypothermmia cooling to 34 degrees C   -- No leukocytosis     Hem/Onc:   --continue to monitor H/H       Endocrine: A1C 5.6  --Continue to monitor BG  -- Continue sliding scale  -- POCT q 6  --       Fluids/Electrolytes/Nutrition/GI:   - Start TF  -- Continue to monitor and replace electrolytes accordingly    PPX   -PUD: Pantoprazole 40 mg daily  -DVT: : heparin 5000 units q 8, TCD, SCD    Uninterrupted Critical Care/Counseling Time (not including procedures):  >50 min    Vladimir Rubin MD  Neurocritical Care  [facility]    -

## 2020-05-06 NOTE — PROGRESS NOTES
KIM Diaz notified of 8 beat run of vtach. Still awaiting Dialysis at this time. No new orders. Will continue to monitor.

## 2020-05-07 NOTE — ASSESSMENT & PLAN NOTE
Patient being warmed to 37 degrees celsius  troponins x 3  EKG on 05/05/20  Vent. Rate : 082 BPM     Atrial Rate : 082 BPM     P-R Int : 170 ms          QRS Dur : 082 ms      QT Int : 450 ms       P-R-T Axes : 065 -22 074 degrees     QTc Int : 525 ms    Normal sinus rhythm  Nonspecific ST and T wave abnormality  Prolonged QT  Abnormal ECG    Repeat EKG today

## 2020-05-07 NOTE — CONSULTS
[facility]  Gastroenterology Service  Consult Note    Patient Name: Say Olivares  MRN: [id number]  Admission Date: 5/5/2020  Hospital Length of Stay: 2 days  Code Status: Full Code   Attending Provider: Marcellus Andrade MD   Consulting Provider: Caleb Concepcion MD  Primary Care Physician: Primary Doctor No  Principal Problem:Overdose    Inpatient consult to Gastroenterology  Consult performed by: Caleb Concepcion MD  Consult ordered by: Tayler Diaz PA-C        Subjective:     HPI: Say Olivares is a 39 y.o. male with only known history of back pain admitted on 5/5 after being found down with cardiac arrest s/p CPR, for possible drug overdose. GI consulted for coffee ground emesis and bloody rectal tube output.    Patient is intubated, currently being managed for cardiogenic shock and multiorgan failure with acute liver injury, coagulopathy, renal failure requiring SLED. Patient has not been on PPI prophylaxis since admission, but started overnight. Overnight, had 550 cc of coffee ground and dark NGT residual output. FlexiSeal placed with red/brown watery stool with mucous. Currently on levophed and vasopressin with unchanged rates (except when increasing UF with SLED). Hb is 12.3 from 14 since admission, , INR 2.1 -> 3.4, fibrinogen 78, LFTs to >10K, troponin 12, covid testing on 5/5 X1 negative. Echo showed EF of 20%.        History reviewed. No pertinent past medical history.    History reviewed. No pertinent surgical history.    History reviewed. No pertinent family history.    Social History     Socioeconomic History    Marital status: Single     Spouse name: Not on file    Number of children: Not on file    Years of education: Not on file    Highest education level: Not on file   Occupational History    Not on file   Social Needs    Financial resource strain: Not on file    Food insecurity:     Worry: Not on file     Inability: Not on file    Transportation needs:      Medical: Not on file     Non-medical: Not on file   Tobacco Use    Smoking status: Not on file   Substance and Sexual Activity    Alcohol use: Not on file    Drug use: Not on file    Sexual activity: Not on file   Lifestyle    Physical activity:     Days per week: Not on file     Minutes per session: Not on file    Stress: Not on file   Relationships    Social connections:     Talks on phone: Not on file     Gets together: Not on file     Attends Anabaptism service: Not on file     Active member of club or organization: Not on file     Attends meetings of clubs or organizations: Not on file     Relationship status: Not on file   Other Topics Concern    Not on file   Social History Narrative    Not on file       No current facility-administered medications on file prior to encounter.      No current outpatient medications on file prior to encounter.       Review of patient's allergies indicates:  No Known Allergies    Review of Systems:   Limited given patient is intubated    Objective:     Vitals:    05/07/20 0600   BP: 103/70   Pulse: 62   Resp: (!) 3   Temp: (!) 91.4 °F (33 °C)       General: Intubated, sedated  HEENT: Normocephalic, Atraumatic. Intubated, NG tube in place with black output  Resp: Ventilator breath sounds  Cardiac: S1 and S2 normal  Abdomen: Normoactive bowel sounds. Non-distended. Normal tympany. Soft.  Rectal: FlexiSeal in place with maroon liquid stool  Extremities: No peripheral edema.   Neurologic: Sedated    Significant Labs:  Recent Labs   Lab 05/06/20  0309 05/07/20  0145 05/07/20  0523   HGB 12.8* 12.3* 12.4*       Lab Results   Component Value Date    WBC 20.04 (H) 05/07/2020    HGB 12.4 (L) 05/07/2020    HCT 39.8 (L) 05/07/2020     (H) 05/07/2020     (L) 05/07/2020       Lab Results   Component Value Date     05/07/2020     05/07/2020    K 4.6 05/07/2020    K 4.6 05/07/2020     05/07/2020     05/07/2020    CO2 15 (L) 05/07/2020    CO2 15 (L)  05/07/2020    BUN 7 05/07/2020    BUN 7 05/07/2020    CREATININE 1.0 05/07/2020    CREATININE 1.0 05/07/2020    CALCIUM 7.3 (L) 05/07/2020    CALCIUM 7.3 (L) 05/07/2020    ANIONGAP 15 05/07/2020    ANIONGAP 15 05/07/2020    ESTGFRAFRICA >60.0 05/07/2020    ESTGFRAFRICA >60.0 05/07/2020    EGFRNONAA >60.0 05/07/2020    EGFRNONAA >60.0 05/07/2020       Lab Results   Component Value Date    ALT 9,383 (H) 05/07/2020    AST 14,433 (H) 05/07/2020    ALKPHOS 114 05/07/2020    BILITOT 4.0 (H) 05/07/2020       Lab Results   Component Value Date    INR 3.0 (H) 05/07/2020    INR 3.4 (H) 05/07/2020    INR 2.1 (H) 05/05/2020       Significant Imaging:  Reviewed pertinent radiology findings.       Assessment/Plan:     Say Olivares is a 39 y.o. male with history of chronic back pain and suspected opioid abuse who presents after being found down with cardiac arrest on the scene, now intubated, sedated and being managed for cardiogenic shock / multiorgan failure with likely DIC.    Likely upper GI bleed. PPI started overnight. Rectal tube placed with maroon liquid output and NG with coffee ground output. NG tube / Flexiseal can cause trauma in setting of patient's coagulopathy / DIC.    Problem List:  1. Upper GI bleed  2. Coagulopathy  3. DIC    Plan:  1. Recommend correction of coagulopathy (goal plt >50, INR <1.5, fibrinogen >100)  2. Continue IV PPI gtt  3. Avoid NG tube to suction  4. Will determine endoscopy based on resuscitation. Continue to monitor Hgb. Risks of endoscopy in setting of severe coagulopathy include causing more bleeding / trauma, and would hold off for now.    Thank you for involving us in the care of Say Olivares. Please call with any additional questions, concerns or changes in the patient's clinical status.    Caleb Concepcion MD  Gastroenterology Fellow PGY IV   [facility]

## 2020-05-07 NOTE — ASSESSMENT & PLAN NOTE
39 year old man found down this morning by family s/p CPR transferred after cooling process begun 12 hours ago, Utox positive for Cocaine and Amphetamines,     Neuro:    Seizures/Epilepsy Monitoring Evaluation:  - Continue current management.  - Seizure Precautions  - Continue vEEG monitoring   -- CT Head pending  -- SBP goal MAP >65  --PT/OT/Speech    Pulmonary:   Acute Respiratory Failure   -- Daily CXR  -- Daily ABGs   Vent Mode: A/C  Oxygen Concentration (%):  [50] 50  Resp Rate Total:  [28 br/min-32 br/min] 32 br/min  Vt Set:  [500 mL-530 mL] 530 mL  PEEP/CPAP:  [12 cmH20] 12 cmH20  Mean Airway Pressure:  [19 paO86-45 cmH20] 21 cmH20    Cardiac:   Hypotension  -- Continue to monitor BP   --MAP goal >65  -- 2D echo   -- On two pressors, Vasopressin 0.04 and Levophed 0.7 needing to go up on pressor requirements due to hemodialysis    Tachycardia   HR in the 170's sustained,   IV metoprolol 5mg given        Renal:   --Continue to monitor I/O  --Continue to monitor BUN/Cr  -- BUN 21; Creatinine 1.9  -- PO4 elevated patient on dialysis  -- follow labs CMP q12  -Patient continued on CRRT, replacing calcium through CRRT    ID:   -- Rewarming patient today to targeted temperature 37 degrees celsius  -- No leukocytosis   -- Continue ampicillin 3g Q6hrs    Hem/Onc:   --continue to monitor H/H       Endocrine: A1C 5.6  --Continue to monitor BG  -- Continue sliding scale  -- POCT q 6      Fluids/Electrolytes/Nutrition/GI:   - Continue TF  -- Continue to monitor and replace electrolytes accordingly    PPX   -PUD: Pantoprazole drip for GI bleed  -DVT: : heparin 5000 units q 8, TCD, SCD    Uninterrupted Critical Care/Counseling Time (not including procedures):  >50 min    Vladimir Rubin MD  Neurocritical Care  [facility]    -

## 2020-05-07 NOTE — NURSING
POC reviewed with family at 0500. Family verbalized understanding. Questions and concerns addressed. Levo titrated for MAP >65. Vaso infusing. D10 for blood glucose management. GI consulted for possible bleed. Blood products initiated. Refer to flow sheet for lab values.  Will continue to monitor. See flowsheets for full assessment and VS info

## 2020-05-07 NOTE — PROGRESS NOTES
Nephrology Progress Note    Patient followed for Taya  Patient was seen on RRT / SLED which was running for uremic toxin clearance / UF for volume management.    Vitals:    05/07/20 0905   BP: 114/66   Pulse: 64   Resp: (!) 32   Temp: (!) 91.6 °F (33.1 °C)       CMP  Sodium   Date Value Ref Range Status   05/07/2020 139 136 - 145 mmol/L Final     Potassium   Date Value Ref Range Status   05/07/2020 4.8 3.5 - 5.1 mmol/L Final     Chloride   Date Value Ref Range Status   05/07/2020 102 95 - 110 mmol/L Final     CO2   Date Value Ref Range Status   05/07/2020 22 (L) 23 - 29 mmol/L Final     Glucose   Date Value Ref Range Status   05/07/2020 68 (L) 70 - 110 mg/dL Final     BUN, Bld   Date Value Ref Range Status   05/07/2020 6 6 - 20 mg/dL Final     Creatinine   Date Value Ref Range Status   05/07/2020 0.9 0.5 - 1.4 mg/dL Final     Calcium   Date Value Ref Range Status   05/07/2020 7.0 (L) 8.7 - 10.5 mg/dL Final     Total Protein   Date Value Ref Range Status   05/07/2020 5.2 (L) 6.0 - 8.4 g/dL Final     Albumin   Date Value Ref Range Status   05/07/2020 2.5 (L) 3.5 - 5.2 g/dL Final     Total Bilirubin   Date Value Ref Range Status   05/07/2020 4.5 (H) 0.1 - 1.0 mg/dL Final     Comment:     For infants and newborns, interpretation of results should be based  on gestational age, weight and in agreement with clinical  observations.  Premature Infant recommended reference ranges:  Up to 24 hours.............<8.0 mg/dL  Up to 48 hours............<12.0 mg/dL  3-5 days..................<15.0 mg/dL  6-29 days.................<15.0 mg/dL       Alkaline Phosphatase   Date Value Ref Range Status   05/07/2020 123 55 - 135 U/L Final     AST   Date Value Ref Range Status   05/07/2020 15,515 (H) 10 - 40 U/L Final     ALT   Date Value Ref Range Status   05/07/2020 9,773 (H) 10 - 44 U/L Final     Anion Gap   Date Value Ref Range Status   05/07/2020 15 8 - 16 mmol/L Final     eGFR if    Date Value Ref Range Status    05/07/2020 >60.0 >60 mL/min/1.73 m^2 Final     eGFR if non    Date Value Ref Range Status   05/07/2020 >60.0 >60 mL/min/1.73 m^2 Final     Comment:     Calculation used to obtain the estimated glomerular filtration  rate (eGFR) is the CKD-EPI equation.          CBC  Lab Results   Component Value Date    WBC 20.04 (H) 05/07/2020    HGB 12.4 (L) 05/07/2020    HCT 39.8 (L) 05/07/2020     (H) 05/07/2020     (L) 05/07/2020         Intake/Output Summary (Last 24 hours) at 5/7/2020 0949  Last data filed at 5/7/2020 0905  Gross per 24 hour   Intake 8328.03 ml   Output 5947 ml   Net 2381.03 ml     Assessment/Plan:      * Overdose  Per primary team     TAYA (acute kidney injury)  Likely ischemic ATN from hypotension and cardiac arrest on 5/5/2020     Plan:  - Patient hemodynamically not stable for intermittent HD yet.  - Will continue RRT / SLED for volume and toxin clearance.    - SLED prescription and dialysate baths were reviewed and changes made according to most recent labs.   - Patient getting ~100 mL/hr in IV gtts constantly  - UF adjusted to 300-350 mL/hr as tolerated, keep MAP >65, goal for slight negative in the next 24 hrs  - Primary team to start rewarming patient today; will adjust dialysate temperature as they go higher on the temperature to goal 36.5  - Follow labs serially while on RRT / SLED to monitor electrolytes and follow replacement protocol as needed.   - Continues anuric  - Metabolic acidosis likely from shock causing lactic acidosis, as evidenced by elevated liver enzymes, Taya, and elevated troponins; will continue on bicarb 40 bath  - Strict I/Os and chart  - MAP >65  - Avoid nephrotoxic agents, NSAIDs, IV contrast, etc  - Renal US  - U/A, UPCr (when and if patient produces any urine)  - Renal function panel per RRt protocol  - Will continue to follow closely     Acute respiratory failure with hypoxia and hypercapnia  Per primary team     Cardiac arrest - out of  hospital  Per primary team     Shock  On vasopressors by primary team           Thank you for your consult. I will follow-up with patient. Please contact us if you have any additional questions.    Luis Armando Boo MD  Nephrology  [facility]

## 2020-05-07 NOTE — ASSESSMENT & PLAN NOTE
Patient has Shock liver, transaminitis  AST and ALT markedly elevated at 14,433 and 9,383,  Ammonia elevated at 208, will start Rifaximin, lactulose,  INR  Vitamin K x 3 doses, and FFP 2 units, check INR today,   start Midodrine 5mg for hypotension, Patient developed afib with RVR, started metoprolol

## 2020-05-07 NOTE — PROGRESS NOTES
Machine settings changes as per nephro:    Hco3 bath increased to 40   Switch back to 4k bath, (latest K results at 4.6 from 5.6)

## 2020-05-07 NOTE — ASSESSMENT & PLAN NOTE
troponins elevated from 4 to 12, will trend x 3  EKG  · ECHO: Severely decreased left ventricular systolic function with severe global hypokinesis. The estimated ejection fraction is 20%.  · Moderately to severely reduced right ventricular systolic function.  · Grade I (mild) left ventricular diastolic dysfunction consistent with impaired relaxation.  · Mild tricuspid regurgitation.  · The estimated PA systolic pressure is at least 17 mmHg.  Mechanically ventilated; cannot use inferior caval vein diameter to estimate central venous pressure.

## 2020-05-07 NOTE — PROCEDURES
ICU EEG/VIDEO MONITORING REPORT    Say Olivares  [id number]  1980    DATE OF SERVICE: 5/6-7/2020    DATE OF ADMISSION: 5/5/2020  4:57 PM    ADMITTING PROVIDER: Marcellus Andrade MD    METHODOLOGY   Electroencephalographic (EEG) recording is with electrodes placed according to the International 10-20 placement system.  Thirty two (32) channels of digital signal are simultaneously recorded from the scalp and may include EKG, EMG, and/or eye monitors.   Recording band pass was 0.1 to 512 hz.  Digital video recording of the patient is simultaneously recorded with the EEG.  The nursing staff report clinical symptoms and may press an event button when the patient has symptoms of clinical interest to the treating physicians.  EEG and video recording is stored and archived in digital format.  The entire recording is visually reviewed and the times identified by computer analysis as being spikes or seizures are reviewed again.  Activation procedures which include photic stimulation, hyperventilation and instructing patients to perform simple task are done in selected patients.   Compresses spectral analysis (CSA) is also performed on the activity recorded from each individual channel.  This is displayed as a power display of frequencies from 0 to 30 Hz over time.   The CSA analysis is done and displayed continuously.  This is reviewed for asymmetries in power between homologous areas of the scalp and for presence of changes in power which canbe seen when seizures occur.  Sections of suspected abnormalities on the CSA is then compared with the original EEG recording.     [employer] software was also utilized in the review of this study.  This software suite analyzes the EEG recording in multiple domains.  Coherence and rhythmicity is computed to identify EEG sections which may contain organized seizures.  Each channel undergoes analysis to detect presence of spike and sharp waves which have special and morphological  characteristic of epileptic activity.  The routine EEG recording is converted from spacial into frequency domain.  This is then displayed comparing homologous areas to identify areas of significant asymmetry.  Algorithm to identify non-cortically generated artifact is used to separate eye movement, EMG and other artifact from the EEG.      Recording Times  Start on 5/6/2020, 07:30  Stop on 5/7/2020, 07:00    A total of 23 hours and 30 minutes of EEG was recorded.    EEG FINDINGS - the study is done under hypothermia protocol.  Complete suppression is seen throughout the study without any discernable cerebral activity.  Occasional electrode artifacts from tubing and stimulation are noted.  No epileptiform activity or electrographic seizures seen.    EKG:   Irregular rhythm seen.    IMPRESSION:   This C-EEG done under hypothermia protocol is abnormal due to the complete suppression seen throughout without any discernable cerebral activity seen.  No epileptiform activity or electrographic seizures seen.  Irregular heart rate noted on EKG.    No definitive conclusion regarding cerebral function can be made until hypothermia protocol is completed.   No change from prior day.  CLINICAL CORRELATION IS RECOMMENDED.    Zuleyka Lama MD, PATRICE(), RELL, AMADO.  Neurology-Epilepsy.  [facility].

## 2020-05-07 NOTE — NURSING
Per NGT, residuals noted to be 550 cc. Coffee ground and dark. PA notified and at bedside. Ordered to give Protonix IV and start drip. Gastro consulted.

## 2020-05-07 NOTE — ASSESSMENT & PLAN NOTE
Intubated    Vent Mode: A/C  Oxygen Concentration (%):  [50] 50  Resp Rate Total:  [32 br/min] 32 br/min  Vt Set:  [530 mL] 530 mL  PEEP/CPAP:  [10 fkM26-59 cmH20] 10 cmH20  Mean Airway Pressure:  [20 nxA67-79 cmH20] 20 cmH20

## 2020-05-07 NOTE — PROGRESS NOTES
DAILY CHECKS    Sled continuous with uf rate 300cc/hr. Latest k at 5.5, switched to 3k until the next lab draw.

## 2020-05-07 NOTE — PLAN OF CARE
POC reviewed with pt's mother at 1400, mother verbalized understanding. Questions and concerns addressed. Rewarming initiated at 1000 at 0.3 degrees C/hr. Neuro exam unchanged. cEEG in place.Levo gtt titrated up to 0.74 mcg/kg/min to maintain MAP > 65, vaso at 0.04 u/min. Pt in NSR this am, sinus tach this afternoon. Vent at 50% FiO2, 10 PEEP. 400 mL coffee ground emesis from NGT, dark red tinged stools from Flexi. FFP x 2 and vitamin k x1 administered. 300 UF attempted on CRRT, pt did not tolerate, returned to 200 UF. Pt progressing toward goals. Will continue to monitor. See flowsheets for full assessment and VS info.

## 2020-05-07 NOTE — PROCEDURES
[facility]     PRELIMINARY C-EEG REPORT:  Review: 5/7/2020, 07:00 - 16:47     Complete suppression persists even after re-warming initiated ~ 10:00  Intermittent artifacts are also seen.   No epileptiform activity or electrographic seizures seen.  Irregular heart rate noted on EKG.    Full report to follow.  Will continue to monitor.     Thank you for involving us in the care of this patient.    Zuleyka Lama MD, PATRICE(), FACNS, FAROHIT.  Neurology-Epilepsy.  [facility].

## 2020-05-07 NOTE — PROGRESS NOTES
Pt pressor requirements increasing with increase of UF from 200 to 300. Levo gtt titrated from 0.1 mcg/kg/min before UF change to 0.24 mcg/kg/min now. Per MD Vladimir, okay to continue to titrate levo for MAP > 65, leave UF at 300. Nephrology team at bedside, agreed to keep UF at 300 and titrate pressors as needed. WCTM.

## 2020-05-07 NOTE — SUBJECTIVE & OBJECTIVE
Admit Date: 5/5/2020  LOS: 2    CC: Overdose    Code Status: Full Code     SUBJECTIVE:     Interval History/Significant Events: Rewarming started today 05/07. Patient had GI bleed overnight, has shock liver with worsening transaminitis AST and ALT markedly elevated at 14,433 and 9,383, on is on dialysis, hypocalcemia, Ammonia 208, will start Rifaximin, lactulose, Vitamin K x 3 doses, and FFP 2 units, check INR today, start Midodrine 5mg Patient developed afib with RVR, started metoprolol      Review of Symptoms: ROS unable to be completed, patient unresponsive, intubated    Constitutional: Negative for fevers or chills.  Pulmonary:intubated   Cardiology: Negative for chest pain or palpitations.  GI: Negative for abdominal pain or constipation.  Neurologic: Negative for new weakness, headaches, or paresthesias.     Medications:  Continuous Infusions:   sodium chloride 0.9% 200 mL/hr at 05/07/20 1400    dextrose 10 % in water (D10W) 20 mL/hr at 05/07/20 1305    norepinephrine bitartrate-D5W 0.7 mcg/kg/min (05/07/20 1357)    pantoprozole (PROTONIX) IV infusion 8 mg/hr (05/07/20 1305)    vasopressin (PITRESSIN) infusion 0.04 Units/min (05/07/20 1305)     Scheduled Meds:   ampicillin-sulbactim (UNASYN) IVPB  3 g Intravenous Q6H    chlorhexidine  15 mL Mouth/Throat BID    lactulose  10 g Per NG tube TID    midodrine  5 mg Per NG tube Q8H    phytonadione ((AQUA-MEPHYTON) IVPB  5 mg Intravenous Daily    rifAXImin  550 mg Per OG tube BID     PRN Meds:.sodium chloride, Dextrose 10% Bolus, Dextrose 10% Bolus, glucagon (human recombinant), insulin aspart U-100, magnesium sulfate IVPB, ondansetron, sodium phosphate IVPB, sodium phosphate IVPB, sodium phosphate IVPB    OBJECTIVE:   Vital Signs (Most Recent):   Temp: (!) 93.6 °F (34.2 °C) (05/07/20 1400)  Pulse: (!) 111 (05/07/20 1400)  Resp: (!) 0 (05/07/20 1400)  BP: (!) 100/59 (05/07/20 1300)  SpO2: 100 % (05/07/20 1400)    Vital Signs (24h Range):   Temp:  [91 °F  (32.8 °C)-93.6 °F (34.2 °C)] 93.6 °F (34.2 °C)  Pulse:  [] 111  Resp:  [0-33] 0  SpO2:  [94 %-100 %] 100 %  BP: (100-148)/(57-89) 100/59  Arterial Line BP: ()/(54-74) 100/64    ICP/CPP (Last 24h):        I & O (Last 24h):     Intake/Output Summary (Last 24 hours) at 5/7/2020 1408  Last data filed at 5/7/2020 1400  Gross per 24 hour   Intake 9192.92 ml   Output 6388 ml   Net 2804.92 ml     Physical Exam:  GA: intubated comfortable, no acute distress.   HEENT: No scleral icterus or JVD.   Pulmonary: Intubated Clear to auscultation A/P/L. No wheezing, crackles, or rhonchi.  Cardiac: RRR S1 & S2 w/o rubs/murmurs/gallops.   Abdominal: Bowel sounds present x 4. No appreciable hepatosplenomegaly.  Skin: No jaundice, rashes, or visible lesions.  Neuro:  --GCS: E1 VT M1  --Mental Status:  Unresponsive  --CN II-XII grossly intact.   --Pupils 3 mm, fixed non reactive.   --Corneal reflex, gag, cough intact.  --LUE strength: 1/5  --RUE strength: 1/5  --LLE strength: 1/5  --RLE strength: 1/5    Vent Data:   Vent Mode: A/C  Oxygen Concentration (%):  [50] 50  Resp Rate Total:  [28 br/min-32 br/min] 32 br/min  Vt Set:  [500 mL-530 mL] 530 mL  PEEP/CPAP:  [12 cmH20] 12 cmH20  Mean Airway Pressure:  [19 cqE19-53 cmH20] 21 cmH20    Lines/Drains/Airway:        Airway - Non-Surgical Endotracheal Tube (Active)   Secured at 22 cm 5/6/2020  5:10 AM   Measured At Lips 5/6/2020  5:10 AM   Secured Location Left 5/6/2020  5:10 AM   Secured by Commercial tube macias 5/6/2020  5:10 AM   Bite Block none 5/6/2020  5:10 AM   Site Condition Cool;Dry 5/6/2020  5:10 AM   Status Intact;Secured;Patent 5/6/2020  5:10 AM   Site Assessment Clean;Dry;No bleeding;No drainage 5/6/2020  5:10 AM   Cuff Pressure 24 cm H2O 5/6/2020  5:10 AM      Percutaneous Central Line Insertion/Assessment - Quad Lumen  05/05/20 1845 right femoral vein (Active)   Verification by X-ray Yes 5/5/2020  7:05 PM   Site Assessment No drainage;No redness;No swelling;No  warmth 5/5/2020  7:05 PM   Line Securement Device Secured with sutures 5/5/2020  7:05 PM   Dressing Type Biopatch in place;Central line dressing with pants 5/5/2020  7:05 PM   Dressing Status Clean;Dry;Intact 5/5/2020  7:05 PM   Dressing Intervention Integrity maintained 5/5/2020  7:05 PM   Date on Dressing 05/05/20 5/5/2020  7:05 PM   Dressing Due to be Changed 05/12/20 5/5/2020  7:05 PM   Distal Patency/Care blood return present;infusing 5/5/2020  7:05 PM   Medial 1 Patency/Care blood return present;normal saline lock 5/5/2020  7:05 PM   Medial 2 Patency/Care blood return present;normal saline lock 5/5/2020  7:05 PM   Proximal 1 Patency/Care blood return present;infusing 5/5/2020  7:05 PM   Line Necessity Review Hemodynamic instability 5/5/2020  7:05 PM       Trialysis (Dialysis) Catheter 05/05/20 2143 left femoral (Active)           Arterial Line 05/05/20 2340 Left Brachial (Active)           NG/OG Tube 05/05/20 1200 Right nostril (Active)   Placement Check placement verified by aspirate characteristics 5/5/2020  7:05 PM   Tolerance no signs/symptoms of discomfort 5/5/2020  7:05 PM   Securement secured to nostril center 5/5/2020  7:05 PM   Clamp Status/Tolerance clamped;no emesis;no nausea;no residual;no restlessness 5/5/2020  7:05 PM   Suction Setting/Drainage Method suction at the bedside 5/5/2020  7:05 PM   Insertion Site Appearance no redness, warmth, tenderness, skin breakdown, drainage 5/5/2020  7:05 PM            Urethral Catheter 05/05/20 1751 Temperature probe (Active)   Site Assessment Clean;Intact 5/5/2020  7:05 PM   Collection Container Urimeter 5/5/2020  7:05 PM   Securement Method secured to top of thigh w/ adhesive device 5/5/2020  7:05 PM   Catheter Care Performed yes 5/5/2020  7:05 PM   Reason for Continuing Urinary Catheterization Critically ill in ICU requiring intensive monitoring 5/5/2020  7:05 PM   Output (mL) 15 mL 5/6/2020  7:05 AM       Trialysis (Dialysis) Catheter 05/05/20 2143 left  femoral (Active)     Nutrition/Tube Feeds (if NPO state why): TF     Labs:  ABG:   Recent Labs   Lab 05/07/20  1126   PH 7.321*   PO2 150*   PCO2 51.8*   HCO3 26.7   POCSATURATED 99   BE 1     BMP:  Recent Labs   Lab 05/07/20  1122     139   K 4.3  4.3   CL 98  98   CO2 24  24   BUN 5*  5*   CREATININE 0.9  0.9   GLU 86  86   MG 2.0   PHOS 3.6     LFT:   Lab Results   Component Value Date    AST 13,665 (H) 05/07/2020    ALT 8,592 (H) 05/07/2020    ALKPHOS 121 05/07/2020    BILITOT 4.4 (H) 05/07/2020    ALBUMIN 2.6 (L) 05/07/2020    PROT 5.0 (L) 05/07/2020     CBC:   Lab Results   Component Value Date    WBC 20.04 (H) 05/07/2020    HGB 12.4 (L) 05/07/2020    HCT 39.8 (L) 05/07/2020     (H) 05/07/2020     (L) 05/07/2020     Microbiology x 7d:   Microbiology Results (last 7 days)     Procedure Component Value Units Date/Time    Blood culture [[id number]] Collected:  05/07/20 1110    Order Status:  Sent Specimen:  Blood from Peripheral, Antecubital, Left Updated:  05/07/20 1231    Blood culture [[id number]] Collected:  05/07/20 1115    Order Status:  Sent Specimen:  Blood from Peripheral, Forearm, Left Updated:  05/07/20 1230    Culture, Respiratory with Gram Stain [[id number]] Collected:  05/07/20 0433    Order Status:  Sent Specimen:  Respiratory from Sputum, Induced Updated:  05/07/20 0553    Blood culture [[id number]] Collected:  05/05/20 1726    Order Status:  Completed Specimen:  Blood from Peripheral, Upper Arm, Left Updated:  05/06/20 2012     Blood Culture, Routine No Growth to date      No Growth to date    Blood culture [[id number]] Collected:  05/05/20 1715    Order Status:  Completed Specimen:  Blood from Peripheral, Wrist, Right Updated:  05/06/20 2012     Blood Culture, Routine No Growth to date      No Growth to date        Imaging: CXR   ET tube, enteric tube and esophageal probe appear unchanged.  Wires and tubing overlie the chest.    Interval placement of central catheter from  an inferior approach with its tip overlying the mid right atrium.    Heart and lungs  appear unchanged when allowing for differences in technique and positioning.      Impression       Interval placement of central catheter from an inferior approach with its tip overlying the mid right atrium.         I personally reviewed the above image.    ASSESSMENT/PLAN:     Active Hospital Problems    Diagnosis    *Overdose    Hypotension    Lactic acidosis    Troponin level elevated    Transaminitis    Cardiac arrest - out of hospital    MARIA DEL ROSARIO (acute kidney injury)    Rhabdomyolysis    Shock    Acute respiratory failure with hypoxia and hypercapnia    ATN (acute tubular necrosis)    Hyperkalemia        Uninterrupted Critical Care/Counseling Time (not including procedures): 33 mins

## 2020-05-07 NOTE — PROGRESS NOTES
[facility]  Neurocritical Care  Progress Note    Admit Date: 5/5/2020  Service Date: 05/07/2020  Length of Stay: 2    Subjective:     Chief Complaint: Overdose    History of Present Illness: Mr. Olivares is a 38 yo man with PMH of opioid abuse admitted as a transfer from [facility] for possible cocaine and amphetamine overdose. Patient has a hx of chronic back pain and was found down by his family at 6am this morning snoring on the couch, and when by 7:30 am his family realized he was unresponsive they called EMS.   CPR was performed by a bystander until EMS arrived and placed an Ambu® Spike LTS-D laryngeal tube.   Patient was in asystole when EMS got to the scene and was given given Epi  and 2 mg of Narcan. Patient then went into ventricular fibrillation, and was defibrillated x 1, and arrived in the ED in NSR.     Per transfer note, Patient was hypotensive after arrival to lowest of 51/41, then gradually went to 87/66, then 117/58 by 1pm.   Patient's pupils were found to be fixed and dilated with agonal respirations.  He had track marks to right AC.  He was subsequently intubated in the ED after arrival.   Patient was cooled for less than 12 hours prior to arrival, will continue for 24hrs     Upon Labs revealed elevated LA to 16, Cr of 2, K of 6.3.  His UDS was positive for amphetamines and cocaine.  COVID was negative at [facility], repeat COVID test pending.  CT chest showed debris/mass or secretion in the right proximal lower lobe bronchus along with intraluminal calcifications (?broncholiths).  Recommend bronchoscopy.  Right lower lobe atelectasis with shift of the mediastinum to the right.  Centrilobar emphysema with patchy right upper lobe pneumonia.  Smaller pneumonia in the left upper lobe along the fissure and significant bibasilar infiltrates c/w multifocal pneumonia.       He was given Na Bicarb iv x 4, had NGT and roque placed, Kayexalate 30g, 10 U insulin and CaGluconate.  He was  started on Levophed for the hypotension at 4 mamie/min.  He was given empiric Zosyn 4.5g iv x 1 and Zyvox 500mg iv x 1 after blood cultures.  His repeat K after above therapies was 5.7.     Patient was transferred to [facility] for higher level of care, Video EEG monitoring began, A-line and Femoral line placed. Will continue current cooling mgt.    Hospital Course: 05/05/2020: Patient admitted as a transfer from [facility] for possible amphetamine and cocaine overdose. Video EEG monitoring began, A-line and Femoral line placed. Will continue current cooling mgt  05/06/2020: Patient had bronchoscopy last night done by Dr. Andrade and had a cocaine rock taken out of his lungs.  05/07/2020: Patient had GI bleed overnight, GI consulted, recommended  PPI drip, has shock liver with worsening transaminitis AST and ALT markedly elevated at 14,433 and 9,383, on is on dialysis, hypocalcemia, Patient also has elevated CK at 40,000, Ammonia 208, will start Rifaximin, lactulose, Vitamin K x 3 doses, and FFP 2 units, check INR today, start Midodrine 5mg for hypotension, patient developed afib with RVR, started Metoprolol    Admit Date: 5/5/2020  LOS: 2    CC: Overdose    Code Status: Full Code     SUBJECTIVE:     Interval History/Significant Events: Rewarming started today 05/07. Patient had GI bleed overnight, has shock liver with worsening transaminitis AST and ALT markedly elevated at 14,433 and 9,383, on is on dialysis, hypocalcemia, Ammonia 208, will start Rifaximin, lactulose, Vitamin K x 3 doses, and FFP 2 units, check INR today, start Midodrine 5mg Patient developed afib with RVR, started metoprolol      Review of Symptoms: ROS unable to be completed, patient unresponsive, intubated    Constitutional: Negative for fevers or chills.  Pulmonary:intubated   Cardiology: Negative for chest pain or palpitations.  GI: Negative for abdominal pain or constipation.  Neurologic: Negative for new weakness,  headaches, or paresthesias.     Medications:  Continuous Infusions:   sodium chloride 0.9% 200 mL/hr at 05/07/20 1400    dextrose 10 % in water (D10W) 20 mL/hr at 05/07/20 1305    norepinephrine bitartrate-D5W 0.7 mcg/kg/min (05/07/20 1357)    pantoprozole (PROTONIX) IV infusion 8 mg/hr (05/07/20 1305)    vasopressin (PITRESSIN) infusion 0.04 Units/min (05/07/20 1305)     Scheduled Meds:   ampicillin-sulbactim (UNASYN) IVPB  3 g Intravenous Q6H    chlorhexidine  15 mL Mouth/Throat BID    lactulose  10 g Per NG tube TID    midodrine  5 mg Per NG tube Q8H    phytonadione ((AQUA-MEPHYTON) IVPB  5 mg Intravenous Daily    rifAXImin  550 mg Per OG tube BID     PRN Meds:.sodium chloride, Dextrose 10% Bolus, Dextrose 10% Bolus, glucagon (human recombinant), insulin aspart U-100, magnesium sulfate IVPB, ondansetron, sodium phosphate IVPB, sodium phosphate IVPB, sodium phosphate IVPB    OBJECTIVE:   Vital Signs (Most Recent):   Temp: (!) 93.6 °F (34.2 °C) (05/07/20 1400)  Pulse: (!) 111 (05/07/20 1400)  Resp: (!) 0 (05/07/20 1400)  BP: (!) 100/59 (05/07/20 1300)  SpO2: 100 % (05/07/20 1400)    Vital Signs (24h Range):   Temp:  [91 °F (32.8 °C)-93.6 °F (34.2 °C)] 93.6 °F (34.2 °C)  Pulse:  [] 111  Resp:  [0-33] 0  SpO2:  [94 %-100 %] 100 %  BP: (100-148)/(57-89) 100/59  Arterial Line BP: ()/(54-74) 100/64    ICP/CPP (Last 24h):        I & O (Last 24h):     Intake/Output Summary (Last 24 hours) at 5/7/2020 1408  Last data filed at 5/7/2020 1400  Gross per 24 hour   Intake 9192.92 ml   Output 6388 ml   Net 2804.92 ml     Physical Exam:  GA: intubated comfortable, no acute distress.   HEENT: No scleral icterus or JVD.   Pulmonary: Intubated Clear to auscultation A/P/L. No wheezing, crackles, or rhonchi.  Cardiac: RRR S1 & S2 w/o rubs/murmurs/gallops.   Abdominal: Bowel sounds present x 4. No appreciable hepatosplenomegaly.  Skin: No jaundice, rashes, or visible lesions.  Neuro:  --GCS: E1 VT M1  --Mental  Status:  Unresponsive  --CN II-XII grossly intact.   --Pupils 3 mm, fixed non reactive.   --Corneal reflex, gag, cough intact.  --LUE strength: 1/5  --RUE strength: 1/5  --LLE strength: 1/5  --RLE strength: 1/5    Vent Data:   Vent Mode: A/C  Oxygen Concentration (%):  [50] 50  Resp Rate Total:  [28 br/min-32 br/min] 32 br/min  Vt Set:  [500 mL-530 mL] 530 mL  PEEP/CPAP:  [12 cmH20] 12 cmH20  Mean Airway Pressure:  [19 ldC13-25 cmH20] 21 cmH20    Lines/Drains/Airway:        Airway - Non-Surgical Endotracheal Tube (Active)   Secured at 22 cm 5/6/2020  5:10 AM   Measured At Lips 5/6/2020  5:10 AM   Secured Location Left 5/6/2020  5:10 AM   Secured by Commercial tube macias 5/6/2020  5:10 AM   Bite Block none 5/6/2020  5:10 AM   Site Condition Cool;Dry 5/6/2020  5:10 AM   Status Intact;Secured;Patent 5/6/2020  5:10 AM   Site Assessment Clean;Dry;No bleeding;No drainage 5/6/2020  5:10 AM   Cuff Pressure 24 cm H2O 5/6/2020  5:10 AM      Percutaneous Central Line Insertion/Assessment - Quad Lumen  05/05/20 1845 right femoral vein (Active)   Verification by X-ray Yes 5/5/2020  7:05 PM   Site Assessment No drainage;No redness;No swelling;No warmth 5/5/2020  7:05 PM   Line Securement Device Secured with sutures 5/5/2020  7:05 PM   Dressing Type Biopatch in place;Central line dressing with pants 5/5/2020  7:05 PM   Dressing Status Clean;Dry;Intact 5/5/2020  7:05 PM   Dressing Intervention Integrity maintained 5/5/2020  7:05 PM   Date on Dressing 05/05/20 5/5/2020  7:05 PM   Dressing Due to be Changed 05/12/20 5/5/2020  7:05 PM   Distal Patency/Care blood return present;infusing 5/5/2020  7:05 PM   Medial 1 Patency/Care blood return present;normal saline lock 5/5/2020  7:05 PM   Medial 2 Patency/Care blood return present;normal saline lock 5/5/2020  7:05 PM   Proximal 1 Patency/Care blood return present;infusing 5/5/2020  7:05 PM   Line Necessity Review Hemodynamic instability 5/5/2020  7:05 PM       Trialysis (Dialysis)  Catheter 05/05/20 2143 left femoral (Active)           Arterial Line 05/05/20 2340 Left Brachial (Active)           NG/OG Tube 05/05/20 1200 Right nostril (Active)   Placement Check placement verified by aspirate characteristics 5/5/2020  7:05 PM   Tolerance no signs/symptoms of discomfort 5/5/2020  7:05 PM   Securement secured to nostril center 5/5/2020  7:05 PM   Clamp Status/Tolerance clamped;no emesis;no nausea;no residual;no restlessness 5/5/2020  7:05 PM   Suction Setting/Drainage Method suction at the bedside 5/5/2020  7:05 PM   Insertion Site Appearance no redness, warmth, tenderness, skin breakdown, drainage 5/5/2020  7:05 PM            Urethral Catheter 05/05/20 1751 Temperature probe (Active)   Site Assessment Clean;Intact 5/5/2020  7:05 PM   Collection Container Urimeter 5/5/2020  7:05 PM   Securement Method secured to top of thigh w/ adhesive device 5/5/2020  7:05 PM   Catheter Care Performed yes 5/5/2020  7:05 PM   Reason for Continuing Urinary Catheterization Critically ill in ICU requiring intensive monitoring 5/5/2020  7:05 PM   Output (mL) 15 mL 5/6/2020  7:05 AM       Trialysis (Dialysis) Catheter 05/05/20 2143 left femoral (Active)     Nutrition/Tube Feeds (if NPO state why): TF     Labs:  ABG:   Recent Labs   Lab 05/07/20  1126   PH 7.321*   PO2 150*   PCO2 51.8*   HCO3 26.7   POCSATURATED 99   BE 1     BMP:  Recent Labs   Lab 05/07/20  1122     139   K 4.3  4.3   CL 98  98   CO2 24  24   BUN 5*  5*   CREATININE 0.9  0.9   GLU 86  86   MG 2.0   PHOS 3.6     LFT:   Lab Results   Component Value Date    AST 13,665 (H) 05/07/2020    ALT 8,592 (H) 05/07/2020    ALKPHOS 121 05/07/2020    BILITOT 4.4 (H) 05/07/2020    ALBUMIN 2.6 (L) 05/07/2020    PROT 5.0 (L) 05/07/2020     CBC:   Lab Results   Component Value Date    WBC 20.04 (H) 05/07/2020    HGB 12.4 (L) 05/07/2020    HCT 39.8 (L) 05/07/2020     (H) 05/07/2020     (L) 05/07/2020     Microbiology x 7d:   Microbiology  Results (last 7 days)     Procedure Component Value Units Date/Time    Blood culture [[id number]] Collected:  05/07/20 1110    Order Status:  Sent Specimen:  Blood from Peripheral, Antecubital, Left Updated:  05/07/20 1231    Blood culture [[id number]] Collected:  05/07/20 1115    Order Status:  Sent Specimen:  Blood from Peripheral, Forearm, Left Updated:  05/07/20 1230    Culture, Respiratory with Gram Stain [[id number]] Collected:  05/07/20 0433    Order Status:  Sent Specimen:  Respiratory from Sputum, Induced Updated:  05/07/20 0553    Blood culture [[id number]] Collected:  05/05/20 1726    Order Status:  Completed Specimen:  Blood from Peripheral, Upper Arm, Left Updated:  05/06/20 2012     Blood Culture, Routine No Growth to date      No Growth to date    Blood culture [[id number]] Collected:  05/05/20 1715    Order Status:  Completed Specimen:  Blood from Peripheral, Wrist, Right Updated:  05/06/20 2012     Blood Culture, Routine No Growth to date      No Growth to date        Imaging: CXR   ET tube, enteric tube and esophageal probe appear unchanged.  Wires and tubing overlie the chest.    Interval placement of central catheter from an inferior approach with its tip overlying the mid right atrium.    Heart and lungs  appear unchanged when allowing for differences in technique and positioning.      Impression       Interval placement of central catheter from an inferior approach with its tip overlying the mid right atrium.         I personally reviewed the above image.    ASSESSMENT/PLAN:     Active Hospital Problems    Diagnosis    *Overdose    Hypotension    Lactic acidosis    Troponin level elevated    Transaminitis    Cardiac arrest - out of hospital    MARIA DEL ROSARIO (acute kidney injury)    Rhabdomyolysis    Shock    Acute respiratory failure with hypoxia and hypercapnia    ATN (acute tubular necrosis)    Hyperkalemia        Uninterrupted Critical Care/Counseling Time (not including procedures): 33  mins    Assessment/Plan:     Pulmonary  Acute respiratory failure with hypoxia and hypercapnia  Intubated    Vent Mode: A/C  Oxygen Concentration (%):  [50] 50  Resp Rate Total:  [32 br/min] 32 br/min  Vt Set:  [530 mL] 530 mL  PEEP/CPAP:  [10 plG58-59 cmH20] 10 cmH20  Mean Airway Pressure:  [20 seB97-48 cmH20] 20 cmH20    Cardiac/Vascular  Cardiac arrest - out of hospital  Patient being warmed to 37 degrees celsius  troponins x 3  EKG on 05/05/20  Vent. Rate : 082 BPM     Atrial Rate : 082 BPM     P-R Int : 170 ms          QRS Dur : 082 ms      QT Int : 450 ms       P-R-T Axes : 065 -22 074 degrees     QTc Int : 525 ms    Normal sinus rhythm  Nonspecific ST and T wave abnormality  Prolonged QT  Abnormal ECG    Repeat EKG today    Troponin level elevated  troponins elevated from 4 to 12, will trend x 3  EKG  · ECHO: Severely decreased left ventricular systolic function with severe global hypokinesis. The estimated ejection fraction is 20%.  · Moderately to severely reduced right ventricular systolic function.  · Grade I (mild) left ventricular diastolic dysfunction consistent with impaired relaxation.  · Mild tricuspid regurgitation.  · The estimated PA systolic pressure is at least 17 mmHg.  Mechanically ventilated; cannot use inferior caval vein diameter to estimate central venous pressure.    Hypotension  IVF bolus N/S 500cc  Continue with IVF  Hypotension not responsive to fluids due to hypothermia  Patient is now on three pressors  Epinephrine  Levophed  Vasopressin    Renal/  Hyperkalemia  On dialysis    ATN (acute tubular necrosis)  Continue dialysis    MARIA DEL ROSARIO (acute kidney injury)  Continue dialysis per nephrology    Lactic acidosis  Continue to trend lactic acid  Normal saline bolus  IVF 75cc/hr    GI  Transaminitis  transaminitis  AST and ALT markedly elevated at 14,433 and 9,383, hypocalcemia  CMP q12hrs      Orthopedic  Rhabdomyolysis  Continue dialysis per nephrology    Other  * Overdose  39 year old man  found down this morning by family s/p CPR transferred after cooling process begun 12 hours ago, Utox positive for Cocaine and Amphetamines,     Neuro:    Seizures/Epilepsy Monitoring Evaluation:  - Continue current management.  - Seizure Precautions  - Continue vEEG monitoring   -- CT Head pending  -- SBP goal MAP >65  --PT/OT/Speech    Pulmonary:   Acute Respiratory Failure   -- Daily CXR  -- Daily ABGs   Vent Mode: A/C  Oxygen Concentration (%):  [50] 50  Resp Rate Total:  [28 br/min-32 br/min] 32 br/min  Vt Set:  [500 mL-530 mL] 530 mL  PEEP/CPAP:  [12 cmH20] 12 cmH20  Mean Airway Pressure:  [19 lbY09-14 cmH20] 21 cmH20    Cardiac:   Hypotension  -- Continue to monitor BP   --MAP goal >65  -- 2D echo   -- On two pressors, Vasopressin 0.04 and Levophed 0.7 needing to go up on pressor requirements due to hemodialysis    Tachycardia   HR in the 170's sustained,   IV metoprolol 5mg given        Renal:   --Continue to monitor I/O  --Continue to monitor BUN/Cr  -- BUN 21; Creatinine 1.9  -- PO4 elevated patient on dialysis  -- follow labs CMP q12  -Patient continued on CRRT, replacing calcium through CRRT    ID:   -- Rewarming patient today to targeted temperature 37 degrees celsius  -- No leukocytosis   -- Continue ampicillin 3g Q6hrs    Hem/Onc:   --continue to monitor H/H       Endocrine: A1C 5.6  --Continue to monitor BG  -- Continue sliding scale  -- POCT q 6      Fluids/Electrolytes/Nutrition/GI:   - Continue TF  -- Continue to monitor and replace electrolytes accordingly    PPX   -PUD: Pantoprazole drip for GI bleed  -DVT: : heparin 5000 units q 8, TCD, SCD    Uninterrupted Critical Care/Counseling Time (not including procedures):  >50 min    Vladimir Rubin MD  Neurocritical Care  [facility]    -     Shock  Patient has Shock liver, transaminitis  AST and ALT markedly elevated at 14,433 and 9,383,  Ammonia elevated at 208, will start Rifaximin, lactulose,  INR  Vitamin K x 3 doses, and FFP  2 units, check INR today,   start Midodrine 5mg for hypotension, Patient developed afib with RVR, started metoprolol              The patient is being Prophylaxed for:  Venous Thromboembolism with: Mechanical or Chemical  Stress Ulcer with: PPI  Ventilator Pneumonia with: chlorhexidine oral care    Activity Orders          None        Full Code    Vladimir Rubin MD  Neurocritical Care fellow  Neurocritical care  [facility]

## 2020-05-07 NOTE — ASSESSMENT & PLAN NOTE
IVF bolus N/S 500cc  Continue with IVF  Hypotension not responsive to fluids due to hypothermia  Patient is now on three pressors  Epinephrine  Levophed  Vasopressin

## 2020-05-07 NOTE — PROGRESS NOTES
KIM Diaz notified of the following lab values: WBC: 17.9, H&H 12.3/39.7, platelets 133, Protime 32.2, INR 3.4, aPTT 42.6, Fibrinogen 78. Ordered to administer 1 dose cryo. No new orders. Will continue to monitor.

## 2020-05-07 NOTE — PROGRESS NOTES
Informed MD Mitchell of Ca 7.0. Per Mitchell, adjusted Ca (in regard to albumin) is okay, no new orders. Pt in 3K/3Ca bath on CRRT now. WCTM.

## 2020-05-07 NOTE — NURSING
KIM Diaz notified. Flexi placed for liquid stool; after placement noted deep red/brown watery stool with mucous threads totaling 500 ccs. Pt remains  hemodynamically stable at this time. Ordered to send off CBC, fibrinogen, aPTT, INR and occult stool now.   PA at bedside to assess. Protonix ordered and PA states ok to leave flexi in for now pending results. No additional orders. Will continue to monitor.

## 2020-05-07 NOTE — TREATMENT PLAN
[facility]  Gastroenterology Treatment Plan        Objective:     Vitals:    10/11/19 0622   BP: 110/60   Pulse:    Resp:    Temp:          Significant Labs:  Recent Labs   Lab 05/05/20  1714 05/06/20  0309 05/07/20  0145   HGB 14.0 12.8* 12.3*       Lab Results   Component Value Date    WBC 17.90 (H) 05/07/2020    HGB 12.3 (L) 05/07/2020    HCT 39.7 (L) 05/07/2020    MCV 99 (H) 05/07/2020     (L) 05/07/2020       Lab Results   Component Value Date     05/07/2020    K 5.6 (H) 05/07/2020     05/07/2020    CO2 17 (L) 05/07/2020    BUN 8 05/07/2020    CREATININE 1.2 05/07/2020    CALCIUM 6.7 (LL) 05/07/2020    ANIONGAP 15 05/07/2020    ESTGFRAFRICA >60.0 05/07/2020    EGFRNONAA >60.0 05/07/2020       Lab Results   Component Value Date    ALT 10,086 (H) 05/07/2020    AST 15,326 (H) 05/07/2020    ALKPHOS 127 05/07/2020    BILITOT 4.2 (H) 05/07/2020       Lab Results   Component Value Date    INR 3.4 (H) 05/07/2020    INR 2.1 (H) 05/05/2020       Significant Imaging:  Reviewed pertinent radiology findings.         Assessment/Plan:     Say Olivares is a 39 y.o. male admitted on 5/5 after being found down with cardiac arrest s/p CPR, for possible drug overdose. Patient is intubated, currently being managed for cardiogenic shock and multiorgan failure with acute liver injury, coagulopathy, renal failure requiring SLED. No history of cirrhosis per chart review. Patient has not been on PPI prophylaxis since admission. Received a call from managing team reporting 550 cc of coffee ground and dark NGT residual output, and flexi placed which showed red/brown watery stool with mucous. Vitally HR is 61, MAP is 91 on levophed and vasopressin, hb is 12.3 drop from 14 since admission, , INR 2.1 on admission up to 3.4 today, K 5.6, oliguria present, LFTS elevated, troponin 12, fibrinogen 78, covid testing on 5/5 X1 negative. Echo showed EF of 20%.     PPI has not been started,  cryoprecipitate and FFP has not been given yet, and repeat labs are pending. HR is not elevated, and pressor requirements have not increased since this episode of possible GI bleed. Suspect stress gastritis vs PUD from sepsis. Will recommend initial resuscitation with PPI, FFP to reverse INR, and to increase fibrinogen >100.     Plan:  -Place patient NPO  -Please obtain covid testing   -Place 2 large bore IVs, volume resuscitation per primary  -Transfuse pRBC for Hb < 7 g/dL (Consider a higher Hb target if there is clinical evidence of intravascular volume depletion or comorbidities, such as CAD or if high suspicion of vigorous active ongoing bleeding or an uncorrected coagulopathy exists.).  -Correct coagulopathy (goal plt >50, INR <1.5, fibrinogen >100) if present in patients without absolute contraindications.  -PPI 80 mg IV bolus once, then 8 mg/hour infusion   -Avoid nonsteroidal agents, antiplatelet agents and anticoagulants if possible in patients without absolute contraindications. (unless required for cardiovascular protection).  -Endoscopy to be determined after initial resuscitation.   -Please call with any additional questions, concerns or changes in the patient's clinical status.    High-quality evidence per [employer]    Discussed with attending staff physician of [facility] and GI staff physician Dr. August. Thank you for involving us in the care of Say Olivares. Please call with any additional questions, concerns or changes in the patient's clinical status.    Brian Brandon MD  Gastroenterology Fellow PGY IV   [facility]

## 2020-05-08 NOTE — PLAN OF CARE
POC reviewed with pt at 0500. Pt unable to verbalize understanding due to being intubated and unresponsive. Questions and concerns addressed with pts mother. Pt in sustained Afib RVR for 4 hours overnight. Scheduled lopressor given. 12 lead EKG obtained. Epi turned off @ 2300. Levo @ 1.7 mcg/kg/min. Vaso @ 0.04 units/hr. Protonix @ 8 mg/hr. D10 @ 20 ml/hr. Pupils remain 7 mm and fixed. CRRT continued overnight. Temperature goal of 37 C reached. Will continue to monitor. See flowsheets for full assessment and VS info

## 2020-05-08 NOTE — SUBJECTIVE & OBJECTIVE
Admit Date: 5/5/2020  LOS: 3    CC: Overdose    Code Status: Full Code     SUBJECTIVE:     Interval History/Significant Events: Rewarming started yesterday on started at 3 am on 05/08. Patient will need to be warmed for 24 hrs before he gets tested for brain death. Continue Rifaximin, lactulose, start steroids, patient on 4 pressors.      Review of Symptoms: ROS unable to be completed, patient unresponsive, intubated    Constitutional: Negative for fevers or chills.  Pulmonary:intubated   Cardiology: Negative for chest pain or palpitations.  GI: Negative for abdominal pain or constipation.  Neurologic: Negative for new weakness, headaches, or paresthesias.     Medications:  Continuous Infusions:   sodium chloride 0.9% 200 mL/hr at 05/08/20 1739    dextrose 10 % in water (D10W) 20 mL/hr at 05/08/20 1705    epinephrine Stopped (05/07/20 2305)    levothyroxine (SYNTHROID) IV syringe infusion (PICU) 10 mcg/hr (05/08/20 1041)    norepinephrine bitartrate-D5W 0.32 mcg/kg/min (05/08/20 1739)    pantoprozole (PROTONIX) IV infusion 8 mg/hr (05/08/20 1739)    vasopressin (PITRESSIN) infusion 0.08 Units/min (05/08/20 1739)     Scheduled Meds:   ampicillin-sulbactim (UNASYN) IVPB  3 g Intravenous Q6H    chlorhexidine  15 mL Mouth/Throat BID    hydrocortisone sodium succinate  100 mg Intravenous Q8H    lactulose  10 g Per NG tube TID    metoprolol tartrate  25 mg Per OG tube TID    midodrine  10 mg Per NG tube Q8H    niCARdipine        phytonadione ((AQUA-MEPHYTON) IVPB  5 mg Intravenous Daily    rifAXImin  550 mg Per OG tube BID     PRN Meds:.Dextrose 10% Bolus, Dextrose 10% Bolus, glucagon (human recombinant), insulin aspart U-100, magnesium sulfate IVPB, ondansetron, sodium phosphate IVPB, sodium phosphate IVPB, sodium phosphate IVPB, Pharmacy to dose Vancomycin consult **AND** vancomycin - pharmacy to dose    OBJECTIVE:   Vital Signs (Most Recent):   Temp: 98.6 °F (37 °C) (05/08/20 1705)  Pulse: 86 (05/08/20  1705)  Resp: (!) 0 (05/08/20 1705)  BP: 126/72 (05/08/20 1705)  SpO2: 98 % (05/08/20 1705)    Vital Signs (24h Range):   Temp:  [95.2 °F (35.1 °C)-98.8 °F (37.1 °C)] 98.6 °F (37 °C)  Pulse:  [] 86  Resp:  [0-32] 0  SpO2:  [85 %-100 %] 98 %  BP: ()/(54-90) 126/72  Arterial Line BP: ()/(53-81) 124/73    ICP/CPP (Last 24h):        I & O (Last 24h):     Intake/Output Summary (Last 24 hours) at 5/8/2020 1748  Last data filed at 5/8/2020 1739  Gross per 24 hour   Intake 9346.83 ml   Output 6778 ml   Net 2568.83 ml     Physical Exam:  GA: intubated comfortable, no acute distress.   HEENT: No scleral icterus or JVD.   Pulmonary: Intubated Clear to auscultation A/P/L. No wheezing, crackles, or rhonchi.  Cardiac: RRR S1 & S2 w/o rubs/murmurs/gallops.   Abdominal: Bowel sounds present x 4. No appreciable hepatosplenomegaly.  Skin: No jaundice, rashes, or visible lesions.  Neuro:  --GCS: E1 VT M1  --Mental Status:  Unresponsive  --CN II-XII grossly intact.   --Pupils 3 mm, fixed non reactive.   --Corneal reflex, gag, cough intact.  --LUE strength: 1/5  --RUE strength: 1/5  --LLE strength: 1/5  --RLE strength: 1/5    Vent Data:   Vent Mode: A/C  Oxygen Concentration (%):  [] 50  Resp Rate Total:  [32 br/min] 32 br/min  Vt Set:  [530 mL] 530 mL  PEEP/CPAP:  [10 cmH20] 10 cmH20  Mean Airway Pressure:  [15 vhY92-86 cmH20] 17 cmH20    Lines/Drains/Airway:        Airway - Non-Surgical Endotracheal Tube (Active)   Secured at 22 cm 5/6/2020  5:10 AM   Measured At Lips 5/6/2020  5:10 AM   Secured Location Left 5/6/2020  5:10 AM   Secured by Commercial tube macias 5/6/2020  5:10 AM   Bite Block none 5/6/2020  5:10 AM   Site Condition Cool;Dry 5/6/2020  5:10 AM   Status Intact;Secured;Patent 5/6/2020  5:10 AM   Site Assessment Clean;Dry;No bleeding;No drainage 5/6/2020  5:10 AM   Cuff Pressure 24 cm H2O 5/6/2020  5:10 AM      Percutaneous Central Line Insertion/Assessment - Quad Lumen  05/05/20 1845 right femoral  vein (Active)   Verification by X-ray Yes 5/5/2020  7:05 PM   Site Assessment No drainage;No redness;No swelling;No warmth 5/5/2020  7:05 PM   Line Securement Device Secured with sutures 5/5/2020  7:05 PM   Dressing Type Biopatch in place;Central line dressing with pants 5/5/2020  7:05 PM   Dressing Status Clean;Dry;Intact 5/5/2020  7:05 PM   Dressing Intervention Integrity maintained 5/5/2020  7:05 PM   Date on Dressing 05/05/20 5/5/2020  7:05 PM   Dressing Due to be Changed 05/12/20 5/5/2020  7:05 PM   Distal Patency/Care blood return present;infusing 5/5/2020  7:05 PM   Medial 1 Patency/Care blood return present;normal saline lock 5/5/2020  7:05 PM   Medial 2 Patency/Care blood return present;normal saline lock 5/5/2020  7:05 PM   Proximal 1 Patency/Care blood return present;infusing 5/5/2020  7:05 PM   Line Necessity Review Hemodynamic instability 5/5/2020  7:05 PM       Trialysis (Dialysis) Catheter 05/05/20 2143 left femoral (Active)           Arterial Line 05/05/20 2340 Left Brachial (Active)           NG/OG Tube 05/05/20 1200 Right nostril (Active)   Placement Check placement verified by aspirate characteristics 5/5/2020  7:05 PM   Tolerance no signs/symptoms of discomfort 5/5/2020  7:05 PM   Securement secured to nostril center 5/5/2020  7:05 PM   Clamp Status/Tolerance clamped;no emesis;no nausea;no residual;no restlessness 5/5/2020  7:05 PM   Suction Setting/Drainage Method suction at the bedside 5/5/2020  7:05 PM   Insertion Site Appearance no redness, warmth, tenderness, skin breakdown, drainage 5/5/2020  7:05 PM            Urethral Catheter 05/05/20 1751 Temperature probe (Active)   Site Assessment Clean;Intact 5/5/2020  7:05 PM   Collection Container Urimeter 5/5/2020  7:05 PM   Securement Method secured to top of thigh w/ adhesive device 5/5/2020  7:05 PM   Catheter Care Performed yes 5/5/2020  7:05 PM   Reason for Continuing Urinary Catheterization Critically ill in ICU requiring intensive  monitoring 5/5/2020  7:05 PM   Output (mL) 15 mL 5/6/2020  7:05 AM       Trialysis (Dialysis) Catheter 05/05/20 2143 left femoral (Active)     Nutrition/Tube Feeds (if NPO state why): TF     Labs:  ABG:   Recent Labs   Lab 05/08/20  1306   PH 7.448   PO2 172*   PCO2 36.9   HCO3 25.6   POCSATURATED 100   BE 2     BMP:  Recent Labs   Lab 05/08/20  1515   *   K 4.0   CL 99   CO2 22*   BUN 5*   CREATININE 0.9   GLU 93   MG 2.4   PHOS 1.9*     LFT:   Lab Results   Component Value Date    AST 4,000 (H) 05/08/2020    ALT 4,000 (H) 05/08/2020    ALKPHOS 164 (H) 05/08/2020    BILITOT 6.0 (H) 05/08/2020    ALBUMIN 2.2 (L) 05/08/2020    PROT 4.5 (L) 05/08/2020     CBC:   Lab Results   Component Value Date    WBC 25.28 (H) 05/08/2020    HGB 11.1 (L) 05/08/2020    HCT 34.7 (L) 05/08/2020    MCV 97 05/08/2020    PLT 77 (L) 05/08/2020     Microbiology x 7d:   Microbiology Results (last 7 days)     Procedure Component Value Units Date/Time    Blood culture [[id number]] Collected:  05/07/20 1110    Order Status:  Completed Specimen:  Blood from Peripheral, Antecubital, Left Updated:  05/08/20 1412     Blood Culture, Routine No Growth to date      No Growth to date    Blood culture [[id number]] Collected:  05/07/20 1115    Order Status:  Completed Specimen:  Blood from Peripheral, Forearm, Left Updated:  05/08/20 1412     Blood Culture, Routine No Growth to date      No Growth to date    Culture, Respiratory with Gram Stain [[id number]] Collected:  05/07/20 0433    Order Status:  Completed Specimen:  Respiratory from Sputum, Induced Updated:  05/08/20 0755     Respiratory Culture Normal respiratory steven     Gram Stain (Respiratory) <10 epithelial cells per low power field.     Gram Stain (Respiratory) Rare WBC's     Gram Stain (Respiratory) Few yeast     Gram Stain (Respiratory) Rare Gram positive rods     Gram Stain (Respiratory) Rare Gram negative rods    Blood culture [[id number]] Collected:  05/05/20 7026    Order Status:   Completed Specimen:  Blood from Peripheral, Upper Arm, Left Updated:  05/07/20 2012     Blood Culture, Routine No Growth to date      No Growth to date      No Growth to date    Blood culture [[id number]] Collected:  05/05/20 1715    Order Status:  Completed Specimen:  Blood from Peripheral, Wrist, Right Updated:  05/07/20 2012     Blood Culture, Routine No Growth to date      No Growth to date      No Growth to date        Imaging: CXR   ET tube, enteric tube and esophageal probe appear unchanged.  Wires and tubing overlie the chest.    Interval placement of central catheter from an inferior approach with its tip overlying the mid right atrium.    Heart and lungs  appear unchanged when allowing for differences in technique and positioning.      Impression       Interval placement of central catheter from an inferior approach with its tip overlying the mid right atrium.         I personally reviewed the above image.    ASSESSMENT/PLAN:     Active Hospital Problems    Diagnosis    *Overdose    Hypotension    Lactic acidosis    Troponin level elevated    Transaminitis    Cardiac arrest - out of hospital    MARIA DEL ROSARIO (acute kidney injury)    Rhabdomyolysis    Shock    Acute respiratory failure with hypoxia and hypercapnia    ATN (acute tubular necrosis)    Hyperkalemia        Uninterrupted Critical Care/Counseling Time (not including procedures): 33 mins

## 2020-05-08 NOTE — PROGRESS NOTES
[facility]  Neurocritical Care  Progress Note    Admit Date: 5/5/2020  Service Date: 05/08/2020  Length of Stay: 3    Subjective:     Chief Complaint: Overdose    History of Present Illness: Mr. Olivares is a 40 yo man with PMH of opioid abuse admitted as a transfer from [facility] for possible cocaine and amphetamine overdose. Patient has a hx of chronic back pain and was found down by his family at 6am this morning snoring on the couch, and when by 7:30 am his family realized he was unresponsive they called EMS.   CPR was performed by a bystander until EMS arrived and placed an Ambu® Spike LTS-D laryngeal tube.   Patient was in asystole when EMS got to the scene and was given given Epi  and 2 mg of Narcan. Patient then went into ventricular fibrillation, and was defibrillated x 1, and arrived in the ED in NSR.     Per transfer note, Patient was hypotensive after arrival to lowest of 51/41, then gradually went to 87/66, then 117/58 by 1pm.   Patient's pupils were found to be fixed and dilated with agonal respirations.  He had track marks to right AC.  He was subsequently intubated in the ED after arrival.   Patient was cooled for less than 12 hours prior to arrival, will continue for 24hrs     Upon Labs revealed elevated LA to 16, Cr of 2, K of 6.3.  His UDS was positive for amphetamines and cocaine.  COVID was negative at [facility], repeat COVID test pending.  CT chest showed debris/mass or secretion in the right proximal lower lobe bronchus along with intraluminal calcifications (?broncholiths).  Recommend bronchoscopy.  Right lower lobe atelectasis with shift of the mediastinum to the right.  Centrilobar emphysema with patchy right upper lobe pneumonia.  Smaller pneumonia in the left upper lobe along the fissure and significant bibasilar infiltrates c/w multifocal pneumonia.       He was given Na Bicarb iv x 4, had NGT and roque placed, Kayexalate 30g, 10 U insulin and CaGluconate.  He was  started on Levophed for the hypotension at 4 mamie/min.  He was given empiric Zosyn 4.5g iv x 1 and Zyvox 500mg iv x 1 after blood cultures.  His repeat K after above therapies was 5.7.     Patient was transferred to [facility] for higher level of care, Video EEG monitoring began, A-line and Femoral line placed. Will continue current cooling mgt.    Hospital Course: 05/05/2020: Patient admitted as a transfer from [facility] for possible amphetamine and cocaine overdose. Video EEG monitoring began, A-line and Femoral line placed. Will continue current cooling mgt  05/06/2020: Patient had bronchoscopy last night done by Dr. Andrade and had a cocaine rock taken out of his lungs.  05/07/2020: Patient had GI bleed overnight, GI consulted, recommended  PPI drip, has shock liver with worsening transaminitis AST and ALT markedly elevated at 14,433 and 9,383, on is on dialysis, hypocalcemia, Patient also has elevated CK at 40,000, Ammonia 208, will start Rifaximin, lactulose, Vitamin K x 3 doses, and FFP 2 units, check INR today, start Midodrine 5mg for hypotension, patient developed afib with RVR, started Metoprolol  05/08/2020: Patient started on steroids, on levophed, vasopressin and Epinephrine, patient no longer has any brain stem reflexes however he would have to be rewarmed at target temperature 37 degrees celsius for 24 hours.    Admit Date: 5/5/2020  LOS: 3    CC: Overdose    Code Status: Full Code     SUBJECTIVE:     Interval History/Significant Events: Rewarming started yesterday on patient at 3 am on 05/08. Patient will need to be warmed for 24 hrs before he gets tested for brain death. Continue Rifaximin, lactulose, start steroids, patient on 4 pressors.      Review of Symptoms: ROS unable to be completed, patient unresponsive, intubated    Constitutional: Negative for fevers or chills.  Pulmonary:intubated   Cardiology: Negative for chest pain or palpitations.  GI: Negative for abdominal pain  or constipation.  Neurologic: Negative for new weakness, headaches, or paresthesias.     Medications:  Continuous Infusions:   sodium chloride 0.9% 200 mL/hr at 05/08/20 1739    dextrose 10 % in water (D10W) 20 mL/hr at 05/08/20 1705    epinephrine Stopped (05/07/20 2305)    levothyroxine (SYNTHROID) IV syringe infusion (PICU) 10 mcg/hr (05/08/20 1041)    norepinephrine bitartrate-D5W 0.32 mcg/kg/min (05/08/20 1739)    pantoprozole (PROTONIX) IV infusion 8 mg/hr (05/08/20 1739)    vasopressin (PITRESSIN) infusion 0.08 Units/min (05/08/20 1739)     Scheduled Meds:   ampicillin-sulbactim (UNASYN) IVPB  3 g Intravenous Q6H    chlorhexidine  15 mL Mouth/Throat BID    hydrocortisone sodium succinate  100 mg Intravenous Q8H    lactulose  10 g Per NG tube TID    metoprolol tartrate  25 mg Per OG tube TID    midodrine  10 mg Per NG tube Q8H    niCARdipine        phytonadione ((AQUA-MEPHYTON) IVPB  5 mg Intravenous Daily    rifAXImin  550 mg Per OG tube BID     PRN Meds:.Dextrose 10% Bolus, Dextrose 10% Bolus, glucagon (human recombinant), insulin aspart U-100, magnesium sulfate IVPB, ondansetron, sodium phosphate IVPB, sodium phosphate IVPB, sodium phosphate IVPB, Pharmacy to dose Vancomycin consult **AND** vancomycin - pharmacy to dose    OBJECTIVE:   Vital Signs (Most Recent):   Temp: 98.6 °F (37 °C) (05/08/20 1705)  Pulse: 86 (05/08/20 1705)  Resp: (!) 0 (05/08/20 1705)  BP: 126/72 (05/08/20 1705)  SpO2: 98 % (05/08/20 1705)    Vital Signs (24h Range):   Temp:  [95.2 °F (35.1 °C)-98.8 °F (37.1 °C)] 98.6 °F (37 °C)  Pulse:  [] 86  Resp:  [0-32] 0  SpO2:  [85 %-100 %] 98 %  BP: ()/(54-90) 126/72  Arterial Line BP: ()/(53-81) 124/73    ICP/CPP (Last 24h):        I & O (Last 24h):     Intake/Output Summary (Last 24 hours) at 5/8/2020 1748  Last data filed at 5/8/2020 1739  Gross per 24 hour   Intake 9346.83 ml   Output 6778 ml   Net 2568.83 ml     Physical Exam:  GA: intubated comfortable,  no acute distress.   HEENT: No scleral icterus or JVD.   Pulmonary: Intubated Clear to auscultation A/P/L. No wheezing, crackles, or rhonchi.  Cardiac: RRR S1 & S2 w/o rubs/murmurs/gallops.   Abdominal: Bowel sounds present x 4. No appreciable hepatosplenomegaly.  Skin: No jaundice, rashes, or visible lesions.  Neuro:  --GCS: E1 VT M1  --Mental Status:  Unresponsive  --CN II-XII grossly intact.   --Pupils 3 mm, fixed non reactive.   --Corneal reflex, gag, cough intact.  --LUE strength: 1/5  --RUE strength: 1/5  --LLE strength: 1/5  --RLE strength: 1/5    Vent Data:   Vent Mode: A/C  Oxygen Concentration (%):  [] 50  Resp Rate Total:  [32 br/min] 32 br/min  Vt Set:  [530 mL] 530 mL  PEEP/CPAP:  [10 cmH20] 10 cmH20  Mean Airway Pressure:  [15 asD19-67 cmH20] 17 cmH20    Lines/Drains/Airway:        Airway - Non-Surgical Endotracheal Tube (Active)   Secured at 22 cm 5/6/2020  5:10 AM   Measured At Lips 5/6/2020  5:10 AM   Secured Location Left 5/6/2020  5:10 AM   Secured by Commercial tube macias 5/6/2020  5:10 AM   Bite Block none 5/6/2020  5:10 AM   Site Condition Cool;Dry 5/6/2020  5:10 AM   Status Intact;Secured;Patent 5/6/2020  5:10 AM   Site Assessment Clean;Dry;No bleeding;No drainage 5/6/2020  5:10 AM   Cuff Pressure 24 cm H2O 5/6/2020  5:10 AM      Percutaneous Central Line Insertion/Assessment - Quad Lumen  05/05/20 184 right femoral vein (Active)   Verification by X-ray Yes 5/5/2020  7:05 PM   Site Assessment No drainage;No redness;No swelling;No warmth 5/5/2020  7:05 PM   Line Securement Device Secured with sutures 5/5/2020  7:05 PM   Dressing Type Biopatch in place;Central line dressing with pants 5/5/2020  7:05 PM   Dressing Status Clean;Dry;Intact 5/5/2020  7:05 PM   Dressing Intervention Integrity maintained 5/5/2020  7:05 PM   Date on Dressing 05/05/20 5/5/2020  7:05 PM   Dressing Due to be Changed 05/12/20 5/5/2020  7:05 PM   Distal Patency/Care blood return present;infusing 5/5/2020  7:05 PM    Medial 1 Patency/Care blood return present;normal saline lock 5/5/2020  7:05 PM   Medial 2 Patency/Care blood return present;normal saline lock 5/5/2020  7:05 PM   Proximal 1 Patency/Care blood return present;infusing 5/5/2020  7:05 PM   Line Necessity Review Hemodynamic instability 5/5/2020  7:05 PM       Trialysis (Dialysis) Catheter 05/05/20 2143 left femoral (Active)           Arterial Line 05/05/20 2340 Left Brachial (Active)           NG/OG Tube 05/05/20 1200 Right nostril (Active)   Placement Check placement verified by aspirate characteristics 5/5/2020  7:05 PM   Tolerance no signs/symptoms of discomfort 5/5/2020  7:05 PM   Securement secured to nostril center 5/5/2020  7:05 PM   Clamp Status/Tolerance clamped;no emesis;no nausea;no residual;no restlessness 5/5/2020  7:05 PM   Suction Setting/Drainage Method suction at the bedside 5/5/2020  7:05 PM   Insertion Site Appearance no redness, warmth, tenderness, skin breakdown, drainage 5/5/2020  7:05 PM            Urethral Catheter 05/05/20 1751 Temperature probe (Active)   Site Assessment Clean;Intact 5/5/2020  7:05 PM   Collection Container Urimeter 5/5/2020  7:05 PM   Securement Method secured to top of thigh w/ adhesive device 5/5/2020  7:05 PM   Catheter Care Performed yes 5/5/2020  7:05 PM   Reason for Continuing Urinary Catheterization Critically ill in ICU requiring intensive monitoring 5/5/2020  7:05 PM   Output (mL) 15 mL 5/6/2020  7:05 AM       Trialysis (Dialysis) Catheter 05/05/20 2143 left femoral (Active)     Nutrition/Tube Feeds (if NPO state why): TF     Labs:  ABG:   Recent Labs   Lab 05/08/20  1306   PH 7.448   PO2 172*   PCO2 36.9   HCO3 25.6   POCSATURATED 100   BE 2     BMP:  Recent Labs   Lab 05/08/20  1515   *   K 4.0   CL 99   CO2 22*   BUN 5*   CREATININE 0.9   GLU 93   MG 2.4   PHOS 1.9*     LFT:   Lab Results   Component Value Date    AST 4,000 (H) 05/08/2020    ALT 4,000 (H) 05/08/2020    ALKPHOS 164 (H) 05/08/2020    [facility] 05/08/2020    ALBUMIN 2.2 (L) 05/08/2020    PROT 4.5 (L) 05/08/2020     CBC:   Lab Results   Component Value Date    WBC 25.28 (H) 05/08/2020    HGB 11.1 (L) 05/08/2020    HCT 34.7 (L) 05/08/2020    MCV 97 05/08/2020    PLT 77 (L) 05/08/2020     Microbiology x 7d:   Microbiology Results (last 7 days)     Procedure Component Value Units Date/Time    Blood culture [[id number]] Collected:  05/07/20 1110    Order Status:  Completed Specimen:  Blood from Peripheral, Antecubital, Left Updated:  05/08/20 1412     Blood Culture, Routine No Growth to date      No Growth to date    Blood culture [[id number]] Collected:  05/07/20 1115    Order Status:  Completed Specimen:  Blood from Peripheral, Forearm, Left Updated:  05/08/20 1412     Blood Culture, Routine No Growth to date      No Growth to date    Culture, Respiratory with Gram Stain [[id number]] Collected:  05/07/20 0433    Order Status:  Completed Specimen:  Respiratory from Sputum, Induced Updated:  05/08/20 0755     Respiratory Culture Normal respiratory steven     Gram Stain (Respiratory) <10 epithelial cells per low power field.     Gram Stain (Respiratory) Rare WBC's     Gram Stain (Respiratory) Few yeast     Gram Stain (Respiratory) Rare Gram positive rods     Gram Stain (Respiratory) Rare Gram negative rods    Blood culture [[id number]] Collected:  05/05/20 1726    Order Status:  Completed Specimen:  Blood from Peripheral, Upper Arm, Left Updated:  05/07/20 2012     Blood Culture, Routine No Growth to date      No Growth to date      No Growth to date    Blood culture [[id number]] Collected:  05/05/20 1715    Order Status:  Completed Specimen:  Blood from Peripheral, Wrist, Right Updated:  05/07/20 2012     Blood Culture, Routine No Growth to date      No Growth to date      No Growth to date        Imaging: CXR   ET tube, enteric tube and esophageal probe appear unchanged.  Wires and tubing overlie the chest.    Interval placement of central catheter from an  inferior approach with its tip overlying the mid right atrium.    Heart and lungs  appear unchanged when allowing for differences in technique and positioning.      Impression       Interval placement of central catheter from an inferior approach with its tip overlying the mid right atrium.         I personally reviewed the above image.    ASSESSMENT/PLAN:     Active Hospital Problems    Diagnosis    *Overdose    Hypotension    Lactic acidosis    Troponin level elevated    Transaminitis    Cardiac arrest - out of hospital    MARIA DEL ROSARIO (acute kidney injury)    Rhabdomyolysis    Shock    Acute respiratory failure with hypoxia and hypercapnia    ATN (acute tubular necrosis)    Hyperkalemia        Uninterrupted Critical Care/Counseling Time (not including procedures): 33 mins    Assessment/Plan:     Pulmonary  Acute respiratory failure with hypoxia and hypercapnia  Intubated    Vent Mode: A/C  Oxygen Concentration (%):  [] 50  Resp Rate Total:  [32 br/min] 32 br/min  Vt Set:  [530 mL] 530 mL  PEEP/CPAP:  [10 cmH20] 10 cmH20  Mean Airway Pressure:  [15 naR31-64 cmH20] 17 cmH20    Cardiac/Vascular  Cardiac arrest - out of hospital  Patient being warmed to 37 degrees celsius  troponins x 3  EKG on 05/05/20  Vent. Rate : 082 BPM     Atrial Rate : 082 BPM     P-R Int : 170 ms          QRS Dur : 082 ms      QT Int : 450 ms       P-R-T Axes : 065 -22 074 degrees     QTc Int : 525 ms    Normal sinus rhythm  Nonspecific ST and T wave abnormality  Prolonged QT  Abnormal ECG    Repeat EKG today    Troponin level elevated  troponins elevated from 4 to 12, will trend x 3  EKG  · ECHO: Severely decreased left ventricular systolic function with severe global hypokinesis. The estimated ejection fraction is 20%.  · Moderately to severely reduced right ventricular systolic function.  · Grade I (mild) left ventricular diastolic dysfunction consistent with impaired relaxation.  · Mild tricuspid regurgitation.  · The estimated  PA systolic pressure is at least 17 mmHg.  Mechanically ventilated; cannot use inferior caval vein diameter to estimate central venous pressure.    Hypotension  IVF bolus N/S 500cc  Continue with IVF  Hypotension not responsive to fluids due to hypothermia  Patient is now on three pressors  Epinephrine  Levophed  Vasopressin    Renal/  ATN (acute tubular necrosis)  Continue dialysis    MARIA DEL ROSARIO (acute kidney injury)  Continue dialysis per nephrology    Lactic acidosis  Continue to trend lactic acid  Normal saline bolus  IVF 75cc/hr    Hyperkalemia-resolved as of 5/8/2020  On dialysis    GI  Transaminitis  transaminitis  AST and ALT markedly elevated at 14,433 and 9,383, hypocalcemia  CMP q12hrs      Orthopedic  Rhabdomyolysis  Continue dialysis per nephrology    Other  * Overdose  39 year old man found down this morning by family s/p CPR transferred after cooling process begun 12 hours ago, Utox positive for Cocaine and Amphetamines,     Neuro:    Seizures/Epilepsy Monitoring Evaluation:  - Continue current management.  - Seizure Precautions  - Continue vEEG monitoring   -- CT Head pending  -- SBP goal MAP >65  --PT/OT/Speech    Pulmonary:   Acute Respiratory Failure   -- Daily CXR  -- Daily ABGs   Vent Mode: A/C  Oxygen Concentration (%):  [] 50  Resp Rate Total:  [32 br/min] 32 br/min  Vt Set:  [530 mL] 530 mL  PEEP/CPAP:  [10 cmH20] 10 cmH20  Mean Airway Pressure:  [15 qsI53-86 cmH20] 17 cmH20    Cardiac:   Hypotension  -- Continue to monitor BP   --MAP goal >65  -- 2D echo   -- On two pressors, Vasopressin 0.04 and Levophed 0.7 needing to go up on pressor requirements due to hemodialysis added epinephrine    Tachycardia   HR in the 170's sustained,   IV metoprolol 5mg given        Renal:   --Continue to monitor I/O  --Continue to monitor BUN/Cr  -- BUN 5; Creatinine 0.9  -- PO4 elevated patient on dialysis  -- follow labs CMP q12  -Patient continued on CRRT, replacing calcium through CRRT    ID:   --  Rewarming patient today to targeted temperature 37 degrees celsius  -- No leukocytosis   -- Continue ampicillin 3g Q6hrs    Hem/Onc:   --continue to monitor H/H       Endocrine: A1C 5.6  --Continue to monitor BG  -- Continue sliding scale  -- POCT q 6      Fluids/Electrolytes/Nutrition/GI:   - Continue TF  -- Continue to monitor and replace electrolytes accordingly    PPX   -PUD: Pantoprazole drip for GI bleed  -DVT: : heparin 5000 units q 8, TCD, SCD    Rewarming started at 3 am on 05/08. Patient will need to be warmed for 24 hrs before he gets tested for brain death. Continue Rifaximin, lactulose, start steroids, patient on 3 pressors.      Uninterrupted Critical Care/Counseling Time (not including procedures):  >50 min    Vladimir Rubin MD  Neurocritical Care  [facility]    -     Shock  Patient has Shock liver, transaminitis  AST and ALT markedly elevated at 14,433 and 9,383,  Ammonia elevated at 208, will start Rifaximin, lactulose,  INR  Vitamin K x 3 doses, and FFP 2 units, check INR today,   start Midodrine 5mg for hypotension, Patient developed afib with RVR, started metoprolol              The patient is being Prophylaxed for:  Venous Thromboembolism with: Mechanical or Chemical  Stress Ulcer with: PPI  Ventilator Pneumonia with: chlorhexidine oral care    Activity Orders          None        Full Code    Vladimir Rubin MD  Neurocritical Care Fellow  Neurocritical Care  [facility]

## 2020-05-08 NOTE — PLAN OF CARE
POC reviewed with pt and family at 1400. Pt mother verbalized understanding, questions and concerns addressed. Pt warmed to 37C as of 0300 this am, neuro exam unchanged- still no reflexes or pupillary reaction. MAP > 65 maintained, vaso at 0.08 u/min, levo at 0.56 mcg/kg/min. Pt in NSR, no afib today. Vent at 50%, PEEP 10, ETT inserted additional 2 cm, now 25 at the lip. Coffee emesis from NGT, bloody stool from Flexi. Levothyroxine gtt initiated. Mg and Phos replaced. Pt progressing toward goals. Will continue to monitor. See flowsheets for full assessment and VS info.

## 2020-05-08 NOTE — PROGRESS NOTES
1820: pt 's-180's. EKG done, shows afib w/RVR. Per MD Vladimir, give 5 mg lopressor IVP.    1830: after lopressor, pt still in afib, rate 110's. Scheduled 25 mg PO metoprolol ordered. Troponin, CK, CBC, BMP, PT/INR, Mg, Phos sent off. MAP > 65. Will continue to monitor closely.

## 2020-05-08 NOTE — ASSESSMENT & PLAN NOTE
Intubated    Vent Mode: A/C  Oxygen Concentration (%):  [] 50  Resp Rate Total:  [32 br/min] 32 br/min  Vt Set:  [530 mL] 530 mL  PEEP/CPAP:  [10 cmH20] 10 cmH20  Mean Airway Pressure:  [15 dfN46-71 cmH20] 17 cmH20

## 2020-05-08 NOTE — PROGRESS NOTES
Nephrology Progress Note    Patient followed for Taya  Patient was seen on RRT / SLED which was running for uremic toxin clearance / UF for volume management.    Vitals:    05/08/20 0905   BP: 122/61   Pulse: 84   Resp: (!) 0   Temp: 98.6 °F (37 °C)       CMP  Sodium   Date Value Ref Range Status   05/08/2020 136 136 - 145 mmol/L Final     Potassium   Date Value Ref Range Status   05/08/2020 3.6 3.5 - 5.1 mmol/L Final     Chloride   Date Value Ref Range Status   05/08/2020 99 95 - 110 mmol/L Final     CO2   Date Value Ref Range Status   05/08/2020 24 23 - 29 mmol/L Final     Glucose   Date Value Ref Range Status   05/08/2020 73 70 - 110 mg/dL Final     BUN, Bld   Date Value Ref Range Status   05/08/2020 5 (L) 6 - 20 mg/dL Final     Creatinine   Date Value Ref Range Status   05/08/2020 0.9 0.5 - 1.4 mg/dL Final     Calcium   Date Value Ref Range Status   05/08/2020 8.0 (L) 8.7 - 10.5 mg/dL Final     Total Protein   Date Value Ref Range Status   05/08/2020 4.7 (L) 6.0 - 8.4 g/dL Final     Albumin   Date Value Ref Range Status   05/08/2020 2.3 (L) 3.5 - 5.2 g/dL Final     Total Bilirubin   Date Value Ref Range Status   05/08/2020 5.4 (H) 0.1 - 1.0 mg/dL Final     Comment:     For infants and newborns, interpretation of results should be based  on gestational age, weight and in agreement with clinical  observations.  Premature Infant recommended reference ranges:  Up to 24 hours.............<8.0 mg/dL  Up to 48 hours............<12.0 mg/dL  3-5 days..................<15.0 mg/dL  6-29 days.................<15.0 mg/dL       Alkaline Phosphatase   Date Value Ref Range Status   05/08/2020 159 (H) 55 - 135 U/L Final     AST   Date Value Ref Range Status   05/08/2020 9,385 (H) 10 - 40 U/L Final     ALT   Date Value Ref Range Status   05/08/2020 7,672 (H) 10 - 44 U/L Final     Anion Gap   Date Value Ref Range Status   05/08/2020 13 8 - 16 mmol/L Final     eGFR if    Date Value Ref Range Status   05/08/2020 >60.0  >60 mL/min/1.73 m^2 Final     eGFR if non    Date Value Ref Range Status   05/08/2020 >60.0 >60 mL/min/1.73 m^2 Final     Comment:     Calculation used to obtain the estimated glomerular filtration  rate (eGFR) is the CKD-EPI equation.          CBC  Lab Results   Component Value Date    WBC 25.28 (H) 05/08/2020    HGB 11.1 (L) 05/08/2020    HCT 34.7 (L) 05/08/2020    MCV 97 05/08/2020    PLT 77 (L) 05/08/2020         Intake/Output Summary (Last 24 hours) at 5/8/2020 0951  Last data filed at 5/8/2020 0937  Gross per 24 hour   Intake 9286.16 ml   Output 5326 ml   Net 3960.16 ml     Assessment/Plan:      * Overdose  Per primary team     TAYA (acute kidney injury)  Likely ischemic ATN from hypotension and cardiac arrest on 5/5/2020     Plan:  - Patient hemodynamically not stable for intermittent HD yet.  - Will continue RRT / SLED for volume and toxin clearance.   - Attempted to increase UF yesterday but patient's vasopressor requirements increased; will attempt to increase UF as patient significantly positive  - Patient rewarmed by primary team; awaiting 24 hrs of rewarming prior to additional clinical studies   - SLED prescription and dialysate baths were reviewed and changes made according to most recent labs.   - UF adjusted to 300-400 mL/hr as tolerated, keep MAP >65, goal for slight negative in the next 24 hrs  - Follow labs serially while on RRT / SLED to monitor electrolytes and follow replacement protocol as needed.   - Continues anuric  - Metabolic acidosis likely from shock causing lactic acidosis, as evidenced by elevated liver enzymes, Taya, and elevated troponins; will continue on bicarb 40 bath  - Strict I/Os and chart  - MAP >65  - Avoid nephrotoxic agents, NSAIDs, IV contrast, etc  - Renal US  - U/A, UPCr (when and if patient produces any urine)  - Renal function panel per RRt protocol  - Will continue to follow closely     Acute respiratory failure with hypoxia and hypercapnia  Per  primary team     Cardiac arrest - out of hospital  Per primary team     Shock  On vasopressors by primary team         Thank you for your consult. I will follow-up with patient. Please contact us if you have any additional questions.    Luis Armando Boo MD  Nephrology  [facility]

## 2020-05-08 NOTE — TREATMENT PLAN
GI Treatment Plan    Interval History  Dark maroon liquid output in FlexiSeal bag. 200cc coffee ground output from NG.  Hgb 12.4 -> 11.1 in 24hrs. INR 2.9. S/p 2U FFP and 1U Cryo yesterday.  Still on levo and vaso. Was on epi overnight, but off this morning.    Plan  Upper GI bleed in setting of multi-organ failure with DIC/coaguloapthy.  - Continue correction of coagulopathy (goal plt >50, INR <1.5, fibrinogen >100)  - Continue IV PPI gtt  - Avoid NG tube to suction. Very likely there is some NG / FlexiSeal trauma.  - No role for urgent endoscopy at this time. Continue to monitor Hgb. Risks of endoscopy in setting of severe coagulopathy include causing more bleeding / trauma, and would hold off for now.    Thank you for involving us in the care of Say Olivares. Please call with any additional questions, concerns or changes in the patient's clinical status.      Caleb Concepcion MD  Gastroenterology Fellow, PGY4  [facility]

## 2020-05-08 NOTE — PROGRESS NOTES
"Pharmacokinetic Initial Assessment: IV Vancomycin    Assessment/Plan:  · Pharmacy consulted to dose vanc for "sepsis"   · Initiate intravenous vancomycin with 1250 mg x1 dose   · Pt w/ MARIA DEL ROSARIO currently on continuous SLED  · Desired empiric serum trough concentration is 10 to 20 mcg/mL  · Draw vancomycin random level on 5/8 at 2130. Follow nephrology plans and re-dose if < 20 mcg/mL   · Pharmacy will continue to follow and monitor vancomycin.      Please contact pharmacy at extension [phone] with any questions regarding this assessment.     Thank you for the consult,   Pat Hill       Patient brief summary:  Say Olivares is a 39 y.o. male initiated on antimicrobial therapy with IV Vancomycin for treatment of suspected sepsis    Drug Allergies:   Review of patient's allergies indicates:  No Known Allergies    Actual Body Weight:   89.4 kg    Renal Function:   Estimated Creatinine Clearance: 119.8 mL/min (based on SCr of 0.9 mg/dL).,     Dialysis Method (if applicable):  SLED    CBC (last 72 hours):  Recent Labs   Lab Result Units 05/05/20  1714 05/06/20  0309 05/07/20  0145 05/07/20  0523 05/07/20  1831   WBC K/uL 9.39 3.95 17.90* 20.04* 22.30*   Hemoglobin g/dL 14.0 12.8* 12.3* 12.4* 11.3*   Hemoglobin A1C % 5.6  --   --   --   --    Hematocrit % 46.7 40.8 39.7* 39.8* 36.5*   Platelets K/uL 275 240 133* 132* 98*   Gran% % 83.4* 50.3 86.0* 69.0 72.0   Lymph% % 12.6* 45.3 9.0* 12.0* 8.0*   Mono% % 2.6* 3.8* 3.0* 2.0* 2.0*   Eosinophil% % 0.2 0.0 0.0 0.0 0.0   Basophil% % 0.5 0.3 0.0 0.0 0.0   Differential Method  Automated Automated Manual Manual Manual       Metabolic Panel (last 72 hours):  Recent Labs   Lab Result Units 05/05/20  1714 05/05/20 2008 05/05/20  2150 05/06/20  0309 05/06/20  0750 05/06/20  1218 05/06/20  1445 05/06/20  1618 05/06/20  1958 05/06/20  2143 05/07/20  0003 05/07/20  0409 05/07/20  0750 05/07/20  1122 05/07/20  1303 05/07/20  1528 05/07/20  1831 05/07/20  2007   Sodium mmol/L 141 138 129* " 138  138 138 136 136 135* 137 137 138 136  136 139 139  139 138 138  138 139 139  139   Potassium mmol/L 4.9 5.4* 6.6* 4.9  4.9 4.8 6.1* 5.6* 5.4* 5.8* 5.5* 5.6* 4.6  4.6 4.8 4.3  4.3 3.9 3.7  3.7 3.7 3.6  3.6   Chloride mmol/L 108 106 101 104  104 105 105 106 105 105 106 106 106  106 102 98  98 98 99  99 100 101  101   CO2 mmol/L 15* 17* 16* 20*  20* 19* 17* 17* 18* 17* 17* 17* 15*  15* 22* 24  24 24 21*  21* 22* 22*  22*   Glucose mg/dL 34* 99 95 83  83 92 66* 66* 81 60* 64* 61* 84  84 68* 86  86 83 74  74 66* 68*  68*   BUN, Bld mg/dL 21* 23* 24* 24*  24* 21* 20 15 13 11 9 8 7  7 6 5*  5* 5* 5*  5* 5* 4*  4*   Creatinine mg/dL 1.9* 2.2* 2.1* 2.2*  2.2* 2.1* 2.0* 1.7* 1.5* 1.4 1.2 1.2 1.0  1.0 0.9 0.9  0.9 0.9 0.8  0.8 0.8 0.9  0.9   Albumin g/dL 2.7* 2.8* 2.1* 2.3*  2.3* 2.5* 2.5* 2.7* 2.5* 2.7* 2.6* 2.7* 2.3*  2.3* 2.5* 2.6* 2.6* 2.6*  --  2.6*   Total Bilirubin mg/dL 1.1* 1.5*  --  1.9* 2.7* 3.1*  --  3.5* 4.0*  --  4.2* 4.0* 4.5* 4.4*  --  5.0*  --  5.2*   Alkaline Phosphatase U/L 125 128  --  94 103 101  --  118 127  --  127 114 123 121  --  138*  --  142*   AST U/L 3,280* 4,258*  --  6,628* 8,897* 9,294*  --  12,501* 14,466*  --  15,326* 14,433* 15,515* 13,665*  --  14,175*  --  13,873*   ALT U/L 2,605* 3,510*  --  4,129* 5,330* 5,557*  --  8,056* 9,362*  --  10,086* 9,383* 9,773* 8,592*  --  9,063*  --  9,269*   Magnesium mg/dL 2.3  --  1.9 1.9  1.9 1.8 1.8 2.3 2.3 2.1 2.0 1.9 1.9  1.9 2.3 2.0 2.0 1.8 1.8 1.8   Phosphorus mg/dL 9.3*  --  7.3* 5.4*  5.4* 6.2* 5.5* 4.4 4.3 4.2 3.7 3.9 3.7  3.7 3.9 3.6 3.1 3.5 3.4 3.7       Drug levels (last 3 results):  No results for input(s): VANCOMYCINRA, VANCOMYCINPE, VANCOMYCINTR in the last 72 hours.    Microbiologic Results:  Microbiology Results (last 7 days)     Procedure Component Value Units Date/Time    Blood culture [[id number]] Collected:  05/05/20 6366    Order Status:  Completed Specimen:  Blood from Peripheral, Upper  Arm, Left Updated:  05/07/20 2012     Blood Culture, Routine No Growth to date      No Growth to date      No Growth to date    Blood culture [[id number]] Collected:  05/05/20 1715    Order Status:  Completed Specimen:  Blood from Peripheral, Wrist, Right Updated:  05/07/20 2012     Blood Culture, Routine No Growth to date      No Growth to date      No Growth to date    Blood culture [[id number]] Collected:  05/07/20 1110    Order Status:  Completed Specimen:  Blood from Peripheral, Antecubital, Left Updated:  05/07/20 1945     Blood Culture, Routine No Growth to date    Blood culture [[id number]] Collected:  05/07/20 1115    Order Status:  Completed Specimen:  Blood from Peripheral, Forearm, Left Updated:  05/07/20 1945     Blood Culture, Routine No Growth to date    Culture, Respiratory with Gram Stain [[id number]] Collected:  05/07/20 0433    Order Status:  Completed Specimen:  Respiratory from Sputum, Induced Updated:  05/07/20 1501     Gram Stain (Respiratory) <10 epithelial cells per low power field.     Gram Stain (Respiratory) Rare WBC's     Gram Stain (Respiratory) Few yeast     Gram Stain (Respiratory) Rare Gram positive rods     Gram Stain (Respiratory) Rare Gram negative rods

## 2020-05-08 NOTE — PROGRESS NOTES
Pharmacokinetic Assessment Follow Up: IV Vancomycin    Vancomycin serum concentration assessment(s):  · Vancomycin 1,250 mg x 1 administered yesterday at 2153.  · 10 hour random resulted at 14.7 mcg/mL.     Vancomycin Regimen Plan:  · Re-dose with 1,250 mg x 1 now.  · Continue to pulse dose while on SLED.  · Random level with AM labs on 5/9.    Drug levels (last 3 results):  Recent Labs   Lab Result Units 05/08/20  0737   Vancomycin, Random ug/mL 14.7       Pharmacy will continue to follow and monitor vancomycin. Please contact pharmacy for questions regarding this assessment.    Thank you for the consult,   Jeni Sanchez, PharmD, [facility]  Neurocritical Care Clinical Pharmacist  Spectralink: [id number]  ______________________________________________________________________________________________________       Patient brief summary:  Say Olivares is a 39 y.o. male initiated on antimicrobial therapy with IV Vancomycin for treatment of bacteremia    The patient's current regimen is pulse dosing by level.    Drug Allergies:   Review of patient's allergies indicates:  No Known Allergies    Actual Body Weight:   89.4 kg    Renal Function:   Estimated Creatinine Clearance: 119.8 mL/min (based on SCr of 0.9 mg/dL).,     Dialysis Method (if applicable):  SLED    CBC (last 72 hours):  Recent Labs   Lab Result Units 05/05/20  1714 05/06/20  0309 05/07/20  0145 05/07/20  0523 05/07/20  1831 05/08/20  0255   WBC K/uL 9.39 3.95 17.90* 20.04* 22.30* 25.28*   Hemoglobin g/dL 14.0 12.8* 12.3* 12.4* 11.3* 11.1*   Hemoglobin A1C % 5.6  --   --   --   --   --    Hematocrit % 46.7 40.8 39.7* 39.8* 36.5* 34.7*   Platelets K/uL 275 240 133* 132* 98* 77*   Gran% % 83.4* 50.3 86.0* 69.0 72.0 86.1*   Lymph% % 12.6* 45.3 9.0* 12.0* 8.0* 7.4*   Mono% % 2.6* 3.8* 3.0* 2.0* 2.0* 1.8*   Eosinophil% % 0.2 0.0 0.0 0.0 0.0 0.1   Basophil% % 0.5 0.3 0.0 0.0 0.0 0.4   Differential Method  Automated Automated Manual Manual Manual Automated        Metabolic Panel (last 72 hours):  Recent Labs   Lab Result Units 05/05/20  1714 05/05/20  2008 05/05/20  2150 05/06/20  0309 05/06/20  0750 05/06/20  1218 05/06/20  1445 05/06/20  1618 05/06/20  1958 05/06/20  2143 05/07/20  0003 05/07/20  0409 05/07/20  0750 05/07/20  1122 05/07/20  1303 05/07/20  1528 05/07/20  1831 05/07/20 2007 05/07/20  2224 05/08/20  0023 05/08/20  0255 05/08/20  0737   Sodium mmol/L 141 138 129* 138  138 138 136 136 135* 137 137 138 136  136 139 139  139 138 138  138 139 139  139 137 135* 134*  134* 136   Potassium mmol/L 4.9 5.4* 6.6* 4.9  4.9 4.8 6.1* 5.6* 5.4* 5.8* 5.5* 5.6* 4.6  4.6 4.8 4.3  4.3 3.9 3.7  3.7 3.7 3.6  3.6 3.5 3.6 3.5  3.5 3.6   Chloride mmol/L 108 106 101 104  104 105 105 106 105 105 106 106 106  106 102 98  98 98 99  99 100 101  101 99 98 98  98 99   CO2 mmol/L 15* 17* 16* 20*  20* 19* 17* 17* 18* 17* 17* 17* 15*  15* 22* 24  24 24 21*  21* 22* 22*  22* 20* 21* 21*  21* 24   Glucose mg/dL 34* 99 95 83  83 92 66* 66* 81 60* 64* 61* 84  84 68* 86  86 83 74  74 66* 68*  68* 82 84 77  77 73   BUN, Bld mg/dL 21* 23* 24* 24*  24* 21* 20 15 13 11 9 8 7  7 6 5*  5* 5* 5*  5* 5* 4*  4* 4* 5* 4*  4* 5*   Creatinine mg/dL 1.9* 2.2* 2.1* 2.2*  2.2* 2.1* 2.0* 1.7* 1.5* 1.4 1.2 1.2 1.0  1.0 0.9 0.9  0.9 0.9 0.8  0.8 0.8 0.9  0.9 0.9 0.9 0.9  0.9 0.9   Albumin g/dL 2.7* 2.8* 2.1* 2.3*  2.3* 2.5* 2.5* 2.7* 2.5* 2.7* 2.6* 2.7* 2.3*  2.3* 2.5* 2.6* 2.6* 2.6*  --  2.6* 2.4* 2.4* 2.4*  2.4* 2.3*   Total Bilirubin mg/dL 1.1* 1.5*  --  1.9* 2.7* 3.1*  --  3.5* 4.0*  --  4.2* 4.0* 4.5* 4.4*  --  5.0*  --  5.2*  --  5.1* 5.2* 5.4*   Alkaline Phosphatase U/L 125 128  --  94 103 101  --  118 127  --  127 114 123 121  --  138*  --  142*  --  137* 143* 159*   AST U/L 3,280* 4,258*  --  6,628* 8,897* 9,294*  --  12,501* 14,466*  --  15,326* 14,433* 15,515* 13,665*  --  14,175*  --  13,873*  --  11,746* 10,955* 9,385*   ALT U/L 2,605* 3,510*  --   4,129* 5,330* 5,557*  --  8,056* 9,362*  --  10,086* 9,383* 9,773* 8,592*  --  9,063*  --  9,269*  --  8,559* 8,371* 7,672*   Magnesium mg/dL 2.3  --  1.9 1.9  1.9 1.8 1.8 2.3 2.3 2.1 2.0 1.9 1.9  1.9 2.3 2.0 2.0 1.8 1.8 1.8 1.8 1.8 1.7  1.7 1.7   Phosphorus mg/dL 9.3*  --  7.3* 5.4*  5.4* 6.2* 5.5* 4.4 4.3 4.2 3.7 3.9 3.7  3.7 3.9 3.6 3.1 3.5 3.4 3.7 2.9 2.2* 1.8*  1.8* 1.8*       Vancomycin Administrations:  vancomycin given in the last 96 hours                   vancomycin 1.25 g in dextrose 5% 250 mL IVPB (ready to mix) (mg) 1,250 mg New Bag 05/07/20 2153                Microbiologic Results:  Microbiology Results (last 7 days)     Procedure Component Value Units Date/Time    Culture, Respiratory with Gram Stain [[id number]] Collected:  05/07/20 0433    Order Status:  Completed Specimen:  Respiratory from Sputum, Induced Updated:  05/08/20 0755     Respiratory Culture Normal respiratory steven     Gram Stain (Respiratory) <10 epithelial cells per low power field.     Gram Stain (Respiratory) Rare WBC's     Gram Stain (Respiratory) Few yeast     Gram Stain (Respiratory) Rare Gram positive rods     Gram Stain (Respiratory) Rare Gram negative rods    Blood culture [[id number]] Collected:  05/05/20 1726    Order Status:  Completed Specimen:  Blood from Peripheral, Upper Arm, Left Updated:  05/07/20 2012     Blood Culture, Routine No Growth to date      No Growth to date      No Growth to date    Blood culture [[id number]] Collected:  05/05/20 1715    Order Status:  Completed Specimen:  Blood from Peripheral, Wrist, Right Updated:  05/07/20 2012     Blood Culture, Routine No Growth to date      No Growth to date      No Growth to date    Blood culture [[id number]] Collected:  05/07/20 1110    Order Status:  Completed Specimen:  Blood from Peripheral, Antecubital, Left Updated:  05/07/20 1945     Blood Culture, Routine No Growth to date    Blood culture [[id number]] Collected:  05/07/20 1115    Order Status:   Completed Specimen:  Blood from Peripheral, Forearm, Left Updated:  05/07/20 1945     Blood Culture, Routine No Growth to date

## 2020-05-08 NOTE — ASSESSMENT & PLAN NOTE
39 year old man found down this morning by family s/p CPR transferred after cooling process begun 12 hours ago, Utox positive for Cocaine and Amphetamines,     Neuro:    Seizures/Epilepsy Monitoring Evaluation:  - Continue current management.  - Seizure Precautions  - Continue vEEG monitoring   -- CT Head pending  -- SBP goal MAP >65  --PT/OT/Speech    Pulmonary:   Acute Respiratory Failure   -- Daily CXR  -- Daily ABGs   Vent Mode: A/C  Oxygen Concentration (%):  [] 50  Resp Rate Total:  [32 br/min] 32 br/min  Vt Set:  [530 mL] 530 mL  PEEP/CPAP:  [10 cmH20] 10 cmH20  Mean Airway Pressure:  [15 hoB40-91 cmH20] 17 cmH20    Cardiac:   Hypotension  -- Continue to monitor BP   --MAP goal >65  -- 2D echo   -- On two pressors, Vasopressin 0.04 and Levophed 0.7 needing to go up on pressor requirements due to hemodialysis added epinephrine    Tachycardia   HR in the 170's sustained,   IV metoprolol 5mg given        Renal:   --Continue to monitor I/O  --Continue to monitor BUN/Cr  -- BUN 5; Creatinine 0.9  -- PO4 elevated patient on dialysis  -- follow labs CMP q12  -Patient continued on CRRT, replacing calcium through CRRT    ID:   -- Rewarming patient today to targeted temperature 37 degrees celsius  -- No leukocytosis   -- Continue ampicillin 3g Q6hrs    Hem/Onc:   --continue to monitor H/H       Endocrine: A1C 5.6  --Continue to monitor BG  -- Continue sliding scale  -- POCT q 6      Fluids/Electrolytes/Nutrition/GI:   - Continue TF  -- Continue to monitor and replace electrolytes accordingly    PPX   -PUD: Pantoprazole drip for GI bleed  -DVT: : heparin 5000 units q 8, TCD, SCD    Rewarming started at 3 am on 05/08. Patient will need to be warmed for 24 hrs before he gets tested for brain death. Continue Rifaximin, lactulose, start steroids, patient on 3 pressors.      Uninterrupted Critical Care/Counseling Time (not including procedures):  >50 min    Vladimir Rubin MD  Neurocritical Care  [facility]    -

## 2020-05-08 NOTE — CARE UPDATE
ETT advanced 2 cm from 23 to 25 per Dr. Cristobal @ the beginning of the shift without difficulty.

## 2020-05-08 NOTE — PROGRESS NOTES
[facility]  Adult Nutrition  Progress Note    SUMMARY       Recommendations    1.) Initiate enteral nutrition within 36hr: suggest Peptamen Intense VHP @ 20mL/hr advancing 10mL q4h to goal rate 60mL/hr to provide 1440 kcals, 132gm protein and 1210mL of free water.    MD to manage fluid.    If GRV >500mL, hold TF q4h then restart regimen.    TF to meet 94% EEN and 106% EPN.   2.) If able to extubate, ADAT to regular; texture per SLP.   3.) Suggest thiamine, folic acid, and MVI.   4.) Daily weights    Goals: 1.) Pt to receive nutrition by follow up.   Nutrition Goal Status: progressing towards goal  Communication of RD Recs: reviewed with RN    Reason for Assessment    Reason For Assessment: RD follow-up  Diagnosis: other (see comments)(overdose)  Relevant Medical History: substance abuse  Interdisciplinary Rounds: did not attend  General Information Comments: Pt remains intubated, no sedation, SLED in place with no plans of nutrition at this time. Pt in multi-organ failure with brain death studies being performed. No GI distress at this time. +GIB with NGT to ULEO=164gO output. Noted no new wt x2 days. Will monitor. Pt at risk for acute malnutrition. Due to recent hospital wide restrictions to limit the transfer of (COVID-19), we are not performing any physical exams at this time. All S/S will be observational; NFPE to be performed at a future date.    Nutrition Discharge Planning: Unable to determine at this time.     Nutrition Risk Screen    Nutrition Risk Screen: no indicators present    Nutrition/Diet History    Food Allergies: NKFA  Factors Affecting Nutritional Intake: NPO, on mechanical ventilation    Anthropometrics    Temp: 98.8 °F (37.1 °C)  Height Method: Estimated  Height: 6' (182.9 cm)  Height (inches): 72 in  Weight Method: Bed Scale  Weight: 89.4 kg (197 lb)  Weight (lb): 197 lb  Ideal Body Weight (IBW), Male: 178 lb  % Ideal Body Weight, Male (lb): 110.67 %  BMI (Calculated):  26.7  BMI Grade: 25 - 29.9 - overweight       Lab/Procedures/Meds    Pertinent Labs Reviewed: reviewed  Pertinent Labs Comments: Na 134, CO2 21, BUN 4, Ca 8.0, Phos 1.8, albumin 2.4, , ammonia 208, bili 5.2, AST 29473, ALT 8371  Pertinent Medications Reviewed: reviewed  Pertinent Medications Comments: lactulose, vancomycin D10, pressor x3    Estimated/Assessed Needs    Weight Used For Calorie Calculations: 89.4 kg (197 lb 1.5 oz)  Energy Calorie Requirements (kcal): 2169  Energy Need Method: [facility]  Protein Requirements: 107-125(g/day)  Weight Used For Protein Calculations: 89.3 kg (196 lb 13.9 oz)(1.2-1.4 g/kg)     Estimated Fluid Requirement Method: RDA Method(or per MD)  RDA Method (mL): 2169         Nutrition Prescription Ordered    Current Diet Order: NPO    Evaluation of Received Nutrient/Fluid Intake    IV Fluid (mL): 4800  I/O: +8.5L since admit  Energy Calories Required: not meeting needs  Protein Required: not meeting needs  Fluid Required: other (see comments)(per MD)  Comments: LBM 5/7  % Intake of Estimated Energy Needs: 0 - 25 %  % Meal Intake: NPO    Nutrition Risk    Level of Risk/Frequency of Follow-up: high(x2/week)     Assessment and Plan  Nutrition Problem  Inadequate energy intake     Related to (etiology):   Current diet (NPO), mechanical ventilation      Signs and Symptoms (as evidenced by):   <75% of nutritional needs being met     Interventions(treatment strategy):  Collaboration of care with providers  Referral of care     Nutrition Diagnosis Status:   Continues       Monitor and Evaluation    Food and Nutrient Intake: energy intake, enteral nutrition intake  Food and Nutrient Adminstration: diet order, enteral and parenteral nutrition administration  Anthropometric Measurements: weight, weight change, body mass index  Biochemical Data, Medical Tests and Procedures: electrolyte and renal panel, gastrointestinal profile, glucose/endocrine profile, inflammatory profile, lipid  profile  Nutrition-Focused Physical Findings: overall appearance     Malnutrition Assessment  Due to recent hospital wide restrictions to limit the transfer of (COVID-19), we are not performing any physical exams at this time. All S/S will be observational; NFPE to be performed at a future date.    Nutrition Follow-Up    RD Follow-up?: Yes

## 2020-05-08 NOTE — PROCEDURES
ICU EEG/VIDEO MONITORING REPORT    Say Olivares  [id number]  1980    DATE OF SERVICE: 5/7-8/2020    DATE OF ADMISSION: 5/5/2020  4:57 PM    ADMITTING PROVIDER: Marcellus Andrade MD    METHODOLOGY   Electroencephalographic (EEG) recording is with electrodes placed according to the International 10-20 placement system.  Thirty two (32) channels of digital signal are simultaneously recorded from the scalp and may include EKG, EMG, and/or eye monitors.   Recording band pass was 0.1 to 512 hz.  Digital video recording of the patient is simultaneously recorded with the EEG.  The nursing staff report clinical symptoms and may press an event button when the patient has symptoms of clinical interest to the treating physicians.  EEG and video recording is stored and archived in digital format.  The entire recording is visually reviewed and the times identified by computer analysis as being spikes or seizures are reviewed again.  Activation procedures which include photic stimulation, hyperventilation and instructing patients to perform simple task are done in selected patients.   Compresses spectral analysis (CSA) is also performed on the activity recorded from each individual channel.  This is displayed as a power display of frequencies from 0 to 30 Hz over time.   The CSA analysis is done and displayed continuously.  This is reviewed for asymmetries in power between homologous areas of the scalp and for presence of changes in power which canbe seen when seizures occur.  Sections of suspected abnormalities on the CSA is then compared with the original EEG recording.     Tab[employer] software was also utilized in the review of this study.  This software suite analyzes the EEG recording in multiple domains.  Coherence and rhythmicity is computed to identify EEG sections which may contain organized seizures.  Each channel undergoes analysis to detect presence of spike and sharp waves which have special and morphological  characteristic of epileptic activity.  The routine EEG recording is converted from spacial into frequency domain.  This is then displayed comparing homologous areas to identify areas of significant asymmetry.  Algorithm to identify non-cortically generated artifact is used to separate eye movement, EMG and other artifact from the EEG.      Recording Times  Start on 5/7/2020, 07:00  Stop on 5/8/2020, 07:00    A total of 24 hours of EEG was recorded.    EEG FINDINGS - rewarming started ~10:00  Complete suppression is seen throughout the study without any discernable cerebral activity.  Occasional electrode artifacts from tubing and stimulation are noted.  No epileptiform activity or electrographic seizures seen.    EKG:   Irregular rhythm seen.    IMPRESSION:   This C-EEG is abnormal due to the complete suppression seen throughout without any discernable cerebral activity seen.  No epileptiform activity or electrographic seizures seen.  Irregular heart rate noted on EKG.    CLINICAL CORRELATION IS RECOMMENDED.    Zuleyka Lama MD, PATRICE(), AMADO ZACARIAS.  Neurology-Epilepsy.  [facility].

## 2020-05-09 NOTE — CHAPLAIN
Met with family bedside and prayed with them. The two teen children really taking it hard. Mom is most worried about them and how she will help them through this; she knows her son is clinically brain dead and wouldn't want to stay in this condition. Waiting for w/d care and I will come back. [id number] for . Lord, in your mercy.

## 2020-05-09 NOTE — PROCEDURES
DATE: 5/8/20    EEG NUMBER: FH -[id number]    REFERRING PHYSICIAN:  Dr. Cristobal      This EEG was performed to assess for status epilepticus      ELECTROENCEPHALOGRAM REPORT     METHODOLOGY:  Electroencephalographic (EEG) is recorded with electrodes placed according to the International 10-20 placement system.  Thirty two (32) channels of digital signal (sampling rate of 512/sec), including T1 and T2, were simultaneously recorded from the scalp and may include EKG, EMG, and/or eye monitors.  Recording band pass was 0.1 to 512 Hz.  Digital video recording of the patient is simultaneously recorded with the EEG.  The patient is instructed to report clinical symptoms which may occur during the recording session.  EEG and video recording are stored and archived in digital format.  Activation procedures, which include photic stimulation, hyperventilation and instructing patients to perform simple tasks, are done in selected patients.     The EEG is displayed on a monitor screen and can be reviewed using different montages.  Computer-assisted analysis is employed to detect spike and electrographic seizure activity.  The entire record is submitted for computer analysis.  The entire recording is visually reviewed, and the times identified by computer analysis as being spikes or seizures are reviewed again.     Compressed spectral analysis (CSA) is also performed on the activity recorded from each individual channel.  This is displayed as a power display of frequencies from 0 to 30 Hz over time.  The CSA is reviewed looking for asymmetries in power between homologous areas of the scalp, then compared with the original EEG recording.     [employer] software was also utilized in the review of this study.  This software suite analyzes the EEG recording in multiple domains.  Coherence and rhythmicity are computed to identify EEG sections which may contain organized seizures.  Each channel undergoes analysis to detect the presence of  spike and sharp waves which have special and morphological characteristics of epileptic activity.  The routine EEG recording is converted from special into frequency domain.  This is then displayed comparing homologous areas to identify areas of significant asymmetry.  Algorithm to identify non-cortically generated artifact is used to separate artifact from the EEG.     Recording time  Start on May 8, 2020 at hours 7 min 1 sec 2   End on May 9, 2020 at hours 7 min 0 sec 8  The total time of EEG recording for the study was 23 hr and 56 min    EEG FINDINGS:  The recording was obtained with a number of standard bipolar and referential montages during comatose state.  In this state a diffusely suppressed background was noted.  Reactivity was absent.  Minimal variability was noted and initially in the recording frontal predominant synchronous theta activity which was periodic was noted in brief runs.  There were no interictal epileptiform abnormalities and no clinical or electrographic seizures were recorded.  No clear state changes were appreciated    The EKG channel revealed a sinus rhythm.     IMPRESSION:  This is an abnormal EEG during comatose state.  A diffusely suppressed background with minimal variability was noted     CLINICAL CORRELATION:  The patient is a 39 year-old male who presented after ventricular fibrillation.  The patient is currently not maintained on any antiseizure medications.  This is an abnormal EEG during comatose state.  Diffuse suppression of the background was noted.  The absence of reactivity is suggestive of poor prognosis.  Minimal variability was noted during this study.  No seizures were recorded.

## 2020-05-09 NOTE — DISCHARGE SUMMARY
[facility]  Neurocritical Care  Discharge Summary    Admit Date: 5/5/2020    Service Date: 05/09/2020    Discharge Date:     Length of Stay: 4    Final Active Diagnoses:    Diagnosis Date Noted POA    PRINCIPAL PROBLEM:  Overdose [T50.901A] 05/05/2020 Yes    Hypotension [I95.9] 05/05/2020 Unknown    Lactic acidosis [E87.2] 05/05/2020 Unknown    Troponin level elevated [R79.89] 05/05/2020 Unknown    Transaminitis [R74.0] 05/05/2020 Unknown    Cardiac arrest - out of hospital [I46.9] 05/05/2020 Yes    MARIA DEL ROSARIO (acute kidney injury) [N17.9] 05/05/2020 Yes    Shock [R57.9]  Yes    Acute respiratory failure with hypoxia and hypercapnia [J96.01, J96.02]  Yes    ATN (acute tubular necrosis) [N17.0]  Yes      Problems Resolved During this Admission:    Diagnosis Date Noted Date Resolved POA    Hyperkalemia [E87.5]  05/08/2020 Yes      History of Present Illness: Mr. Olivares is a 38 yo man with PMH of opioid abuse admitted as a transfer from [facility] for possible cocaine and amphetamine overdose. Patient has a hx of chronic back pain and was found down by his family at 6am this morning snoring on the couch, and when by 7:30 am his family realized he was unresponsive they called EMS.   CPR was performed by a bystander until EMS arrived and placed an Ambu® Spike LTS-D laryngeal tube.   Patient was in asystole when EMS got to the scene and was given given Epi  and 2 mg of Narcan. Patient then went into ventricular fibrillation, and was defibrillated x 1, and arrived in the ED in NSR.     Per transfer note, Patient was hypotensive after arrival to lowest of 51/41, then gradually went to 87/66, then 117/58 by 1pm.   Patient's pupils were found to be fixed and dilated with agonal respirations.  He had track marks to right AC.  He was subsequently intubated in the ED after arrival.   Patient was cooled for less than 12 hours prior to arrival, will continue for 24hrs     Upon Labs revealed elevated  LA to 16, Cr of 2, K of 6.3.  His UDS was positive for amphetamines and cocaine.  COVID was negative at [facility], repeat COVID test pending.  CT chest showed debris/mass or secretion in the right proximal lower lobe bronchus along with intraluminal calcifications (?broncholiths).  Recommend bronchoscopy.  Right lower lobe atelectasis with shift of the mediastinum to the right.  Centrilobar emphysema with patchy right upper lobe pneumonia.  Smaller pneumonia in the left upper lobe along the fissure and significant bibasilar infiltrates c/w multifocal pneumonia.       He was given Na Bicarb iv x 4, had NGT and roque placed, Kayexalate 30g, 10 U insulin and CaGluconate.  He was started on Levophed for the hypotension at 4 mamie/min.  He was given empiric Zosyn 4.5g iv x 1 and Zyvox 500mg iv x 1 after blood cultures.  His repeat K after above therapies was 5.7.     Patient was transferred to [facility] for higher level of care, Video EEG monitoring began, A-line and Femoral line placed. Will continue current cooling mgt.    Hospital Course by Event: 05/05/2020: Patient admitted as a transfer from [facility] for possible amphetamine and cocaine overdose. Video EEG monitoring began, A-line and Femoral line placed. Will continue current cooling mgt  05/06/2020: Patient had bronchoscopy last night done by Dr. Andrade and had a cocaine rock taken out of his lungs.  05/07/2020: Patient had GI bleed overnight, GI consulted, recommended  PPI drip, has shock liver with worsening transaminitis AST and ALT markedly elevated at 14,433 and 9,383, on is on dialysis, hypocalcemia, Patient also has elevated CK at 40,000, Ammonia 208, will start Rifaximin, lactulose, Vitamin K x 3 doses, and FFP 2 units, check INR today, start Midodrine 5mg for hypotension, patient developed afib with RVR, started Metoprolol  05/08/2020: Patient started on steroids, on levophed, vasopressin and Epinephrine, patient no longer has any brain  stem reflexes however he would have to be rewarmed at target temperature 37 degrees celsius for 24 hours.  05/09/2020: Patient has been at target temperature for over 24 hrs    Hospital Course by Problem:   * Overdose  39 year old man found down this morning by family s/p CPR transferred after cooling process begun 12 hours ago, Utox positive for Cocaine and Amphetamines,     Neuro:    Seizures/Epilepsy Monitoring Evaluation:  - Continue current management.  - Seizure Precautions  - Continue vEEG monitoring   -- CT Head pending  -- SBP goal MAP >65  --PT/OT/Speech    Pulmonary:   Acute Respiratory Failure   -- Daily CXR  -- Daily ABGs   Vent Mode: A/C  Oxygen Concentration (%):  [] 50  Resp Rate Total:  [28 br/min-38 br/min] 28 br/min  Vt Set:  [530 mL] 530 mL  PEEP/CPAP:  [5 cmH20-10 cmH20] 5 cmH20  Mean Airway Pressure:  [10 vsW79-81 cmH20] 10 cmH20    Cardiac:   Hypotension  -- Continue to monitor BP   --MAP goal >65  -- 2D echo   -- On two pressors, Vasopressin 0.04 and Levophed 0.7 needing to go up on pressor requirements due to hemodialysis added epinephrine    Tachycardia   HR in the 170's sustained,   IV metoprolol 5mg given        Renal:   --Continue to monitor I/O  --Continue to monitor BUN/Cr  -- BUN 5; Creatinine 0.9  -- PO4 elevated patient on dialysis  -- follow labs CMP q12  -Patient continued on CRRT, replacing calcium through CRRT    ID:   -- Rewarming patient today to targeted temperature 37 degrees celsius  -- No leukocytosis   -- Continue ampicillin 3g Q6hrs    Hem/Onc:   --continue to monitor H/H       Endocrine: A1C 5.6  --Continue to monitor BG  -- Continue sliding scale  -- POCT q 6      Fluids/Electrolytes/Nutrition/GI:   - Continue TF  -- Continue to monitor and replace electrolytes accordingly    PPX   -PUD: Pantoprazole drip for GI bleed  -DVT: : heparin 5000 units q 8, TCD, SCD    Rewarming started at 3 am on 05/08. Patient will need to be warmed for 24 hrs before he gets tested  for brain death. Continue Rifaximin, lactulose, start steroids, patient on 3 pressors.      Uninterrupted Critical Care/Counseling Time (not including procedures):  >50 min    Vladimir Rubin MD  Neurocritical Care  [facility] ANDUJAR (acute tubular necrosis)  Continue dialysis    Acute respiratory failure with hypoxia and hypercapnia  Intubated    Vent Mode: A/C  Oxygen Concentration (%):  [] 50  Resp Rate Total:  [28 br/min-38 br/min] 28 br/min  Vt Set:  [530 mL] 530 mL  PEEP/CPAP:  [5 cmH20-10 cmH20] 5 cmH20  Mean Airway Pressure:  [10 wcQ27-34 cmH20] 10 cmH20    Shock  Patient has Shock liver, transaminitis  AST and ALT markedly elevated at 14,433 and 9,383,  Ammonia elevated at 208, will start Rifaximin, lactulose,  INR  Vitamin K x 3 doses, and FFP 2 units, check INR today,   start Midodrine 5mg for hypotension, Patient developed afib with RVR, started metoprolol        Rhabdomyolysis  Continue dialysis per nephrology    MARIA DEL ROSARIO (acute kidney injury)  Continue dialysis per nephrology    Cardiac arrest - out of hospital  Patient being warmed to 37 degrees celsius  troponins x 3  EKG on 05/05/20  Vent. Rate : 082 BPM     Atrial Rate : 082 BPM     P-R Int : 170 ms          QRS Dur : 082 ms      QT Int : 450 ms       P-R-T Axes : 065 -22 074 degrees     QTc Int : 525 ms    Normal sinus rhythm  Nonspecific ST and T wave abnormality  Prolonged QT  Abnormal ECG    Repeat EKG today    Transaminitis  transaminitis  AST and ALT markedly elevated at 14,433 and 9,383, hypocalcemia  CMP q12hrs      Troponin level elevated  troponins elevated from 4 to 12, will trend x 3  EKG  · ECHO: Severely decreased left ventricular systolic function with severe global hypokinesis. The estimated ejection fraction is 20%.  · Moderately to severely reduced right ventricular systolic function.  · Grade I (mild) left ventricular diastolic dysfunction consistent with impaired relaxation.  · Mild tricuspid  regurgitation.  · The estimated PA systolic pressure is at least 17 mmHg.  Mechanically ventilated; cannot use inferior caval vein diameter to estimate central venous pressure.    Lactic acidosis  Continue to trend lactic acid  Normal saline bolus  IVF 75cc/hr    Hypotension  IVF bolus N/S 500cc  Continue with IVF  Hypotension not responsive to fluids due to hypothermia  Patient is on three pressors  Epinephrine  Levophed  Vasopressin      Significant Results:  Imaging: CXR 05/05/20  Endotracheal tube at the level of the clavicular heads.  Enteric tube in place with tip below the diaphragm out of field of view.    Right basilar opacity obscuring the diaphragm and right heart with some volume loss as well as scattered air bronchograms and bandlike opacities likely representing combination of atelectasis/infiltrate including aspiration or pneumonia.  There is hazy opacification with diffuse nonspecific interstitial coarsening of the aerated right mid to upper lung which could reflect pulmonary edema or nonspecific interstitial pneumonia.  There is nonspecific thickening of the right paratracheal stripe.  There is suspected right pleural effusion extending to the apex, likely moderate in volume.  Left hemithorax is well expanded and grossly clear.  No pneumothorax.  No acute osseous process seen      Microbiology:  Microbiology Results (last 7 days)     Procedure Component Value Units Date/Time    Blood culture [[id number]] Collected:  05/07/20 1110    Order Status:  Completed Specimen:  Blood from Peripheral, Antecubital, Left Updated:  05/09/20 1412     Blood Culture, Routine No Growth to date      No Growth to date      No Growth to date    Blood culture [[id number]] Collected:  05/07/20 1115    Order Status:  Completed Specimen:  Blood from Peripheral, Forearm, Left Updated:  05/09/20 1412     Blood Culture, Routine No Growth to date      No Growth to date      No Growth to date    Culture, Respiratory with Gram  Stain [[id number]] Collected:  05/07/20 0433    Order Status:  Completed Specimen:  Respiratory from Sputum, Induced Updated:  05/09/20 0751     Respiratory Culture Normal respiratory steven      No S aureus or Pseudomonas isolated.     Gram Stain (Respiratory) <10 epithelial cells per low power field.     Gram Stain (Respiratory) Rare WBC's     Gram Stain (Respiratory) Few yeast     Gram Stain (Respiratory) Rare Gram positive rods     Gram Stain (Respiratory) Rare Gram negative rods    Blood culture [[id number]] Collected:  05/05/20 1726    Order Status:  Completed Specimen:  Blood from Peripheral, Upper Arm, Left Updated:  05/08/20 2012     Blood Culture, Routine No Growth to date      No Growth to date      No Growth to date      No Growth to date    Blood culture [[id number]] Collected:  05/05/20 1715    Order Status:  Completed Specimen:  Blood from Peripheral, Wrist, Right Updated:  05/08/20 2012     Blood Culture, Routine No Growth to date      No Growth to date      No Growth to date      No Growth to date          Laboratory:  Lab Results   Component Value Date    HGBA1C 5.6 05/05/2020    TSH 1.409 05/05/2020       Pending Results: N/A    Consultations:  IP CONSULT TO REGISTERED DIETITIAN/NUTRITIONIST  IP CONSULT TO NEPHROLOGY  IP CONSULT TO GI  PHARMACY TO DOSE VANCOMYCIN CONSULT    Procedures:   * No surgery found * by * Surgery not found *.    Medications:   There are no discharge medications for this patient.     Death Note  Critical Care      Admit Date: 5/5/2020    Date of Death: 05/09/2020    Attending Physician: Cb Cristobal MD    Principal Diagnoses: Overdose    Preliminary Cause of Death: Overdose    Secondary Diagnoses:   Active Hospital Problems    Diagnosis  POA    *Overdose [T50.901A]  Yes     Priority: 1 - High    Hypotension [I95.9]  Unknown    Lactic acidosis [E87.2]  Unknown    Troponin level elevated [R79.89]  Unknown    Transaminitis [R74.0]  Unknown    Cardiac arrest - out of  hospital [I46.9]  Yes    MARIA DEL ROSARIO (acute kidney injury) [N17.9]  Yes    Shock [R57.9]  Yes    Acute respiratory failure with hypoxia and hypercapnia [J96.01, J96.02]  Yes    ATN (acute tubular necrosis) [N17.0]  Yes      Resolved Hospital Problems    Diagnosis Date Resolved POA    Hyperkalemia [E87.5] 2020 Yes        Discharged Condition:     Consultations were held with the family regarding the patient's expected poor prognosis. At the direction of the family, the patient was extubated and measures to ensure the comfort of the patient including, but not limited to, morphine as needed for pain and air hunger as well as benzodiazepines as needed for agitation. The patient was subsequently declared dead.        Diet:    Activity:      Disposition: Discharged to [facility] in  condition.    Follow Up Plan:      services  [facility] services    This discharge took greater than 30 minutes to complete.    Vladimir Rubin MD  Neurocritical Care fellow  Neurocritical care  [facility]

## 2020-05-09 NOTE — PLAN OF CARE
Brain death exam performed this morning by Dr. Cristobal. No brainstem reflexes, no cold caloric response, no spontaneous breathing. Family updated of results via phone.     Family to bedside at 1430. Dr. Cristobal coming to bedside to update family.

## 2020-05-09 NOTE — ASSESSMENT & PLAN NOTE
Intubated    Vent Mode: A/C  Oxygen Concentration (%):  [50-70] 50  Resp Rate Total:  [28 br/min-32 br/min] 28 br/min  Vt Set:  [530 mL] 530 mL  PEEP/CPAP:  [10 cmH20] 10 cmH20  Mean Airway Pressure:  [15 fhG14-22 cmH20] 15 cmH20

## 2020-05-09 NOTE — PROGRESS NOTES
[facility]  Neurocritical Care  Progress Note    Admit Date: 5/5/2020  Service Date: 05/09/2020  Length of Stay: 4    Subjective:     Chief Complaint: Overdose    History of Present Illness: Mr. Olivares is a 38 yo man with PMH of opioid abuse admitted as a transfer from [facility] for possible cocaine and amphetamine overdose. Patient has a hx of chronic back pain and was found down by his family at 6am this morning snoring on the couch, and when by 7:30 am his family realized he was unresponsive they called EMS.   CPR was performed by a bystander until EMS arrived and placed an Ambu® Spike LTS-D laryngeal tube.   Patient was in asystole when EMS got to the scene and was given given Epi  and 2 mg of Narcan. Patient then went into ventricular fibrillation, and was defibrillated x 1, and arrived in the ED in NSR.     Per transfer note, Patient was hypotensive after arrival to lowest of 51/41, then gradually went to 87/66, then 117/58 by 1pm.   Patient's pupils were found to be fixed and dilated with agonal respirations.  He had track marks to right AC.  He was subsequently intubated in the ED after arrival.   Patient was cooled for less than 12 hours prior to arrival, will continue for 24hrs     Upon Labs revealed elevated LA to 16, Cr of 2, K of 6.3.  His UDS was positive for amphetamines and cocaine.  COVID was negative at [facility], repeat COVID test pending.  CT chest showed debris/mass or secretion in the right proximal lower lobe bronchus along with intraluminal calcifications (?broncholiths).  Recommend bronchoscopy.  Right lower lobe atelectasis with shift of the mediastinum to the right.  Centrilobar emphysema with patchy right upper lobe pneumonia.  Smaller pneumonia in the left upper lobe along the fissure and significant bibasilar infiltrates c/w multifocal pneumonia.       He was given Na Bicarb iv x 4, had NGT and roque placed, Kayexalate 30g, 10 U insulin and CaGluconate.  He was  started on Levophed for the hypotension at 4 mamie/min.  He was given empiric Zosyn 4.5g iv x 1 and Zyvox 500mg iv x 1 after blood cultures.  His repeat K after above therapies was 5.7.     Patient was transferred to [facility] for higher level of care, Video EEG monitoring began, A-line and Femoral line placed. Will continue current cooling mgt.    Hospital Course: 05/05/2020: Patient admitted as a transfer from [facility] for possible amphetamine and cocaine overdose. Video EEG monitoring began, A-line and Femoral line placed. Will continue current cooling mgt  05/06/2020: Patient had bronchoscopy last night done by Dr. Andrade and had a cocaine rock taken out of his lungs.  05/07/2020: Patient had GI bleed overnight, GI consulted, recommended  PPI drip, has shock liver with worsening transaminitis AST and ALT markedly elevated at 14,433 and 9,383, on is on dialysis, hypocalcemia, Patient also has elevated CK at 40,000, Ammonia 208, will start Rifaximin, lactulose, Vitamin K x 3 doses, and FFP 2 units, check INR today, start Midodrine 5mg for hypotension, patient developed afib with RVR, started Metoprolol  05/08/2020: Patient started on steroids, on levophed, vasopressin and Epinephrine, patient no longer has any brain stem reflexes however he would have to be rewarmed at target temperature 37 degrees celsius for 24 hours.  05/09/2020: Patient has been at target temperature for over 24 hrs Brain death test done today. Patient had a positive apnea test with PCO2 on ABG initially 42mmHg and after 5 minutes of disconnnecting the ventilator on repeat ABG, his PCO2 bibiana to 73mmHg. Patient is now declared brain dead. Family will be arriving soon.    Admit Date: 5/5/2020  LOS: 4    CC: Overdose    Code Status: Full Code     SUBJECTIVE:     Interval History/Significant Events: Rewarming started yesterday on started at 3 am on 05/08. Patient has been warmed at target temperature for 24 hrs needs to get  tested for brain death. Continue Rifaximin, lactulose, start steroids, patient on 4 pressors.      Review of Symptoms: ROS unable to be completed, patient unresponsive, intubated    Constitutional: Negative for fevers or chills.  Pulmonary:intubated   Cardiology: Negative for chest pain or palpitations.  GI: Negative for abdominal pain or constipation.  Neurologic: Negative for new weakness, headaches, or paresthesias.     Medications:  Continuous Infusions:   sodium chloride 0.9% 200 mL/hr at 05/09/20 0700    dextrose 10 % in water (D10W) 20 mL/hr at 05/09/20 0700    epinephrine Stopped (05/07/20 2305)    levothyroxine (SYNTHROID) IV syringe infusion (PICU) 10 mcg/hr (05/09/20 0700)    norepinephrine bitartrate-D5W 0.52 mcg/kg/min (05/09/20 0700)    pantoprozole (PROTONIX) IV infusion 8 mg/hr (05/09/20 0700)    vasopressin (PITRESSIN) infusion 0.08 Units/min (05/09/20 0700)     Scheduled Meds:   ampicillin-sulbactim (UNASYN) IVPB  3 g Intravenous Q6H    chlorhexidine  15 mL Mouth/Throat BID    hydrocortisone sodium succinate  100 mg Intravenous Q8H    lactulose  10 g Per NG tube TID    metoprolol tartrate  25 mg Per OG tube TID    midodrine  10 mg Per NG tube Q8H    phytonadione ((AQUA-MEPHYTON) IVPB  5 mg Intravenous Daily    rifAXImin  550 mg Per OG tube BID     PRN Meds:.Dextrose 10% Bolus, Dextrose 10% Bolus, glucagon (human recombinant), insulin aspart U-100, magnesium sulfate IVPB, ondansetron, sodium phosphate IVPB, sodium phosphate IVPB, sodium phosphate IVPB, Pharmacy to dose Vancomycin consult **AND** vancomycin - pharmacy to dose    OBJECTIVE:   Vital Signs (Most Recent):   Temp: 98.8 °F (37.1 °C) (05/09/20 0700)  Pulse: 78 (05/09/20 0700)  Resp: (!) 28 (05/09/20 0700)  BP: 127/70 (05/09/20 0700)  SpO2: 99 % (05/09/20 0700)    Vital Signs (24h Range):   Temp:  [98.2 °F (36.8 °C)-98.8 °F (37.1 °C)] 98.8 °F (37.1 °C)  Pulse:  [78-89] 78  Resp:  [0-28] 28  SpO2:  [98 %-99 %] 99 %  BP:  ()/(53-90) 127/70  Arterial Line BP: ()/(54-81) 114/64    ICP/CPP (Last 24h):        I & O (Last 24h):     Intake/Output Summary (Last 24 hours) at 5/9/2020 0725  Last data filed at 5/9/2020 0700  Gross per 24 hour   Intake 8701.4 ml   Output 8780 ml   Net -78.6 ml     Physical Exam:  GA: intubated comfortable, no acute distress.   HEENT: No scleral icterus or JVD.   Pulmonary: Intubated Clear to auscultation A/P/L. No wheezing, crackles, or rhonchi.  Cardiac: RRR S1 & S2 w/o rubs/murmurs/gallops.   Abdominal: Bowel sounds present x 4. No appreciable hepatosplenomegaly.  Skin: No jaundice, rashes, or visible lesions.  Neuro:  --GCS: E1 VT M1  --Mental Status:  Unresponsive  --CN II-XII grossly intact.   --Pupils 3 mm, fixed non reactive.   --Corneal reflex, gag, cough intact.  --LUE strength: 1/5  --RUE strength: 1/5  --LLE strength: 1/5  --RLE strength: 1/5    Vent Data:   Vent Mode: A/C  Oxygen Concentration (%):  [50-70] 50  Resp Rate Total:  [28 br/min-32 br/min] 28 br/min  Vt Set:  [530 mL] 530 mL  PEEP/CPAP:  [10 cmH20] 10 cmH20  Mean Airway Pressure:  [15 gdW98-88 cmH20] 15 cmH20    Lines/Drains/Airway:        Airway - Non-Surgical Endotracheal Tube (Active)   Secured at 22 cm 5/6/2020  5:10 AM   Measured At Lips 5/6/2020  5:10 AM   Secured Location Left 5/6/2020  5:10 AM   Secured by Commercial tube macias 5/6/2020  5:10 AM   Bite Block none 5/6/2020  5:10 AM   Site Condition Cool;Dry 5/6/2020  5:10 AM   Status Intact;Secured;Patent 5/6/2020  5:10 AM   Site Assessment Clean;Dry;No bleeding;No drainage 5/6/2020  5:10 AM   Cuff Pressure 24 cm H2O 5/6/2020  5:10 AM      Percutaneous Central Line Insertion/Assessment - Quad Lumen  05/05/20 1845 right femoral vein (Active)   Verification by X-ray Yes 5/5/2020  7:05 PM   Site Assessment No drainage;No redness;No swelling;No warmth 5/5/2020  7:05 PM   Line Securement Device Secured with sutures 5/5/2020  7:05 PM   Dressing Type Biopatch in place;Central  line dressing with pants 5/5/2020  7:05 PM   Dressing Status Clean;Dry;Intact 5/5/2020  7:05 PM   Dressing Intervention Integrity maintained 5/5/2020  7:05 PM   Date on Dressing 05/05/20 5/5/2020  7:05 PM   Dressing Due to be Changed 05/12/20 5/5/2020  7:05 PM   Distal Patency/Care blood return present;infusing 5/5/2020  7:05 PM   Medial 1 Patency/Care blood return present;normal saline lock 5/5/2020  7:05 PM   Medial 2 Patency/Care blood return present;normal saline lock 5/5/2020  7:05 PM   Proximal 1 Patency/Care blood return present;infusing 5/5/2020  7:05 PM   Line Necessity Review Hemodynamic instability 5/5/2020  7:05 PM       Trialysis (Dialysis) Catheter 05/05/20 2143 left femoral (Active)           Arterial Line 05/05/20 2340 Left Brachial (Active)           NG/OG Tube 05/05/20 1200 Right nostril (Active)   Placement Check placement verified by aspirate characteristics 5/5/2020  7:05 PM   Tolerance no signs/symptoms of discomfort 5/5/2020  7:05 PM   Securement secured to nostril center 5/5/2020  7:05 PM   Clamp Status/Tolerance clamped;no emesis;no nausea;no residual;no restlessness 5/5/2020  7:05 PM   Suction Setting/Drainage Method suction at the bedside 5/5/2020  7:05 PM   Insertion Site Appearance no redness, warmth, tenderness, skin breakdown, drainage 5/5/2020  7:05 PM            Urethral Catheter 05/05/20 1751 Temperature probe (Active)   Site Assessment Clean;Intact 5/5/2020  7:05 PM   Collection Container Urimeter 5/5/2020  7:05 PM   Securement Method secured to top of thigh w/ adhesive device 5/5/2020  7:05 PM   Catheter Care Performed yes 5/5/2020  7:05 PM   Reason for Continuing Urinary Catheterization Critically ill in ICU requiring intensive monitoring 5/5/2020  7:05 PM   Output (mL) 15 mL 5/6/2020  7:05 AM       Trialysis (Dialysis) Catheter 05/05/20 2143 left femoral (Active)     Nutrition/Tube Feeds (if NPO state why): TF     Labs:  ABG:   Recent Labs   Lab 05/09/20  0509   PH 7.533*   PO2  118*   PCO2 38.5   HCO3 32.4*   POCSATURATED 99   BE 10     BMP:  Recent Labs   Lab 05/09/20  0326     141  141   K 3.4*  3.4*  3.4*     100  100   CO2 29  29  29   BUN 5*  5*  5*   CREATININE 0.9  0.9  0.9   GLU 99  99  99   MG 1.8  1.8   PHOS 2.3*  2.3*  2.3*     LFT:   Lab Results   Component Value Date    AST 4,377 (H) 05/09/2020    ALT 5,124 (H) 05/09/2020    ALKPHOS 206 (H) 05/09/2020    BILITOT 6.4 (H) 05/09/2020    ALBUMIN 2.2 (L) 05/09/2020    ALBUMIN 2.2 (L) 05/09/2020    ALBUMIN 2.2 (L) 05/09/2020    PROT 4.5 (L) 05/09/2020     CBC:   Lab Results   Component Value Date    WBC 25.51 (H) 05/09/2020    HGB 10.8 (L) 05/09/2020    HCT 32.5 (L) 05/09/2020    MCV 93 05/09/2020    PLT 57 (L) 05/09/2020     Microbiology x 7d:   Microbiology Results (last 7 days)     Procedure Component Value Units Date/Time    Blood culture [[id number]] Collected:  05/05/20 1726    Order Status:  Completed Specimen:  Blood from Peripheral, Upper Arm, Left Updated:  05/08/20 2012     Blood Culture, Routine No Growth to date      No Growth to date      No Growth to date      No Growth to date    Blood culture [[id number]] Collected:  05/05/20 1715    Order Status:  Completed Specimen:  Blood from Peripheral, Wrist, Right Updated:  05/08/20 2012     Blood Culture, Routine No Growth to date      No Growth to date      No Growth to date      No Growth to date    Blood culture [[id number]] Collected:  05/07/20 1110    Order Status:  Completed Specimen:  Blood from Peripheral, Antecubital, Left Updated:  05/08/20 1412     Blood Culture, Routine No Growth to date      No Growth to date    Blood culture [[id number]] Collected:  05/07/20 1115    Order Status:  Completed Specimen:  Blood from Peripheral, Forearm, Left Updated:  05/08/20 1412     Blood Culture, Routine No Growth to date      No Growth to date    Culture, Respiratory with Gram Stain [[id number]] Collected:  05/07/20 0433    Order Status:  Completed  Specimen:  Respiratory from Sputum, Induced Updated:  05/08/20 0755     Respiratory Culture Normal respiratory steven     Gram Stain (Respiratory) <10 epithelial cells per low power field.     Gram Stain (Respiratory) Rare WBC's     Gram Stain (Respiratory) Few yeast     Gram Stain (Respiratory) Rare Gram positive rods     Gram Stain (Respiratory) Rare Gram negative rods        Imaging: CXR   ET tube, enteric tube and esophageal probe appear unchanged.  Wires and tubing overlie the chest.    Interval placement of central catheter from an inferior approach with its tip overlying the mid right atrium.    Heart and lungs  appear unchanged when allowing for differences in technique and positioning.      Impression       Interval placement of central catheter from an inferior approach with its tip overlying the mid right atrium.         I personally reviewed the above image.    ASSESSMENT/PLAN:     Active Hospital Problems    Diagnosis    *Overdose    Hypotension    Lactic acidosis    Troponin level elevated    Transaminitis    Cardiac arrest - out of hospital    MARIA DEL ROSARIO (acute kidney injury)    Rhabdomyolysis    Shock    Acute respiratory failure with hypoxia and hypercapnia    ATN (acute tubular necrosis)        Uninterrupted Critical Care/Counseling Time (not including procedures): 33 mins    Assessment/Plan:     Pulmonary  Acute respiratory failure with hypoxia and hypercapnia  Intubated    Vent Mode: A/C  Oxygen Concentration (%):  [50-70] 50  Resp Rate Total:  [28 br/min-32 br/min] 28 br/min  Vt Set:  [530 mL] 530 mL  PEEP/CPAP:  [10 cmH20] 10 cmH20  Mean Airway Pressure:  [15 bgY49-66 cmH20] 15 cmH20    Cardiac/Vascular  Cardiac arrest - out of hospital  Patient being warmed to 37 degrees celsius  troponins x 3  EKG on 05/05/20  Vent. Rate : 082 BPM     Atrial Rate : 082 BPM     P-R Int : 170 ms          QRS Dur : 082 ms      QT Int : 450 ms       P-R-T Axes : 065 -22 074 degrees     QTc Int : 525  ms    Normal sinus rhythm  Nonspecific ST and T wave abnormality  Prolonged QT  Abnormal ECG    Repeat EKG today    Troponin level elevated  troponins elevated from 4 to 12, will trend x 3  EKG  · ECHO: Severely decreased left ventricular systolic function with severe global hypokinesis. The estimated ejection fraction is 20%.  · Moderately to severely reduced right ventricular systolic function.  · Grade I (mild) left ventricular diastolic dysfunction consistent with impaired relaxation.  · Mild tricuspid regurgitation.  · The estimated PA systolic pressure is at least 17 mmHg.  Mechanically ventilated; cannot use inferior caval vein diameter to estimate central venous pressure.    Hypotension  IVF bolus N/S 500cc  Continue with IVF  Hypotension not responsive to fluids due to hypothermia  Patient is on three pressors  Epinephrine  Levophed  Vasopressin    Renal/  ATN (acute tubular necrosis)  Continue dialysis    MARIA DEL ROSARIO (acute kidney injury)  Continue dialysis per nephrology    Lactic acidosis  Continue to trend lactic acid  Normal saline bolus  IVF 75cc/hr    GI  Transaminitis  transaminitis  AST and ALT markedly elevated at 14,433 and 9,383, hypocalcemia  CMP q12hrs      Orthopedic  Rhabdomyolysis  Continue dialysis per nephrology    Other  * Overdose  39 year old man found down this morning by family s/p CPR transferred after cooling process begun 12 hours ago, Utox positive for Cocaine and Amphetamines,     Neuro:    Seizures/Epilepsy Monitoring Evaluation:  - Continue current management.  - Seizure Precautions  - Continue vEEG monitoring   -- CT Head pending  -- SBP goal MAP >65  --PT/OT/Speech    Pulmonary:   Acute Respiratory Failure   -- Daily CXR  -- Daily ABGs   Vent Mode: A/C  Oxygen Concentration (%):  [50-70] 50  Resp Rate Total:  [28 br/min-32 br/min] 28 br/min  Vt Set:  [530 mL] 530 mL  PEEP/CPAP:  [10 cmH20] 10 cmH20  Mean Airway Pressure:  [15 ibP49-34 cmH20] 15 cmH20    Cardiac:   Hypotension  --  Continue to monitor BP   --MAP goal >65  -- 2D echo   -- On two pressors, Vasopressin 0.04 and Levophed 0.7 needing to go up on pressor requirements due to hemodialysis added epinephrine    Tachycardia   HR in the 170's sustained,   IV metoprolol 5mg given        Renal:   --Continue to monitor I/O  --Continue to monitor BUN/Cr  -- BUN 5; Creatinine 0.9  -- PO4 elevated patient on dialysis  -- follow labs CMP q12  -Patient continued on CRRT, replacing calcium through CRRT    ID:   -- Rewarming patient today to targeted temperature 37 degrees celsius  -- No leukocytosis   -- Continue ampicillin 3g Q6hrs    Hem/Onc:   --continue to monitor H/H       Endocrine: A1C 5.6  --Continue to monitor BG  -- Continue sliding scale  -- POCT q 6      Fluids/Electrolytes/Nutrition/GI:   - Continue TF  -- Continue to monitor and replace electrolytes accordingly    PPX   -PUD: Pantoprazole drip for GI bleed  -DVT: : heparin 5000 units q 8, TCD, SCD    Rewarming started at 3 am on 05/08. Patient will need to be warmed for 24 hrs before he gets tested for brain death. Continue Rifaximin, lactulose, start steroids, patient on 3 pressors.      Uninterrupted Critical Care/Counseling Time (not including procedures):  >50 min    Vladimir Rubin MD  Neurocritical Care  [facility]    -     Shock  Patient has Shock liver, transaminitis  AST and ALT markedly elevated at 14,433 and 9,383,  Ammonia elevated at 208, will start Rifaximin, lactulose,  INR  Vitamin K x 3 doses, and FFP 2 units, check INR today,   start Midodrine 5mg for hypotension, Patient developed afib with RVR, started metoprolol          Brain death test done today. Patient had a positive apnea test with PCO2 on ABG initially 42mmHg and after 5 minutes of disconnnecting the ventilator on repeat ABG, his PCO2 bibiana to 73mmHg. Patient is now declared brain dead. Family will be arriving soon.        The patient is being Prophylaxed for:  Venous Thromboembolism  with: Mechanical or Chemical  Stress Ulcer with: PPI  Ventilator Pneumonia with: chlorhexidine oral care    Activity Orders          None        Full Code    Vladimir Rubin MD  Neurocritical Care Fellow  Neurocritical Care  [facility]

## 2020-05-09 NOTE — ASSESSMENT & PLAN NOTE
39 year old man found down this morning by family s/p CPR transferred after cooling process begun 12 hours ago, Utox positive for Cocaine and Amphetamines,     Neuro:    Seizures/Epilepsy Monitoring Evaluation:  - Continue current management.  - Seizure Precautions  - Continue vEEG monitoring   -- CT Head pending  -- SBP goal MAP >65  --PT/OT/Speech    Pulmonary:   Acute Respiratory Failure   -- Daily CXR  -- Daily ABGs   Vent Mode: A/C  Oxygen Concentration (%):  [] 50  Resp Rate Total:  [28 br/min-38 br/min] 28 br/min  Vt Set:  [530 mL] 530 mL  PEEP/CPAP:  [5 cmH20-10 cmH20] 5 cmH20  Mean Airway Pressure:  [10 xbG56-24 cmH20] 10 cmH20    Cardiac:   Hypotension  -- Continue to monitor BP   --MAP goal >65  -- 2D echo   -- On two pressors, Vasopressin 0.04 and Levophed 0.7 needing to go up on pressor requirements due to hemodialysis added epinephrine    Tachycardia   HR in the 170's sustained,   IV metoprolol 5mg given        Renal:   --Continue to monitor I/O  --Continue to monitor BUN/Cr  -- BUN 5; Creatinine 0.9  -- PO4 elevated patient on dialysis  -- follow labs CMP q12  -Patient continued on CRRT, replacing calcium through CRRT    ID:   -- Rewarming patient today to targeted temperature 37 degrees celsius  -- No leukocytosis   -- Continue ampicillin 3g Q6hrs    Hem/Onc:   --continue to monitor H/H       Endocrine: A1C 5.6  --Continue to monitor BG  -- Continue sliding scale  -- POCT q 6      Fluids/Electrolytes/Nutrition/GI:   - Continue TF  -- Continue to monitor and replace electrolytes accordingly    PPX   -PUD: Pantoprazole drip for GI bleed  -DVT: : heparin 5000 units q 8, TCD, SCD    Rewarming started at 3 am on 05/08. Patient will need to be warmed for 24 hrs before he gets tested for brain death. Continue Rifaximin, lactulose, start steroids, patient on 3 pressors.      Uninterrupted Critical Care/Counseling Time (not including procedures):  >50 min    Vladimir Rubin MD  Neurocritical  Care [facility]    -

## 2020-05-09 NOTE — PROGRESS NOTES
[facility]  Nephrology  Progress Note    Patient Name: Say Olivares  MRN: [id number]  Admission Date: 5/5/2020  Hospital Length of Stay: 4 days  Attending Provider: Cb Cristobal MD   Primary Care Physician: Primary Doctor No  Principal Problem:Overdose    Subjective:     HPI: History gathered from medical record:  40 y/o man with PMH of opioid abuse transferred [place] [facility] [place] 5/5/2020 for higher level of care.  Originally presented to [facility] for possible cocaine and amphetamine overdose.  Patient has a hx of chronic back pain and was found down by his family at 6am 5/5/2020 snoring on the couch, and by 7:30 am his family realized he was unresponsive thus called EMS.  CPR was performed by a bystander until EMS arrived and placed an Ambu® Spike LTS-D laryngeal tube.   Patient was in asystole when EMS got to the scene and was given given Epi and 2 mg of Narcan.  Patient then went into ventricular fibrillation, and was defibrillated x 1, and arrived in the ED in NSR.  Per transfer note, patient had been hypotensive after arrival 50/40s, gradually increased to 117/58 by 1pm.  Labs on arrival revealed lactic acid 16, sCr 2, K 6.3, with UDS positive amphetamine and cocaine.  COVID negative at OSH.  Was given NaBicarb IV x 4, NGT and roque placed, Kayaxelate 30 g given, and shifted with 10 units insulin and CaGluc.  Started on Levophed, empiric antibiotics, and transferred to [facility].      Nephrology consulted for Taya, rhabdomyolysis, and evaluation for RRt.    Interval History: Patient currently on two pressors, anuric, on continuous CRRT. Currently undergoing brain death protocol. Possible withdrawal of care/RICHARD later today.     Review of patient's allergies indicates:  No Known Allergies  Current Facility-Administered Medications   Medication Frequency    0.9%  NaCl infusion (CRRT USE ONLY) Continuous    ampicillin-sulbactam 3 g in sodium chloride 0.9 % 100 mL IVPB (ready to mix system) Q6H     chlorhexidine 0.12 % solution 15 mL BID    dextrose 10 % infusion Continuous    dextrose 10% (D10W) Bolus PRN    dextrose 10% (D10W) Bolus PRN    EPINEPHrine (ADRENALIN) 5 mg in sodium chloride 0.9% 250 mL infusion Continuous    glucagon (human recombinant) injection 1 mg PRN    hydrocortisone sodium succinate injection 100 mg Q8H    insulin aspart U-100 pen 1-10 Units Q6H PRN    lactulose 20 gram/30 mL solution Soln 10 g TID    levothyroxine (SYNTHROID, LEVOTHROID) 200 mcg in sodium chloride 0.9% 50 mL IV syringe (conc: 4 mcg/mL) Continuous    magnesium sulfate 2g in water 50mL IVPB (premix) PRN    metoprolol tartrate (LOPRESSOR) tablet 25 mg TID    midodrine tablet 10 mg Q8H    norepinephrine 32 mg in dextrose 5 % 250 mL infusion Continuous    ondansetron injection 4 mg Q6H PRN    pantoprazole (PROTONIX) 40 mg in dextrose 5 % 100 mL infusion Continuous    rifAXIMin tablet 550 mg BID    sodium phosphate 20.01 mmol in dextrose 5 % 250 mL IVPB PRN    sodium phosphate 30 mmol in dextrose 5 % 250 mL IVPB PRN    sodium phosphate 39.99 mmol in dextrose 5 % 250 mL IVPB PRN    vancomycin - pharmacy to dose pharmacy to manage frequency    vancomycin 1.75 g in 5 % dextrose 500 mL IVPB Once    vasopressin (PITRESSIN) 0.2 Units/mL in dextrose 5 % 100 mL infusion Continuous       Objective:     Vital Signs (Most Recent):  Temp: 98.8 °F (37.1 °C) (05/09/20 0900)  Pulse: 76 (05/09/20 0900)  Resp: (!) 28 (05/09/20 0900)  BP: 119/67 (05/09/20 0800)  SpO2: 99 % (05/09/20 0900)  O2 Device (Oxygen Therapy): ventilator (05/09/20 0730) Vital Signs (24h Range):  Temp:  [98.2 °F (36.8 °C)-98.8 °F (37.1 °C)] 98.8 °F (37.1 °C)  Pulse:  [76-89] 76  Resp:  [0-28] 28  SpO2:  [98 %-99 %] 99 %  BP: ()/(53-90) 119/67  Arterial Line BP: ()/(54-81) 121/70     Weight: 89.4 kg (197 lb) (05/06/20 1153)  Body mass index is 26.72 kg/m².  Body surface area is 2.13 meters squared.    I/O last 3 completed shifts:  In:  92843 [I.V.:62440.5; Other:2.5; NG/GT:420; IV Piggyback:1650]  Out: 86386 [Urine:5; Drains:600; Other:73299; Stool:650]    Physical Exam   Constitutional:   Critically ill   HENT:   Head: Normocephalic.   Neck: Neck supple.   Cardiovascular: Normal rate and regular rhythm.   Pulmonary/Chest:   Vent transmitted breath sounds   Abdominal: Soft. Bowel sounds are normal.   Musculoskeletal:   Track marks in upper extremities   Neurological:   Sedated   Nursing note and vitals reviewed.      Significant Labs:  CBC:   Recent Labs   Lab 05/09/20  0326   WBC 25.51*   RBC 3.49*   HGB 10.8*   HCT 32.5*   PLT 57*   MCV 93   MCH 30.9   MCHC 33.2     CMP:   Recent Labs   Lab 05/09/20  0735   *   CALCIUM 8.3*   ALBUMIN 2.1*   PROT 4.4*      K 3.3*   CO2 30*   CL 99   BUN 5*   CREATININE 0.8   ALKPHOS 230*   ALT 5,047*   AST 4,255*   BILITOT 6.4*     All labs within the past 24 hours have been reviewed.     Significant Imaging:  Labs: Reviewed    Assessment/Plan:     * Overdose  Per primary team    Acute respiratory failure with hypoxia and hypercapnia  Per primary team    TAYA (acute kidney injury)  Likely ischemic ATN from hypotension and cardiac arrest on 5/5/2020    Plan:  - Patient anuric despite mannitol administered  - Metabolic acidosis likely from shock causing lactic acidosis, as evidenced by elevated liver enzymes, Taya, and elevated troponins  - Will continue to provide SLED treatment for metabolic clearance  - Strict I/Os and chart  - MAP >65  - Avoid nephrotoxic agents, NSAIDs, IV contrast, etc  - Renal US  - U/A, UPCr (when and if patient produces any urine)  - Renal function panel per RRt protocol  - Will continue to follow closely    Cardiac arrest - out of hospital  Per primary team    Hypotension  On vasopressors by primary team        Thank you for your consult. I will follow-up with patient. Please contact us if you have any additional questions.    Ron Huddleston MD  Nephrology  [facility]  [place]

## 2020-05-09 NOTE — ASSESSMENT & PLAN NOTE
Intubated    Vent Mode: A/C  Oxygen Concentration (%):  [] 50  Resp Rate Total:  [28 br/min-38 br/min] 28 br/min  Vt Set:  [530 mL] 530 mL  PEEP/CPAP:  [5 cmH20-10 cmH20] 5 cmH20  Mean Airway Pressure:  [10 jkI98-21 cmH20] 10 cmH20

## 2020-05-09 NOTE — ASSESSMENT & PLAN NOTE
IVF bolus N/S 500cc  Continue with IVF  Hypotension not responsive to fluids due to hypothermia  Patient is on three pressors  Epinephrine  Levophed  Vasopressin

## 2020-05-09 NOTE — PROGRESS NOTES
Pharmacokinetic Assessment Follow Up: IV Vancomycin    Vancomycin serum concentration assessment(s):  · Vancomycin 1,250 mg x 1 administered yesterday at 1154.  · 19.5 hour random resulted at 10.3 mcg/mL.     Vancomycin Regimen Plan:  · Re-dose with 1,750 mg x 1 now.  · Continue to pulse dose while on SLED.  · Random level with AM labs on 5/10.    Drug levels (last 3 results):  Recent Labs   Lab Result Units 05/08/20  0737 05/09/20  0735   Vancomycin, Random ug/mL 14.7 10.3       Pharmacy will continue to follow and monitor vancomycin. Please contact pharmacy for questions regarding this assessment.    Thank you for the consult,   Jeni Sanchez, PharmD, BCCCP  Neurocritical Care Clinical Pharmacist  Spectralink: [id number]  ______________________________________________________________________________________________________       Patient brief summary:  Say Olivares is a 39 y.o. male initiated on antimicrobial therapy with IV Vancomycin for treatment of bacteremia    The patient's current regimen is pulse dosing by level.    Drug Allergies:   Review of patient's allergies indicates:  No Known Allergies    Actual Body Weight:   89.4 kg    Renal Function:   Estimated Creatinine Clearance: 134.7 mL/min (based on SCr of 0.8 mg/dL).,     Dialysis Method (if applicable):  SLED    CBC (last 72 hours):  Recent Labs   Lab Result Units 05/07/20  0145 05/07/20  0523 05/07/20  1831 05/08/20  0255 05/09/20  0326   WBC K/uL 17.90* 20.04* 22.30* 25.28* 25.51*   Hemoglobin g/dL 12.3* 12.4* 11.3* 11.1* 10.8*   Hematocrit % 39.7* 39.8* 36.5* 34.7* 32.5*   Platelets K/uL 133* 132* 98* 77* 57*   Gran% % 86.0* 69.0 72.0 86.1* 92.8*   Lymph% % 9.0* 12.0* 8.0* 7.4* 5.8*   Mono% % 3.0* 2.0* 2.0* 1.8* 0.1*   Eosinophil% % 0.0 0.0 0.0 0.1 0.0   Basophil% % 0.0 0.0 0.0 0.4 0.4   Differential Method  Manual Manual Manual Automated Automated       Metabolic Panel (last 72 hours):  Recent Labs   Lab Result Units 05/06/20  1218 05/06/20  1444  05/06/20  1618 05/06/20  1958 05/06/20  2143 05/07/20  0003 05/07/20  0409 05/07/20  0750 05/07/20  1122 05/07/20  1303 05/07/20  1528 05/07/20  1831 05/07/20  2007 05/07/20  2224 05/08/20  0023 05/08/20  0255 05/08/20  0737 05/08/20  1156 05/08/20  1421 05/08/20  1515 05/08/20  2113 05/09/20  0010 05/09/20  0326 05/09/20  0735   Sodium mmol/L 136 136 135* 137 137 138 136  136 139 139  139 138 138  138 139 139  139 137 135* 134*  134* 136 135* 135*  135* 135* 140  140  140 140 141  141  141 141   Potassium mmol/L 6.1* 5.6* 5.4* 5.8* 5.5* 5.6* 4.6  4.6 4.8 4.3  4.3 3.9 3.7  3.7 3.7 3.6  3.6 3.5 3.6 3.5  3.5 3.6 3.8 4.0  4.0 4.0 3.7  3.7  3.7 3.6 3.4*  3.4*  3.4* 3.3*   Chloride mmol/L 105 106 105 105 106 106 106  106 102 98  98 98 99  99 100 101  101 99 98 98  98 99 98 99  99 99 100  100  100 99 100  100  100 99   CO2 mmol/L 17* 17* 18* 17* 17* 17* 15*  15* 22* 24  24 24 21*  21* 22* 22*  22* 20* 21* 21*  21* 24 22* 23  23 22* 27  27  27 30* 29  29  29 30*   Glucose mg/dL 66* 66* 81 60* 64* 61* 84  84 68* 86  86 83 74  74 66* 68*  68* 82 84 77  77 73 86 100  100 93 90  90  90 96 99  99  99 112*   BUN, Bld mg/dL 20 15 13 11 9 8 7  7 6 5*  5* 5* 5*  5* 5* 4*  4* 4* 5* 4*  4* 5* 5* 5*  5* 5* 5*  5*  5* 5* 5*  5*  5* 5*   Creatinine mg/dL 2.0* 1.7* 1.5* 1.4 1.2 1.2 1.0  1.0 0.9 0.9  0.9 0.9 0.8  0.8 0.8 0.9  0.9 0.9 0.9 0.9  0.9 0.9 0.9 0.9  0.9 0.9 0.9  0.9  0.9 0.9 0.9  0.9  0.9 0.8   Albumin g/dL 2.5* 2.7* 2.5* 2.7* 2.6* 2.7* 2.3*  2.3* 2.5* 2.6* 2.6* 2.6*  --  2.6* 2.4* 2.4* 2.4*  2.4* 2.3* 2.3* 2.2*  2.2* 2.2* 2.2*  2.2*  2.2* 2.2* 2.2*  2.2*  2.2* 2.1*   Total Bilirubin mg/dL 3.1*  --  3.5* 4.0*  --  4.2* 4.0* 4.5* 4.4*  --  5.0*  --  5.2*  --  5.1* 5.2* 5.4* 5.9*  --  6.0* 5.9* 6.3* 6.4* 6.4*   Alkaline Phosphatase U/L 101  --  118 127  --  127 114 123 121  --  138*  --  142*  --  137* 143* 159* 169*  --  164* 184* 193* 206* 230*   AST  U/L 9,294*  --  12,501* 14,466*  --  15,326* 14,433* 15,515* 13,665*  --  14,175*  --  13,873*  --  11,746* 10,955* 9,385* 8,429*  --  6,624* 5,958* 5,499* 4,377*  --    ALT U/L 5,557*  --  8,056* 9,362*  --  10,086* 9,383* 9,773* 8,592*  --  9,063*  --  9,269*  --  8,559* 8,371* 7,672* 7,406*  --  6,249* 5,916* 5,814* 5,124*  --    Magnesium mg/dL 1.8 2.3 2.3 2.1 2.0 1.9 1.9  1.9 2.3 2.0 2.0 1.8 1.8 1.8 1.8 1.8 1.7  1.7 1.7 1.8 2.1 2.4 1.9  1.9 1.9 1.8  1.8 2.1   Phosphorus mg/dL 5.5* 4.4 4.3 4.2 3.7 3.9 3.7  3.7 3.9 3.6 3.1 3.5 3.4 3.7 2.9 2.2* 1.8*  1.8* 1.8* 1.7* 1.9*  1.9* 1.9* 2.8  2.8  2.8 2.3* 2.3*  2.3*  2.3* 3.5       Vancomycin Administrations:  vancomycin given in the last 96 hours                   vancomycin 1.25 g in dextrose 5% 250 mL IVPB (ready to mix) (mg) 1,250 mg New Bag 05/07/20 7502                Microbiologic Results:  Microbiology Results (last 7 days)     Procedure Component Value Units Date/Time    Culture, Respiratory with Gram Stain [[id number]] Collected:  05/07/20 6456    Order Status:  Completed Specimen:  Respiratory from Sputum, Induced Updated:  05/09/20 0753     Respiratory Culture Normal respiratory steven      No S aureus or Pseudomonas isolated.     Gram Stain (Respiratory) <10 epithelial cells per low power field.     Gram Stain (Respiratory) Rare WBC's     Gram Stain (Respiratory) Few yeast     Gram Stain (Respiratory) Rare Gram positive rods     Gram Stain (Respiratory) Rare Gram negative rods    Blood culture [[id number]] Collected:  05/05/20 1726    Order Status:  Completed Specimen:  Blood from Peripheral, Upper Arm, Left Updated:  05/08/20 2012     Blood Culture, Routine No Growth to date      No Growth to date      No Growth to date      No Growth to date    Blood culture [[id number]] Collected:  05/05/20 1715    Order Status:  Completed Specimen:  Blood from Peripheral, Wrist, Right Updated:  05/08/20 2012     Blood Culture, Routine No Growth to date      No  Growth to date      No Growth to date      No Growth to date    Blood culture [[id number]] Collected:  05/07/20 1110    Order Status:  Completed Specimen:  Blood from Peripheral, Antecubital, Left Updated:  05/08/20 1412     Blood Culture, Routine No Growth to date      No Growth to date    Blood culture [[id number]] Collected:  05/07/20 1115    Order Status:  Completed Specimen:  Blood from Peripheral, Forearm, Left Updated:  05/08/20 1412     Blood Culture, Routine No Growth to date      No Growth to date

## 2020-05-09 NOTE — CHAPLAIN
Visited w/family before, during and after w/d care; prayed with them, present during death; provided prayer, compassionate presence and support. Mother strong [employer] and appreciated presence and prayer. Two children ages 17 and 14 having a very difficult time; Tried calling [facility] [employer], but no answer- not sure if they provide services on weekend or even after death. Nurse has decedent lavinia. , in your mercy.

## 2020-05-09 NOTE — SUBJECTIVE & OBJECTIVE
Death Note  Critical Care      Admit Date: 2020    Date of Death: 2020    Attending Physician: Cb Cristobal MD    Principal Diagnoses: Overdose    Preliminary Cause of Death: Overdose    Secondary Diagnoses:   Active Hospital Problems    Diagnosis  POA    *Overdose [T50.901A]  Yes     Priority: 1 - High    Hypotension [I95.9]  Unknown    Lactic acidosis [E87.2]  Unknown    Troponin level elevated [R79.89]  Unknown    Transaminitis [R74.0]  Unknown    Cardiac arrest - out of hospital [I46.9]  Yes    MARIA DEL ROSARIO (acute kidney injury) [N17.9]  Yes    Shock [R57.9]  Yes    Acute respiratory failure with hypoxia and hypercapnia [J96.01, J96.02]  Yes    ATN (acute tubular necrosis) [N17.0]  Yes      Resolved Hospital Problems    Diagnosis Date Resolved POA    Hyperkalemia [E87.5] 2020 Yes        Discharged Condition:     Consultations were held with the family regarding the patient's expected poor prognosis. At the direction of the family, the patient was extubated and measures to ensure the comfort of the patient including, but not limited to, morphine as needed for pain and air hunger as well as benzodiazepines as needed for agitation. The patient was subsequently declared dead.

## 2020-05-09 NOTE — PLAN OF CARE
POC reviewed with pt at 0500. Pt unable to verbalize understanding. Levo @ 0.52, Protonix @ 8, levothyroxine @ 2.5 ml, D10 @ 20, Vaso @ 0.08. CRRT continued all shift. cEEG continued. Possible brain death study today. Questions and concerns addressed. No acute events overnight. Pt progressing toward goals. Will continue to monitor. See flowsheets for full assessment and VS info

## 2020-05-09 NOTE — ASSESSMENT & PLAN NOTE
39 year old man found down this morning by family s/p CPR transferred after cooling process begun 12 hours ago, Utox positive for Cocaine and Amphetamines,     Neuro:    Seizures/Epilepsy Monitoring Evaluation:  - Continue current management.  - Seizure Precautions  - Continue vEEG monitoring   -- CT Head pending  -- SBP goal MAP >65  --PT/OT/Speech    Pulmonary:   Acute Respiratory Failure   -- Daily CXR  -- Daily ABGs   Vent Mode: A/C  Oxygen Concentration (%):  [50-70] 50  Resp Rate Total:  [28 br/min-32 br/min] 28 br/min  Vt Set:  [530 mL] 530 mL  PEEP/CPAP:  [10 cmH20] 10 cmH20  Mean Airway Pressure:  [15 krC37-68 cmH20] 15 cmH20    Cardiac:   Hypotension  -- Continue to monitor BP   --MAP goal >65  -- 2D echo   -- On two pressors, Vasopressin 0.04 and Levophed 0.7 needing to go up on pressor requirements due to hemodialysis added epinephrine    Tachycardia   HR in the 170's sustained,   IV metoprolol 5mg given        Renal:   --Continue to monitor I/O  --Continue to monitor BUN/Cr  -- BUN 5; Creatinine 0.9  -- PO4 elevated patient on dialysis  -- follow labs CMP q12  -Patient continued on CRRT, replacing calcium through CRRT    ID:   -- Rewarming patient today to targeted temperature 37 degrees celsius  -- No leukocytosis   -- Continue ampicillin 3g Q6hrs    Hem/Onc:   --continue to monitor H/H       Endocrine: A1C 5.6  --Continue to monitor BG  -- Continue sliding scale  -- POCT q 6      Fluids/Electrolytes/Nutrition/GI:   - Continue TF  -- Continue to monitor and replace electrolytes accordingly    PPX   -PUD: Pantoprazole drip for GI bleed  -DVT: : heparin 5000 units q 8, TCD, SCD    Rewarming started at 3 am on 05/08. Patient will need to be warmed for 24 hrs before he gets tested for brain death. Continue Rifaximin, lactulose, start steroids, patient on 3 pressors.      Uninterrupted Critical Care/Counseling Time (not including procedures):  >50 min    Vladimir Rubin MD  Neurocritical Care  [facility]    -

## 2020-05-09 NOTE — ASSESSMENT & PLAN NOTE
Likely ischemic ATN from hypotension and cardiac arrest on 5/5/2020    Plan:  - Patient anuric despite mannitol administered  - Metabolic acidosis likely from shock causing lactic acidosis, as evidenced by elevated liver enzymes, Taya, and elevated troponins  - Will continue to provide SLED treatment for metabolic clearance  - Strict I/Os and chart  - MAP >65  - Avoid nephrotoxic agents, NSAIDs, IV contrast, etc  - Renal US  - U/A, UPCr (when and if patient produces any urine)  - Renal function panel per RRt protocol  - Will continue to follow closely

## 2020-05-09 NOTE — SIGNIFICANT EVENT
Asystole noted on monitor at 4:30 pm  No spontaneous respirations  No palpable pulse or heart beat  No auscultated breath sounds or heart sounds  Time of death 4:30 pm

## 2020-05-09 NOTE — SUBJECTIVE & OBJECTIVE
Interval History: Patient currently on two pressors, anuric, on continuous CRRT. Currently undergoing brain death protocol. Possible withdrawal of care/RICHARD later today.     Review of patient's allergies indicates:  No Known Allergies  Current Facility-Administered Medications   Medication Frequency    0.9%  NaCl infusion (CRRT USE ONLY) Continuous    ampicillin-sulbactam 3 g in sodium chloride 0.9 % 100 mL IVPB (ready to mix system) Q6H    chlorhexidine 0.12 % solution 15 mL BID    dextrose 10 % infusion Continuous    dextrose 10% (D10W) Bolus PRN    dextrose 10% (D10W) Bolus PRN    EPINEPHrine (ADRENALIN) 5 mg in sodium chloride 0.9% 250 mL infusion Continuous    glucagon (human recombinant) injection 1 mg PRN    hydrocortisone sodium succinate injection 100 mg Q8H    insulin aspart U-100 pen 1-10 Units Q6H PRN    lactulose 20 gram/30 mL solution Soln 10 g TID    levothyroxine (SYNTHROID, LEVOTHROID) 200 mcg in sodium chloride 0.9% 50 mL IV syringe (conc: 4 mcg/mL) Continuous    magnesium sulfate 2g in water 50mL IVPB (premix) PRN    metoprolol tartrate (LOPRESSOR) tablet 25 mg TID    midodrine tablet 10 mg Q8H    norepinephrine 32 mg in dextrose 5 % 250 mL infusion Continuous    ondansetron injection 4 mg Q6H PRN    pantoprazole (PROTONIX) 40 mg in dextrose 5 % 100 mL infusion Continuous    rifAXIMin tablet 550 mg BID    sodium phosphate 20.01 mmol in dextrose 5 % 250 mL IVPB PRN    sodium phosphate 30 mmol in dextrose 5 % 250 mL IVPB PRN    sodium phosphate 39.99 mmol in dextrose 5 % 250 mL IVPB PRN    vancomycin - pharmacy to dose pharmacy to manage frequency    vancomycin 1.75 g in 5 % dextrose 500 mL IVPB Once    vasopressin (PITRESSIN) 0.2 Units/mL in dextrose 5 % 100 mL infusion Continuous       Objective:     Vital Signs (Most Recent):  Temp: 98.8 °F (37.1 °C) (05/09/20 0900)  Pulse: 76 (05/09/20 0900)  Resp: (!) 28 (05/09/20 0900)  BP: 119/67 (05/09/20 0800)  SpO2: 99 % (05/09/20  0900)  O2 Device (Oxygen Therapy): ventilator (05/09/20 0730) Vital Signs (24h Range):  Temp:  [98.2 °F (36.8 °C)-98.8 °F (37.1 °C)] 98.8 °F (37.1 °C)  Pulse:  [76-89] 76  Resp:  [0-28] 28  SpO2:  [98 %-99 %] 99 %  BP: ()/(53-90) 119/67  Arterial Line BP: ()/(54-81) 121/70     Weight: 89.4 kg (197 lb) (05/06/20 1153)  Body mass index is 26.72 kg/m².  Body surface area is 2.13 meters squared.    I/O last 3 completed shifts:  In: 93898 [I.V.:72212.5; Other:2.5; NG/GT:420; IV Piggyback:1650]  Out: 67142 [Urine:5; Drains:600; Other:69256; Stool:650]    Physical Exam   Constitutional:   Critically ill   HENT:   Head: Normocephalic.   Neck: Neck supple.   Cardiovascular: Normal rate and regular rhythm.   Pulmonary/Chest:   Vent transmitted breath sounds   Abdominal: Soft. Bowel sounds are normal.   Musculoskeletal:   Track marks in upper extremities   Neurological:   Sedated   Nursing note and vitals reviewed.      Significant Labs:  CBC:   Recent Labs   Lab 05/09/20  0326   WBC 25.51*   RBC 3.49*   HGB 10.8*   HCT 32.5*   PLT 57*   MCV 93   MCH 30.9   MCHC 33.2     CMP:   Recent Labs   Lab 05/09/20  0735   *   CALCIUM 8.3*   ALBUMIN 2.1*   PROT 4.4*      K 3.3*   CO2 30*   CL 99   BUN 5*   CREATININE 0.8   ALKPHOS 230*   ALT 5,047*   AST 4,255*   BILITOT 6.4*     All labs within the past 24 hours have been reviewed.     Significant Imaging:  Labs: Reviewed

## 2020-05-11 LAB
BACTERIA BLD CULT: NORMAL
BACTERIA BLD CULT: NORMAL

## 2020-05-12 NOTE — PLAN OF CARE
Per MD: Time of death 4:30 pm 2020 1524   Final Note   Assessment Type Final Discharge Note   Anticipated Discharge Disposition      Jackie Hope RN, CCRN-K, [facility]  Neuro-Critical Care   X [phone]

## 2020-05-13 NOTE — PHYSICIAN QUERY
PT Name: Say Olivares  MR #: [id number]     Physician Query Form - Documentation Clarification      CDS/: Kimberly August RN, CDS               Contact information: [email or web address]    This form is a permanent document in the medical record.     Query Date: May 13, 2020    By submitting this query, we are merely seeking further clarification of documentation. Please utilize your independent clinical judgment when addressing the question(s) below.    The Medical record reflects the following:    Supporting Clinical Findings Location in Medical Record   ICU BRONCHOSCOPY PROCEDURE NOTE   · INDICATION: Pulmonary toilet/mucous plugging/BAL sampling   · FINDINGS: mucosa quiet, no erythema or friability. White powdery substance seen obstructing R main, R upper, R middle, R lower, L lower, and L upper bronchi. Collapsing bronchi seen in b/l lower airways.    Procedure Note 5/5    ct chest with aspirated debris in R lower bronchus   H&P 5/5 05/06/2020: Patient had bronchoscopy last night done by Dr. Andrade and had a cocaine rock taken out of his lungs. Neuro Critical Care PN 5/6                                                                            Doctor, please clarify the location in which foreign body was removed from     Provider Use Only     [  x ] Bronchi    [   ] Lungs    [   ] Other clarification, please specify __________                                                                                                           [  ] Clinically Undetermined

## 2021-09-22 NOTE — PROGRESS NOTES
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 level  Impression: 1  Severe facet degenerative change at C4-5 results in 6 mm anterolisthesis with subsequent moderate central stenosis  Multilevel cervical facet degenerative changes are noted throughout the cervical spine  No cord compression identified  It is noted that there is limited evaluation of the C2-3 level secondary to suboptimal contrast opacification at this level  2   Tiny central protrusion type disc herniation thoracic spine T7-8 without central or foraminal narrowing  3   Spondylotic degenerative changes throughout the lumbar spine with multilevel facet degenerative change results in mild multilevel central and foraminal narrowing  Workstation performed: [id number]     EKG, Pathology, and Other Studies: I have personally reviewed pertinent reports        VTE Pharmacologic Prophylaxis: Reason for no pharmacologic prophylaxis not ordered, cleared for pharm ppx    VTE Mechanical Prophylaxis: sequential compression device

## 2025-02-19 NOTE — SUBJECTIVE & OBJECTIVE
Admit Date: 5/5/2020  LOS: 4    CC: Overdose    Code Status: Full Code     SUBJECTIVE:     Interval History/Significant Events: Rewarming started yesterday on started at 3 am on 05/08. Patient has been warmed at target temperature for 24 hrs needs to get tested for brain death. Continue Rifaximin, lactulose, start steroids, patient on 4 pressors.      Review of Symptoms: ROS unable to be completed, patient unresponsive, intubated    Constitutional: Negative for fevers or chills.  Pulmonary:intubated   Cardiology: Negative for chest pain or palpitations.  GI: Negative for abdominal pain or constipation.  Neurologic: Negative for new weakness, headaches, or paresthesias.     Medications:  Continuous Infusions:   sodium chloride 0.9% 200 mL/hr at 05/09/20 0700    dextrose 10 % in water (D10W) 20 mL/hr at 05/09/20 0700    epinephrine Stopped (05/07/20 2305)    levothyroxine (SYNTHROID) IV syringe infusion (PICU) 10 mcg/hr (05/09/20 0700)    norepinephrine bitartrate-D5W 0.52 mcg/kg/min (05/09/20 0700)    pantoprozole (PROTONIX) IV infusion 8 mg/hr (05/09/20 0700)    vasopressin (PITRESSIN) infusion 0.08 Units/min (05/09/20 0700)     Scheduled Meds:   ampicillin-sulbactim (UNASYN) IVPB  3 g Intravenous Q6H    chlorhexidine  15 mL Mouth/Throat BID    hydrocortisone sodium succinate  100 mg Intravenous Q8H    lactulose  10 g Per NG tube TID    metoprolol tartrate  25 mg Per OG tube TID    midodrine  10 mg Per NG tube Q8H    phytonadione ((AQUA-MEPHYTON) IVPB  5 mg Intravenous Daily    rifAXImin  550 mg Per OG tube BID     PRN Meds:.Dextrose 10% Bolus, Dextrose 10% Bolus, glucagon (human recombinant), insulin aspart U-100, magnesium sulfate IVPB, ondansetron, sodium phosphate IVPB, sodium phosphate IVPB, sodium phosphate IVPB, Pharmacy to dose Vancomycin consult **AND** vancomycin - pharmacy to dose    OBJECTIVE:   Vital Signs (Most Recent):   Temp: 98.8 °F (37.1 °C) (05/09/20 0700)  Pulse: 78 (05/09/20  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 She was asking about FMLA   0700)  Resp: (!) 28 (05/09/20 0700)  BP: 127/70 (05/09/20 0700)  SpO2: 99 % (05/09/20 0700)    Vital Signs (24h Range):   Temp:  [98.2 °F (36.8 °C)-98.8 °F (37.1 °C)] 98.8 °F (37.1 °C)  Pulse:  [78-89] 78  Resp:  [0-28] 28  SpO2:  [98 %-99 %] 99 %  BP: ()/(53-90) 127/70  Arterial Line BP: ()/(54-81) 114/64    ICP/CPP (Last 24h):        I & O (Last 24h):     Intake/Output Summary (Last 24 hours) at 5/9/2020 0725  Last data filed at 5/9/2020 0700  Gross per 24 hour   Intake 8701.4 ml   Output 8780 ml   Net -78.6 ml     Physical Exam:  GA: intubated comfortable, no acute distress.   HEENT: No scleral icterus or JVD.   Pulmonary: Intubated Clear to auscultation A/P/L. No wheezing, crackles, or rhonchi.  Cardiac: RRR S1 & S2 w/o rubs/murmurs/gallops.   Abdominal: Bowel sounds present x 4. No appreciable hepatosplenomegaly.  Skin: No jaundice, rashes, or visible lesions.  Neuro:  --GCS: E1 VT M1  --Mental Status:  Unresponsive  --CN II-XII grossly intact.   --Pupils 3 mm, fixed non reactive.   --Corneal reflex, gag, cough intact.  --LUE strength: 1/5  --RUE strength: 1/5  --LLE strength: 1/5  --RLE strength: 1/5    Vent Data:   Vent Mode: A/C  Oxygen Concentration (%):  [50-70] 50  Resp Rate Total:  [28 br/min-32 br/min] 28 br/min  Vt Set:  [530 mL] 530 mL  PEEP/CPAP:  [10 cmH20] 10 cmH20  Mean Airway Pressure:  [15 xgP63-77 cmH20] 15 cmH20    Lines/Drains/Airway:        Airway - Non-Surgical Endotracheal Tube (Active)   Secured at 22 cm 5/6/2020  5:10 AM   Measured At Lips 5/6/2020  5:10 AM   Secured Location Left 5/6/2020  5:10 AM   Secured by Commercial tube macias 5/6/2020  5:10 AM   Bite Block none 5/6/2020  5:10 AM   Site Condition Cool;Dry 5/6/2020  5:10 AM   Status Intact;Secured;Patent 5/6/2020  5:10 AM   Site Assessment Clean;Dry;No bleeding;No drainage 5/6/2020  5:10 AM   Cuff Pressure 24 cm H2O 5/6/2020  5:10 AM      Percutaneous Central Line Insertion/Assessment - Quad Lumen  05/05/20 1845 right  femoral vein (Active)   Verification by X-ray Yes 5/5/2020  7:05 PM   Site Assessment No drainage;No redness;No swelling;No warmth 5/5/2020  7:05 PM   Line Securement Device Secured with sutures 5/5/2020  7:05 PM   Dressing Type Biopatch in place;Central line dressing with pants 5/5/2020  7:05 PM   Dressing Status Clean;Dry;Intact 5/5/2020  7:05 PM   Dressing Intervention Integrity maintained 5/5/2020  7:05 PM   Date on Dressing 05/05/20 5/5/2020  7:05 PM   Dressing Due to be Changed 05/12/20 5/5/2020  7:05 PM   Distal Patency/Care blood return present;infusing 5/5/2020  7:05 PM   Medial 1 Patency/Care blood return present;normal saline lock 5/5/2020  7:05 PM   Medial 2 Patency/Care blood return present;normal saline lock 5/5/2020  7:05 PM   Proximal 1 Patency/Care blood return present;infusing 5/5/2020  7:05 PM   Line Necessity Review Hemodynamic instability 5/5/2020  7:05 PM       Trialysis (Dialysis) Catheter 05/05/20 2143 left femoral (Active)           Arterial Line 05/05/20 2340 Left Brachial (Active)           NG/OG Tube 05/05/20 1200 Right nostril (Active)   Placement Check placement verified by aspirate characteristics 5/5/2020  7:05 PM   Tolerance no signs/symptoms of discomfort 5/5/2020  7:05 PM   Securement secured to nostril center 5/5/2020  7:05 PM   Clamp Status/Tolerance clamped;no emesis;no nausea;no residual;no restlessness 5/5/2020  7:05 PM   Suction Setting/Drainage Method suction at the bedside 5/5/2020  7:05 PM   Insertion Site Appearance no redness, warmth, tenderness, skin breakdown, drainage 5/5/2020  7:05 PM            Urethral Catheter 05/05/20 1751 Temperature probe (Active)   Site Assessment Clean;Intact 5/5/2020  7:05 PM   Collection Container Urimeter 5/5/2020  7:05 PM   Securement Method secured to top of thigh w/ adhesive device 5/5/2020  7:05 PM   Catheter Care Performed yes 5/5/2020  7:05 PM   Reason for Continuing Urinary Catheterization Critically ill in ICU requiring intensive  monitoring 5/5/2020  7:05 PM   Output (mL) 15 mL 5/6/2020  7:05 AM       Trialysis (Dialysis) Catheter 05/05/20 2143 left femoral (Active)     Nutrition/Tube Feeds (if NPO state why): TF     Labs:  ABG:   Recent Labs   Lab 05/09/20  0509   PH 7.533*   PO2 118*   PCO2 38.5   HCO3 32.4*   POCSATURATED 99   BE 10     BMP:  Recent Labs   Lab 05/09/20  0326     141  141   K 3.4*  3.4*  3.4*     100  100   CO2 29  29  29   BUN 5*  5*  5*   CREATININE 0.9  0.9  0.9   GLU 99  99  99   MG 1.8  1.8   PHOS 2.3*  2.3*  2.3*     LFT:   Lab Results   Component Value Date    AST 4,377 (H) 05/09/2020    ALT 5,124 (H) 05/09/2020    ALKPHOS 206 (H) 05/09/2020    BILITOT 6.4 (H) 05/09/2020    ALBUMIN 2.2 (L) 05/09/2020    ALBUMIN 2.2 (L) 05/09/2020    ALBUMIN 2.2 (L) 05/09/2020    PROT 4.5 (L) 05/09/2020     CBC:   Lab Results   Component Value Date    WBC 25.51 (H) 05/09/2020    HGB 10.8 (L) 05/09/2020    HCT 32.5 (L) 05/09/2020    MCV 93 05/09/2020    PLT 57 (L) 05/09/2020     Microbiology x 7d:   Microbiology Results (last 7 days)     Procedure Component Value Units Date/Time    Blood culture [[id number]] Collected:  05/05/20 1726    Order Status:  Completed Specimen:  Blood from Peripheral, Upper Arm, Left Updated:  05/08/20 2012     Blood Culture, Routine No Growth to date      No Growth to date      No Growth to date      No Growth to date    Blood culture [[id number]] Collected:  05/05/20 1715    Order Status:  Completed Specimen:  Blood from Peripheral, Wrist, Right Updated:  05/08/20 2012     Blood Culture, Routine No Growth to date      No Growth to date      No Growth to date      No Growth to date    Blood culture [[id number]] Collected:  05/07/20 1110    Order Status:  Completed Specimen:  Blood from Peripheral, Antecubital, Left Updated:  05/08/20 1412     Blood Culture, Routine No Growth to date      No Growth to date    Blood culture [[id number]] Collected:  05/07/20 1115    Order Status:   Completed Specimen:  Blood from Peripheral, Forearm, Left Updated:  05/08/20 1412     Blood Culture, Routine No Growth to date      No Growth to date    Culture, Respiratory with Gram Stain [[id number]] Collected:  05/07/20 0433    Order Status:  Completed Specimen:  Respiratory from Sputum, Induced Updated:  05/08/20 0755     Respiratory Culture Normal respiratory steven     Gram Stain (Respiratory) <10 epithelial cells per low power field.     Gram Stain (Respiratory) Rare WBC's     Gram Stain (Respiratory) Few yeast     Gram Stain (Respiratory) Rare Gram positive rods     Gram Stain (Respiratory) Rare Gram negative rods        Imaging: CXR   ET tube, enteric tube and esophageal probe appear unchanged.  Wires and tubing overlie the chest.    Interval placement of central catheter from an inferior approach with its tip overlying the mid right atrium.    Heart and lungs  appear unchanged when allowing for differences in technique and positioning.      Impression       Interval placement of central catheter from an inferior approach with its tip overlying the mid right atrium.         I personally reviewed the above image.    ASSESSMENT/PLAN:     Active Hospital Problems    Diagnosis    *Overdose    Hypotension    Lactic acidosis    Troponin level elevated    Transaminitis    Cardiac arrest - out of hospital    MARIA DEL ROSARIO (acute kidney injury)    Rhabdomyolysis    Shock    Acute respiratory failure with hypoxia and hypercapnia    ATN (acute tubular necrosis)        Uninterrupted Critical Care/Counseling Time (not including procedures): 33 mins